# Patient Record
Sex: FEMALE | Race: WHITE | NOT HISPANIC OR LATINO | Employment: UNEMPLOYED | URBAN - METROPOLITAN AREA
[De-identification: names, ages, dates, MRNs, and addresses within clinical notes are randomized per-mention and may not be internally consistent; named-entity substitution may affect disease eponyms.]

---

## 2020-04-29 ENCOUNTER — APPOINTMENT (OUTPATIENT)
Dept: GENERAL RADIOLOGY | Facility: HOSPITAL | Age: 65
End: 2020-04-29

## 2020-04-29 ENCOUNTER — HOSPITAL ENCOUNTER (INPATIENT)
Facility: HOSPITAL | Age: 65
LOS: 13 days | Discharge: NURSING, REHAB OR LONG-TERM CARE | End: 2020-05-12
Attending: EMERGENCY MEDICINE | Admitting: HOSPITALIST

## 2020-04-29 ENCOUNTER — APPOINTMENT (OUTPATIENT)
Dept: NUCLEAR MEDICINE | Facility: HOSPITAL | Age: 65
End: 2020-04-29

## 2020-04-29 DIAGNOSIS — J96.01 ACUTE HYPOXEMIC RESPIRATORY FAILURE (HCC): Primary | ICD-10-CM

## 2020-04-29 DIAGNOSIS — U07.1 COVID-19 VIRUS INFECTION: ICD-10-CM

## 2020-04-29 DIAGNOSIS — M54.30 SCIATIC NERVE PAIN, UNSPECIFIED LATERALITY: ICD-10-CM

## 2020-04-29 PROBLEM — Z95.0 HISTORY OF PACEMAKER: Status: ACTIVE | Noted: 2020-04-29

## 2020-04-29 PROBLEM — R50.9 FEVER: Status: ACTIVE | Noted: 2020-04-29

## 2020-04-29 PROBLEM — Z88.9 MULTIPLE ALLERGIES: Status: ACTIVE | Noted: 2020-04-29

## 2020-04-29 PROBLEM — E66.9 OBESITY (BMI 30-39.9): Status: ACTIVE | Noted: 2020-04-29

## 2020-04-29 PROBLEM — J12.82 PNEUMONIA DUE TO COVID-19 VIRUS: Status: ACTIVE | Noted: 2020-04-29

## 2020-04-29 PROBLEM — Z79.01 CHRONIC ANTICOAGULATION: Status: ACTIVE | Noted: 2020-04-29

## 2020-04-29 PROBLEM — I48.0 PAROXYSMAL ATRIAL FIBRILLATION (HCC): Status: ACTIVE | Noted: 2020-04-29

## 2020-04-29 PROBLEM — J45.909 ASTHMA: Status: ACTIVE | Noted: 2020-04-29

## 2020-04-29 LAB
ALBUMIN SERPL-MCNC: 3.6 G/DL (ref 3.5–5.2)
ALBUMIN/GLOB SERPL: 0.9 G/DL
ALP SERPL-CCNC: 65 U/L (ref 39–117)
ALT SERPL W P-5'-P-CCNC: 7 U/L (ref 1–33)
ANION GAP SERPL CALCULATED.3IONS-SCNC: 14 MMOL/L (ref 5–15)
ARTERIAL PATENCY WRIST A: POSITIVE
AST SERPL-CCNC: 25 U/L (ref 1–32)
ATMOSPHERIC PRESS: ABNORMAL MM[HG]
B PARAPERT DNA SPEC QL NAA+PROBE: NOT DETECTED
B PERT DNA SPEC QL NAA+PROBE: NOT DETECTED
BASE EXCESS BLDA CALC-SCNC: 2.1 MMOL/L (ref 0–2)
BASOPHILS # BLD AUTO: 0 10*3/MM3 (ref 0–0.2)
BASOPHILS NFR BLD AUTO: 0 % (ref 0–1.5)
BDY SITE: ABNORMAL
BILIRUB SERPL-MCNC: 0.4 MG/DL (ref 0.2–1.2)
BODY TEMPERATURE: 37 C
BUN BLD-MCNC: 13 MG/DL (ref 8–23)
BUN/CREAT SERPL: 16.3 (ref 7–25)
C PNEUM DNA NPH QL NAA+NON-PROBE: NOT DETECTED
CALCIUM SPEC-SCNC: 8.8 MG/DL (ref 8.6–10.5)
CHLORIDE SERPL-SCNC: 101 MMOL/L (ref 98–107)
CO2 BLDA-SCNC: 28.1 MMOL/L (ref 22–33)
CO2 SERPL-SCNC: 25 MMOL/L (ref 22–29)
COHGB MFR BLD: 1.3 % (ref 0–2)
CREAT BLD-MCNC: 0.8 MG/DL (ref 0.57–1)
CRP SERPL-MCNC: 13.27 MG/DL (ref 0–0.5)
D DIMER PPP FEU-MCNC: 0.76 MCGFEU/ML (ref 0–0.56)
D-LACTATE SERPL-SCNC: 1.6 MMOL/L (ref 0.5–2)
DEPRECATED RDW RBC AUTO: 43.3 FL (ref 37–54)
EOSINOPHIL # BLD AUTO: 0.01 10*3/MM3 (ref 0–0.4)
EOSINOPHIL NFR BLD AUTO: 0.2 % (ref 0.3–6.2)
ERYTHROCYTE [DISTWIDTH] IN BLOOD BY AUTOMATED COUNT: 12.9 % (ref 12.3–15.4)
FLUAV H1 2009 PAND RNA NPH QL NAA+PROBE: NOT DETECTED
FLUAV H1 HA GENE NPH QL NAA+PROBE: NOT DETECTED
FLUAV H3 RNA NPH QL NAA+PROBE: NOT DETECTED
FLUAV SUBTYP SPEC NAA+PROBE: NOT DETECTED
FLUBV RNA ISLT QL NAA+PROBE: NOT DETECTED
GFR SERPL CREATININE-BSD FRML MDRD: 72 ML/MIN/1.73
GLOBULIN UR ELPH-MCNC: 3.9 GM/DL
GLUCOSE BLD-MCNC: 123 MG/DL (ref 65–99)
HADV DNA SPEC NAA+PROBE: NOT DETECTED
HCO3 BLDA-SCNC: 26.9 MMOL/L (ref 20–26)
HCOV 229E RNA SPEC QL NAA+PROBE: NOT DETECTED
HCOV HKU1 RNA SPEC QL NAA+PROBE: NOT DETECTED
HCOV NL63 RNA SPEC QL NAA+PROBE: NOT DETECTED
HCOV OC43 RNA SPEC QL NAA+PROBE: NOT DETECTED
HCT VFR BLD AUTO: 47.7 % (ref 34–46.6)
HCT VFR BLD CALC: 44.1 %
HGB BLD-MCNC: 14.9 G/DL (ref 12–15.9)
HGB BLDA-MCNC: 14.4 G/DL (ref 14–18)
HMPV RNA NPH QL NAA+NON-PROBE: NOT DETECTED
HOLD SPECIMEN: NORMAL
HOLD SPECIMEN: NORMAL
HOROWITZ INDEX BLD+IHG-RTO: 28 %
HPIV1 RNA SPEC QL NAA+PROBE: NOT DETECTED
HPIV2 RNA SPEC QL NAA+PROBE: NOT DETECTED
HPIV3 RNA NPH QL NAA+PROBE: NOT DETECTED
HPIV4 P GENE NPH QL NAA+PROBE: NOT DETECTED
IMM GRANULOCYTES # BLD AUTO: 0.01 10*3/MM3 (ref 0–0.05)
IMM GRANULOCYTES NFR BLD AUTO: 0.2 % (ref 0–0.5)
LDH SERPL-CCNC: 504 U/L (ref 135–214)
LYMPHOCYTES # BLD AUTO: 1 10*3/MM3 (ref 0.7–3.1)
LYMPHOCYTES NFR BLD AUTO: 20.1 % (ref 19.6–45.3)
M PNEUMO IGG SER IA-ACNC: NOT DETECTED
MAGNESIUM SERPL-MCNC: 1.9 MG/DL (ref 1.6–2.4)
MCH RBC QN AUTO: 28.4 PG (ref 26.6–33)
MCHC RBC AUTO-ENTMCNC: 31.2 G/DL (ref 31.5–35.7)
MCV RBC AUTO: 90.9 FL (ref 79–97)
METHGB BLD QL: 0.9 % (ref 0–1.5)
MODALITY: ABNORMAL
MONOCYTES # BLD AUTO: 0.25 10*3/MM3 (ref 0.1–0.9)
MONOCYTES NFR BLD AUTO: 5 % (ref 5–12)
NEUTROPHILS # BLD AUTO: 3.7 10*3/MM3 (ref 1.7–7)
NEUTROPHILS NFR BLD AUTO: 74.5 % (ref 42.7–76)
NOTE: ABNORMAL
NRBC BLD AUTO-RTO: 0 /100 WBC (ref 0–0.2)
NT-PROBNP SERPL-MCNC: 170.2 PG/ML (ref 5–900)
OXYHGB MFR BLDV: 92.2 % (ref 94–99)
PCO2 BLDA: 41.5 MM HG (ref 35–45)
PCO2 TEMP ADJ BLD: 41.5 MM HG (ref 35–45)
PH BLDA: 7.42 PH UNITS (ref 7.35–7.45)
PH, TEMP CORRECTED: 7.42 PH UNITS
PLATELET # BLD AUTO: 200 10*3/MM3 (ref 140–450)
PMV BLD AUTO: 10.8 FL (ref 6–12)
PO2 BLDA: 68.3 MM HG (ref 83–108)
PO2 TEMP ADJ BLD: 68.3 MM HG (ref 83–108)
POTASSIUM BLD-SCNC: 3.8 MMOL/L (ref 3.5–5.2)
PROCALCITONIN SERPL-MCNC: 0.09 NG/ML (ref 0.1–0.25)
PROT SERPL-MCNC: 7.5 G/DL (ref 6–8.5)
RBC # BLD AUTO: 5.25 10*6/MM3 (ref 3.77–5.28)
RHINOVIRUS RNA SPEC NAA+PROBE: NOT DETECTED
RSV RNA NPH QL NAA+NON-PROBE: NOT DETECTED
SODIUM BLD-SCNC: 140 MMOL/L (ref 136–145)
TROPONIN T SERPL-MCNC: <0.01 NG/ML (ref 0–0.03)
VENTILATOR MODE: ABNORMAL
WBC NRBC COR # BLD: 4.97 10*3/MM3 (ref 3.4–10.8)
WHOLE BLOOD HOLD SPECIMEN: NORMAL
WHOLE BLOOD HOLD SPECIMEN: NORMAL

## 2020-04-29 PROCEDURE — 93005 ELECTROCARDIOGRAM TRACING: CPT | Performed by: EMERGENCY MEDICINE

## 2020-04-29 PROCEDURE — 25010000002 PROCHLORPERAZINE 10 MG/2ML SOLUTION: Performed by: INTERNAL MEDICINE

## 2020-04-29 PROCEDURE — 83615 LACTATE (LD) (LDH) ENZYME: CPT | Performed by: EMERGENCY MEDICINE

## 2020-04-29 PROCEDURE — 86140 C-REACTIVE PROTEIN: CPT | Performed by: EMERGENCY MEDICINE

## 2020-04-29 PROCEDURE — 84484 ASSAY OF TROPONIN QUANT: CPT | Performed by: EMERGENCY MEDICINE

## 2020-04-29 PROCEDURE — 63710000001 DIPHENHYDRAMINE PER 50 MG: Performed by: INTERNAL MEDICINE

## 2020-04-29 PROCEDURE — 82728 ASSAY OF FERRITIN: CPT | Performed by: INTERNAL MEDICINE

## 2020-04-29 PROCEDURE — 87641 MR-STAPH DNA AMP PROBE: CPT | Performed by: INTERNAL MEDICINE

## 2020-04-29 PROCEDURE — 87635 SARS-COV-2 COVID-19 AMP PRB: CPT | Performed by: INTERNAL MEDICINE

## 2020-04-29 PROCEDURE — 87040 BLOOD CULTURE FOR BACTERIA: CPT | Performed by: EMERGENCY MEDICINE

## 2020-04-29 PROCEDURE — 80053 COMPREHEN METABOLIC PANEL: CPT | Performed by: EMERGENCY MEDICINE

## 2020-04-29 PROCEDURE — 83880 ASSAY OF NATRIURETIC PEPTIDE: CPT | Performed by: EMERGENCY MEDICINE

## 2020-04-29 PROCEDURE — 0100U HC BIOFIRE FILMARRAY RESP PANEL 2: CPT | Performed by: EMERGENCY MEDICINE

## 2020-04-29 PROCEDURE — 85379 FIBRIN DEGRADATION QUANT: CPT | Performed by: EMERGENCY MEDICINE

## 2020-04-29 PROCEDURE — 83605 ASSAY OF LACTIC ACID: CPT | Performed by: EMERGENCY MEDICINE

## 2020-04-29 PROCEDURE — 36600 WITHDRAWAL OF ARTERIAL BLOOD: CPT

## 2020-04-29 PROCEDURE — 83735 ASSAY OF MAGNESIUM: CPT | Performed by: INTERNAL MEDICINE

## 2020-04-29 PROCEDURE — 25010000002 ENOXAPARIN PER 10 MG: Performed by: INTERNAL MEDICINE

## 2020-04-29 PROCEDURE — A9540 TC99M MAA: HCPCS | Performed by: EMERGENCY MEDICINE

## 2020-04-29 PROCEDURE — 99284 EMERGENCY DEPT VISIT MOD MDM: CPT

## 2020-04-29 PROCEDURE — 99291 CRITICAL CARE FIRST HOUR: CPT | Performed by: INTERNAL MEDICINE

## 2020-04-29 PROCEDURE — 25010000002 VANCOMYCIN 10 G RECONSTITUTED SOLUTION

## 2020-04-29 PROCEDURE — 84145 PROCALCITONIN (PCT): CPT | Performed by: EMERGENCY MEDICINE

## 2020-04-29 PROCEDURE — 86900 BLOOD TYPING SEROLOGIC ABO: CPT

## 2020-04-29 PROCEDURE — 78580 LUNG PERFUSION IMAGING: CPT

## 2020-04-29 PROCEDURE — 86901 BLOOD TYPING SEROLOGIC RH(D): CPT

## 2020-04-29 PROCEDURE — 71045 X-RAY EXAM CHEST 1 VIEW: CPT

## 2020-04-29 PROCEDURE — 0 TECHNETIUM ALBUMIN AGGREGATED: Performed by: EMERGENCY MEDICINE

## 2020-04-29 PROCEDURE — 85025 COMPLETE CBC W/AUTO DIFF WBC: CPT | Performed by: EMERGENCY MEDICINE

## 2020-04-29 PROCEDURE — 82805 BLOOD GASES W/O2 SATURATION: CPT

## 2020-04-29 PROCEDURE — 94799 UNLISTED PULMONARY SVC/PX: CPT

## 2020-04-29 RX ORDER — PROCHLORPERAZINE MALEATE 5 MG/1
5 TABLET ORAL EVERY 6 HOURS PRN
Status: DISCONTINUED | OUTPATIENT
Start: 2020-04-29 | End: 2020-04-30

## 2020-04-29 RX ORDER — ALBUTEROL SULFATE 90 UG/1
2 AEROSOL, METERED RESPIRATORY (INHALATION) EVERY 4 HOURS PRN
Status: DISCONTINUED | OUTPATIENT
Start: 2020-04-29 | End: 2020-05-12 | Stop reason: HOSPADM

## 2020-04-29 RX ORDER — AZITHROMYCIN 250 MG/1
250 TABLET, FILM COATED ORAL
Status: DISCONTINUED | OUTPATIENT
Start: 2020-04-30 | End: 2020-04-30

## 2020-04-29 RX ORDER — PROCHLORPERAZINE EDISYLATE 5 MG/ML
5 INJECTION INTRAMUSCULAR; INTRAVENOUS EVERY 6 HOURS PRN
Status: DISCONTINUED | OUTPATIENT
Start: 2020-04-29 | End: 2020-04-30

## 2020-04-29 RX ORDER — DILTIAZEM HYDROCHLORIDE 240 MG/1
240 CAPSULE, COATED, EXTENDED RELEASE ORAL DAILY
Status: DISCONTINUED | OUTPATIENT
Start: 2020-04-30 | End: 2020-05-12 | Stop reason: HOSPADM

## 2020-04-29 RX ORDER — MAGNESIUM SULFATE 1 G/100ML
1 INJECTION INTRAVENOUS AS NEEDED
Status: DISCONTINUED | OUTPATIENT
Start: 2020-04-29 | End: 2020-05-12 | Stop reason: HOSPADM

## 2020-04-29 RX ORDER — ATENOLOL 25 MG/1
25 TABLET ORAL DAILY
Status: DISCONTINUED | OUTPATIENT
Start: 2020-04-30 | End: 2020-05-12 | Stop reason: HOSPADM

## 2020-04-29 RX ORDER — ATENOLOL 25 MG/1
25 TABLET ORAL DAILY
COMMUNITY

## 2020-04-29 RX ORDER — DIPHENHYDRAMINE HCL 50 MG
50 CAPSULE ORAL 2 TIMES DAILY PRN
Status: DISCONTINUED | OUTPATIENT
Start: 2020-04-29 | End: 2020-05-12 | Stop reason: HOSPADM

## 2020-04-29 RX ORDER — SODIUM CHLORIDE 0.9 % (FLUSH) 0.9 %
10 SYRINGE (ML) INJECTION AS NEEDED
Status: DISCONTINUED | OUTPATIENT
Start: 2020-04-29 | End: 2020-05-12 | Stop reason: HOSPADM

## 2020-04-29 RX ORDER — ACETAMINOPHEN 325 MG/1
650 TABLET ORAL EVERY 6 HOURS PRN
Status: DISCONTINUED | OUTPATIENT
Start: 2020-04-29 | End: 2020-05-12 | Stop reason: HOSPADM

## 2020-04-29 RX ORDER — PROCHLORPERAZINE 25 MG
25 SUPPOSITORY, RECTAL RECTAL EVERY 12 HOURS PRN
Status: DISCONTINUED | OUTPATIENT
Start: 2020-04-29 | End: 2020-04-30

## 2020-04-29 RX ORDER — DILTIAZEM HYDROCHLORIDE 240 MG/1
240 CAPSULE, COATED, EXTENDED RELEASE ORAL DAILY
COMMUNITY

## 2020-04-29 RX ORDER — MAGNESIUM SULFATE HEPTAHYDRATE 40 MG/ML
2 INJECTION, SOLUTION INTRAVENOUS AS NEEDED
Status: DISCONTINUED | OUTPATIENT
Start: 2020-04-29 | End: 2020-05-12 | Stop reason: HOSPADM

## 2020-04-29 RX ORDER — SPIRONOLACTONE 25 MG/1
25 TABLET ORAL DAILY PRN
COMMUNITY

## 2020-04-29 RX ORDER — MAGNESIUM SULFATE HEPTAHYDRATE 40 MG/ML
4 INJECTION, SOLUTION INTRAVENOUS AS NEEDED
Status: DISCONTINUED | OUTPATIENT
Start: 2020-04-29 | End: 2020-05-12 | Stop reason: HOSPADM

## 2020-04-29 RX ORDER — SPIRONOLACTONE 25 MG/1
50 TABLET ORAL DAILY
Status: DISCONTINUED | OUTPATIENT
Start: 2020-04-30 | End: 2020-04-30

## 2020-04-29 RX ORDER — SODIUM CHLORIDE 0.9 % (FLUSH) 0.9 %
10 SYRINGE (ML) INJECTION EVERY 12 HOURS SCHEDULED
Status: DISCONTINUED | OUTPATIENT
Start: 2020-04-29 | End: 2020-05-12 | Stop reason: HOSPADM

## 2020-04-29 RX ADMIN — Medication 1 DOSE: at 17:50

## 2020-04-29 RX ADMIN — VANCOMYCIN HYDROCHLORIDE 2250 MG: 10 INJECTION, POWDER, LYOPHILIZED, FOR SOLUTION INTRAVENOUS at 21:52

## 2020-04-29 RX ADMIN — PROCHLORPERAZINE EDISYLATE 5 MG: 5 INJECTION INTRAMUSCULAR; INTRAVENOUS at 22:51

## 2020-04-29 RX ADMIN — ENOXAPARIN SODIUM 120 MG: 120 INJECTION SUBCUTANEOUS at 21:52

## 2020-04-29 RX ADMIN — SODIUM CHLORIDE, PRESERVATIVE FREE 10 ML: 5 INJECTION INTRAVENOUS at 21:53

## 2020-04-29 RX ADMIN — DIPHENHYDRAMINE HYDROCHLORIDE 50 MG: 50 CAPSULE ORAL at 21:01

## 2020-04-29 RX ADMIN — ALBUTEROL SULFATE 2 PUFF: 108 AEROSOL, METERED RESPIRATORY (INHALATION) at 23:20

## 2020-04-29 RX ADMIN — ACETAMINOPHEN 650 MG: 325 TABLET, FILM COATED ORAL at 21:52

## 2020-04-30 ENCOUNTER — APPOINTMENT (OUTPATIENT)
Dept: CT IMAGING | Facility: HOSPITAL | Age: 65
End: 2020-04-30

## 2020-04-30 LAB
ABO GROUP BLD: NORMAL
ABO GROUP BLD: NORMAL
ANION GAP SERPL CALCULATED.3IONS-SCNC: 13 MMOL/L (ref 5–15)
BASOPHILS # BLD AUTO: 0.01 10*3/MM3 (ref 0–0.2)
BASOPHILS NFR BLD AUTO: 0.2 % (ref 0–1.5)
BLD GP AB SCN SERPL QL: NEGATIVE
BUN BLD-MCNC: 14 MG/DL (ref 8–23)
BUN/CREAT SERPL: 19.2 (ref 7–25)
CALCIUM SPEC-SCNC: 8.2 MG/DL (ref 8.6–10.5)
CHLORIDE SERPL-SCNC: 100 MMOL/L (ref 98–107)
CO2 SERPL-SCNC: 22 MMOL/L (ref 22–29)
CREAT BLD-MCNC: 0.73 MG/DL (ref 0.57–1)
CRP SERPL-MCNC: 13.79 MG/DL (ref 0–0.5)
D DIMER PPP FEU-MCNC: 0.95 MCGFEU/ML (ref 0–0.56)
DEPRECATED RDW RBC AUTO: 43.3 FL (ref 37–54)
EOSINOPHIL # BLD AUTO: 0.01 10*3/MM3 (ref 0–0.4)
EOSINOPHIL NFR BLD AUTO: 0.2 % (ref 0.3–6.2)
ERYTHROCYTE [DISTWIDTH] IN BLOOD BY AUTOMATED COUNT: 12.9 % (ref 12.3–15.4)
FERRITIN SERPL-MCNC: 624.4 NG/ML (ref 13–150)
FERRITIN SERPL-MCNC: 666.5 NG/ML (ref 13–150)
FIBRINOGEN PPP-MCNC: 647 MG/DL (ref 215–430)
GFR SERPL CREATININE-BSD FRML MDRD: 80 ML/MIN/1.73
GLUCOSE BLD-MCNC: 144 MG/DL (ref 65–99)
HAV IGM SERPL QL IA: NORMAL
HBV CORE IGM SERPL QL IA: NORMAL
HBV SURFACE AG SERPL QL IA: NORMAL
HCT VFR BLD AUTO: 43.1 % (ref 34–46.6)
HCV AB SER DONR QL: NORMAL
HGB BLD-MCNC: 13.7 G/DL (ref 12–15.9)
HIV1+2 AB SER QL: NORMAL
HOLD SPECIMEN: NORMAL
IMM GRANULOCYTES # BLD AUTO: 0.01 10*3/MM3 (ref 0–0.05)
IMM GRANULOCYTES NFR BLD AUTO: 0.2 % (ref 0–0.5)
L PNEUMO1 AG UR QL IA: NEGATIVE
LDH SERPL-CCNC: 512 U/L (ref 135–214)
LYMPHOCYTES # BLD AUTO: 1.2 10*3/MM3 (ref 0.7–3.1)
LYMPHOCYTES NFR BLD AUTO: 25.5 % (ref 19.6–45.3)
MAGNESIUM SERPL-MCNC: 1.8 MG/DL (ref 1.6–2.4)
MCH RBC QN AUTO: 29.1 PG (ref 26.6–33)
MCHC RBC AUTO-ENTMCNC: 31.8 G/DL (ref 31.5–35.7)
MCV RBC AUTO: 91.7 FL (ref 79–97)
MONOCYTES # BLD AUTO: 0.22 10*3/MM3 (ref 0.1–0.9)
MONOCYTES NFR BLD AUTO: 4.7 % (ref 5–12)
MRSA DNA SPEC QL NAA+PROBE: NEGATIVE
NEUTROPHILS # BLD AUTO: 3.26 10*3/MM3 (ref 1.7–7)
NEUTROPHILS NFR BLD AUTO: 69.2 % (ref 42.7–76)
NRBC BLD AUTO-RTO: 0 /100 WBC (ref 0–0.2)
PLATELET # BLD AUTO: 196 10*3/MM3 (ref 140–450)
PMV BLD AUTO: 11.3 FL (ref 6–12)
POTASSIUM BLD-SCNC: 3.4 MMOL/L (ref 3.5–5.2)
POTASSIUM BLD-SCNC: 3.7 MMOL/L (ref 3.5–5.2)
RBC # BLD AUTO: 4.7 10*6/MM3 (ref 3.77–5.28)
RH BLD: POSITIVE
RH BLD: POSITIVE
S PNEUM AG SPEC QL LA: NEGATIVE
SARS-COV-2 RNA RESP QL NAA+PROBE: DETECTED
SODIUM BLD-SCNC: 135 MMOL/L (ref 136–145)
T&S EXPIRATION DATE: NORMAL
WBC NRBC COR # BLD: 4.71 10*3/MM3 (ref 3.4–10.8)

## 2020-04-30 PROCEDURE — 25010000002 TOCILIZUMAB 400 MG/20ML SOLUTION 20 ML VIAL: Performed by: INTERNAL MEDICINE

## 2020-04-30 PROCEDURE — 36430 TRANSFUSION BLD/BLD COMPNT: CPT

## 2020-04-30 PROCEDURE — 86140 C-REACTIVE PROTEIN: CPT | Performed by: INTERNAL MEDICINE

## 2020-04-30 PROCEDURE — 80048 BASIC METABOLIC PNL TOTAL CA: CPT | Performed by: INTERNAL MEDICINE

## 2020-04-30 PROCEDURE — 86900 BLOOD TYPING SEROLOGIC ABO: CPT | Performed by: INTERNAL MEDICINE

## 2020-04-30 PROCEDURE — G0432 EIA HIV-1/HIV-2 SCREEN: HCPCS | Performed by: INTERNAL MEDICINE

## 2020-04-30 PROCEDURE — 63710000001 DIPHENHYDRAMINE PER 50 MG: Performed by: INTERNAL MEDICINE

## 2020-04-30 PROCEDURE — 94640 AIRWAY INHALATION TREATMENT: CPT

## 2020-04-30 PROCEDURE — 99291 CRITICAL CARE FIRST HOUR: CPT | Performed by: INTERNAL MEDICINE

## 2020-04-30 PROCEDURE — 85379 FIBRIN DEGRADATION QUANT: CPT | Performed by: INTERNAL MEDICINE

## 2020-04-30 PROCEDURE — 99292 CRITICAL CARE ADDL 30 MIN: CPT | Performed by: INTERNAL MEDICINE

## 2020-04-30 PROCEDURE — 83735 ASSAY OF MAGNESIUM: CPT | Performed by: INTERNAL MEDICINE

## 2020-04-30 PROCEDURE — 25010000002 VANCOMYCIN 10 G RECONSTITUTED SOLUTION: Performed by: INTERNAL MEDICINE

## 2020-04-30 PROCEDURE — 82728 ASSAY OF FERRITIN: CPT | Performed by: INTERNAL MEDICINE

## 2020-04-30 PROCEDURE — 71250 CT THORAX DX C-: CPT

## 2020-04-30 PROCEDURE — 80074 ACUTE HEPATITIS PANEL: CPT | Performed by: INTERNAL MEDICINE

## 2020-04-30 PROCEDURE — 85384 FIBRINOGEN ACTIVITY: CPT | Performed by: INTERNAL MEDICINE

## 2020-04-30 PROCEDURE — P9017 PLASMA 1 DONOR FRZ W/IN 8 HR: HCPCS

## 2020-04-30 PROCEDURE — 84132 ASSAY OF SERUM POTASSIUM: CPT | Performed by: INTERNAL MEDICINE

## 2020-04-30 PROCEDURE — 83520 IMMUNOASSAY QUANT NOS NONAB: CPT | Performed by: INTERNAL MEDICINE

## 2020-04-30 PROCEDURE — 94799 UNLISTED PULMONARY SVC/PX: CPT

## 2020-04-30 PROCEDURE — 86850 RBC ANTIBODY SCREEN: CPT | Performed by: INTERNAL MEDICINE

## 2020-04-30 PROCEDURE — 25010000002 FUROSEMIDE PER 20 MG: Performed by: INTERNAL MEDICINE

## 2020-04-30 PROCEDURE — 87899 AGENT NOS ASSAY W/OPTIC: CPT | Performed by: INTERNAL MEDICINE

## 2020-04-30 PROCEDURE — 83615 LACTATE (LD) (LDH) ENZYME: CPT | Performed by: INTERNAL MEDICINE

## 2020-04-30 PROCEDURE — 86901 BLOOD TYPING SEROLOGIC RH(D): CPT | Performed by: INTERNAL MEDICINE

## 2020-04-30 PROCEDURE — 25010000002 MAGNESIUM SULFATE 2 GM/50ML SOLUTION: Performed by: INTERNAL MEDICINE

## 2020-04-30 PROCEDURE — 25010000002 ENOXAPARIN PER 10 MG: Performed by: INTERNAL MEDICINE

## 2020-04-30 PROCEDURE — 86927 PLASMA FRESH FROZEN: CPT

## 2020-04-30 PROCEDURE — 85025 COMPLETE CBC W/AUTO DIFF WBC: CPT | Performed by: INTERNAL MEDICINE

## 2020-04-30 RX ORDER — POTASSIUM CHLORIDE 750 MG/1
40 CAPSULE, EXTENDED RELEASE ORAL AS NEEDED
Status: DISCONTINUED | OUTPATIENT
Start: 2020-04-30 | End: 2020-05-12 | Stop reason: HOSPADM

## 2020-04-30 RX ORDER — BUMETANIDE 0.25 MG/ML
2 INJECTION INTRAMUSCULAR; INTRAVENOUS ONCE
Status: COMPLETED | OUTPATIENT
Start: 2020-04-30 | End: 2020-04-30

## 2020-04-30 RX ORDER — BUMETANIDE 0.25 MG/ML
2.5 INJECTION INTRAMUSCULAR; INTRAVENOUS ONCE
Status: COMPLETED | OUTPATIENT
Start: 2020-04-30 | End: 2020-04-30

## 2020-04-30 RX ORDER — FUROSEMIDE 10 MG/ML
60 INJECTION INTRAMUSCULAR; INTRAVENOUS EVERY 12 HOURS
Status: COMPLETED | OUTPATIENT
Start: 2020-04-30 | End: 2020-05-02

## 2020-04-30 RX ORDER — PRAVASTATIN SODIUM 40 MG
40 TABLET ORAL NIGHTLY
Status: DISCONTINUED | OUTPATIENT
Start: 2020-04-30 | End: 2020-05-12 | Stop reason: HOSPADM

## 2020-04-30 RX ADMIN — ACETAMINOPHEN 650 MG: 325 TABLET, FILM COATED ORAL at 14:49

## 2020-04-30 RX ADMIN — SODIUM CHLORIDE, PRESERVATIVE FREE 10 ML: 5 INJECTION INTRAVENOUS at 21:49

## 2020-04-30 RX ADMIN — ENOXAPARIN SODIUM 120 MG: 120 INJECTION SUBCUTANEOUS at 09:11

## 2020-04-30 RX ADMIN — DILTIAZEM HYDROCHLORIDE 240 MG: 240 CAPSULE, COATED, EXTENDED RELEASE ORAL at 09:11

## 2020-04-30 RX ADMIN — VANCOMYCIN HYDROCHLORIDE 1250 MG: 10 INJECTION, POWDER, LYOPHILIZED, FOR SOLUTION INTRAVENOUS at 09:12

## 2020-04-30 RX ADMIN — ALBUTEROL SULFATE 2 PUFF: 108 AEROSOL, METERED RESPIRATORY (INHALATION) at 16:12

## 2020-04-30 RX ADMIN — DIPHENHYDRAMINE HYDROCHLORIDE 50 MG: 50 CAPSULE ORAL at 21:00

## 2020-04-30 RX ADMIN — ACETAMINOPHEN 650 MG: 325 TABLET, FILM COATED ORAL at 03:12

## 2020-04-30 RX ADMIN — ALBUTEROL SULFATE 157.65 PUFF: 108 AEROSOL, METERED RESPIRATORY (INHALATION) at 12:55

## 2020-04-30 RX ADMIN — SPIRONOLACTONE 50 MG: 25 TABLET ORAL at 09:11

## 2020-04-30 RX ADMIN — AZTREONAM 1 G: 1 INJECTION, POWDER, LYOPHILIZED, FOR SOLUTION INTRAMUSCULAR; INTRAVENOUS at 01:30

## 2020-04-30 RX ADMIN — POTASSIUM CHLORIDE 40 MEQ: 10 CAPSULE, COATED, EXTENDED RELEASE ORAL at 06:01

## 2020-04-30 RX ADMIN — BUMETANIDE 2.5 MG: 0.25 INJECTION, SOLUTION INTRAMUSCULAR; INTRAVENOUS at 21:48

## 2020-04-30 RX ADMIN — BUMETANIDE 2 MG: 0.25 INJECTION, SOLUTION INTRAMUSCULAR; INTRAVENOUS at 03:16

## 2020-04-30 RX ADMIN — AZITHROMYCIN 250 MG: 250 TABLET, FILM COATED ORAL at 09:11

## 2020-04-30 RX ADMIN — SODIUM CHLORIDE, PRESERVATIVE FREE 10 ML: 5 INJECTION INTRAVENOUS at 09:13

## 2020-04-30 RX ADMIN — FUROSEMIDE 60 MG: 10 INJECTION, SOLUTION INTRAMUSCULAR; INTRAVENOUS at 12:21

## 2020-04-30 RX ADMIN — AZTREONAM 1 G: 1 INJECTION, POWDER, LYOPHILIZED, FOR SOLUTION INTRAMUSCULAR; INTRAVENOUS at 09:28

## 2020-04-30 RX ADMIN — POTASSIUM CHLORIDE 40 MEQ: 10 CAPSULE, COATED, EXTENDED RELEASE ORAL at 12:21

## 2020-04-30 RX ADMIN — TOCILIZUMAB 800 MG: 20 INJECTION, SOLUTION, CONCENTRATE INTRAVENOUS at 02:49

## 2020-04-30 RX ADMIN — PRAVASTATIN SODIUM 40 MG: 40 TABLET ORAL at 21:49

## 2020-04-30 RX ADMIN — ENOXAPARIN SODIUM 120 MG: 120 INJECTION SUBCUTANEOUS at 20:04

## 2020-04-30 RX ADMIN — ATENOLOL 25 MG: 25 TABLET ORAL at 09:11

## 2020-04-30 RX ADMIN — MAGNESIUM SULFATE 2 G: 2 INJECTION INTRAVENOUS at 06:01

## 2020-05-01 ENCOUNTER — APPOINTMENT (OUTPATIENT)
Dept: GENERAL RADIOLOGY | Facility: HOSPITAL | Age: 65
End: 2020-05-01

## 2020-05-01 LAB
ALBUMIN SERPL-MCNC: 3.4 G/DL (ref 3.5–5.2)
ALBUMIN/GLOB SERPL: 1.1 G/DL
ALP SERPL-CCNC: 62 U/L (ref 39–117)
ALT SERPL W P-5'-P-CCNC: 11 U/L (ref 1–33)
ANION GAP SERPL CALCULATED.3IONS-SCNC: 15 MMOL/L (ref 5–15)
AST SERPL-CCNC: 34 U/L (ref 1–32)
BH BB BLOOD EXPIRATION DATE: NORMAL
BH BB BLOOD EXPIRATION DATE: NORMAL
BH BB BLOOD TYPE BARCODE: 6200
BH BB BLOOD TYPE BARCODE: 6200
BH BB DISPENSE STATUS: NORMAL
BH BB DISPENSE STATUS: NORMAL
BH BB PRODUCT CODE: NORMAL
BH BB PRODUCT CODE: NORMAL
BH BB UNIT NUMBER: NORMAL
BH BB UNIT NUMBER: NORMAL
BILIRUB SERPL-MCNC: 0.3 MG/DL (ref 0.2–1.2)
BUN BLD-MCNC: 16 MG/DL (ref 8–23)
BUN/CREAT SERPL: 19.5 (ref 7–25)
CA-I SERPL ISE-MCNC: 1.12 MMOL/L (ref 1.12–1.32)
CALCIUM SPEC-SCNC: 8.3 MG/DL (ref 8.6–10.5)
CHLORIDE SERPL-SCNC: 97 MMOL/L (ref 98–107)
CO2 SERPL-SCNC: 27 MMOL/L (ref 22–29)
CREAT BLD-MCNC: 0.82 MG/DL (ref 0.57–1)
DEPRECATED RDW RBC AUTO: 43.7 FL (ref 37–54)
ERYTHROCYTE [DISTWIDTH] IN BLOOD BY AUTOMATED COUNT: 13 % (ref 12.3–15.4)
GFR SERPL CREATININE-BSD FRML MDRD: 70 ML/MIN/1.73
GLOBULIN UR ELPH-MCNC: 3.1 GM/DL
GLUCOSE BLD-MCNC: 108 MG/DL (ref 65–99)
HCT VFR BLD AUTO: 44.9 % (ref 34–46.6)
HGB BLD-MCNC: 13.9 G/DL (ref 12–15.9)
IL6 SERPL-MCNC: 132.6 PG/ML (ref 0–15.5)
MAGNESIUM SERPL-MCNC: 1.9 MG/DL (ref 1.6–2.4)
MCH RBC QN AUTO: 28.2 PG (ref 26.6–33)
MCHC RBC AUTO-ENTMCNC: 31 G/DL (ref 31.5–35.7)
MCV RBC AUTO: 91.1 FL (ref 79–97)
PHOSPHATE SERPL-MCNC: 2.7 MG/DL (ref 2.5–4.5)
PLATELET # BLD AUTO: 227 10*3/MM3 (ref 140–450)
PMV BLD AUTO: 11.1 FL (ref 6–12)
POTASSIUM BLD-SCNC: 4.2 MMOL/L (ref 3.5–5.2)
PROT SERPL-MCNC: 6.5 G/DL (ref 6–8.5)
RBC # BLD AUTO: 4.93 10*6/MM3 (ref 3.77–5.28)
SODIUM BLD-SCNC: 139 MMOL/L (ref 136–145)
UNIT  ABO: NORMAL
UNIT  ABO: NORMAL
UNIT  RH: NORMAL
UNIT  RH: NORMAL
WBC NRBC COR # BLD: 3.8 10*3/MM3 (ref 3.4–10.8)

## 2020-05-01 PROCEDURE — 84100 ASSAY OF PHOSPHORUS: CPT | Performed by: INTERNAL MEDICINE

## 2020-05-01 PROCEDURE — 82330 ASSAY OF CALCIUM: CPT | Performed by: INTERNAL MEDICINE

## 2020-05-01 PROCEDURE — 25010000002 ENOXAPARIN PER 10 MG: Performed by: INTERNAL MEDICINE

## 2020-05-01 PROCEDURE — 83735 ASSAY OF MAGNESIUM: CPT | Performed by: INTERNAL MEDICINE

## 2020-05-01 PROCEDURE — 71045 X-RAY EXAM CHEST 1 VIEW: CPT

## 2020-05-01 PROCEDURE — 25010000002 FUROSEMIDE PER 20 MG: Performed by: INTERNAL MEDICINE

## 2020-05-01 PROCEDURE — 99233 SBSQ HOSP IP/OBS HIGH 50: CPT | Performed by: INTERNAL MEDICINE

## 2020-05-01 PROCEDURE — 25010000002 DEXAMETHASONE PER 1 MG: Performed by: INTERNAL MEDICINE

## 2020-05-01 PROCEDURE — 63710000001 DIPHENHYDRAMINE PER 50 MG: Performed by: INTERNAL MEDICINE

## 2020-05-01 PROCEDURE — 85027 COMPLETE CBC AUTOMATED: CPT | Performed by: INTERNAL MEDICINE

## 2020-05-01 PROCEDURE — 25010000002 MAGNESIUM SULFATE 2 GM/50ML SOLUTION: Performed by: INTERNAL MEDICINE

## 2020-05-01 PROCEDURE — 80053 COMPREHEN METABOLIC PANEL: CPT | Performed by: INTERNAL MEDICINE

## 2020-05-01 RX ORDER — DEXAMETHASONE IN 0.9 % SOD CHL 10 MG/50ML
10 INTRAVENOUS SOLUTION, PIGGYBACK (ML) INTRAVENOUS ONCE
Status: COMPLETED | OUTPATIENT
Start: 2020-05-01 | End: 2020-05-01

## 2020-05-01 RX ADMIN — SODIUM CHLORIDE, PRESERVATIVE FREE 10 ML: 5 INJECTION INTRAVENOUS at 08:46

## 2020-05-01 RX ADMIN — SODIUM CHLORIDE, PRESERVATIVE FREE 10 ML: 5 INJECTION INTRAVENOUS at 22:22

## 2020-05-01 RX ADMIN — DEXAMETHASONE SODIUM PHOSPHATE 10 MG: 10 INJECTION INTRAMUSCULAR; INTRAVENOUS at 22:22

## 2020-05-01 RX ADMIN — DILTIAZEM HYDROCHLORIDE 240 MG: 240 CAPSULE, COATED, EXTENDED RELEASE ORAL at 08:45

## 2020-05-01 RX ADMIN — SODIUM CHLORIDE, PRESERVATIVE FREE 10 ML: 5 INJECTION INTRAVENOUS at 00:01

## 2020-05-01 RX ADMIN — PRAVASTATIN SODIUM 40 MG: 40 TABLET ORAL at 20:19

## 2020-05-01 RX ADMIN — ENOXAPARIN SODIUM 120 MG: 120 INJECTION SUBCUTANEOUS at 08:45

## 2020-05-01 RX ADMIN — FUROSEMIDE 60 MG: 10 INJECTION, SOLUTION INTRAMUSCULAR; INTRAVENOUS at 00:00

## 2020-05-01 RX ADMIN — MAGNESIUM SULFATE 2 G: 2 INJECTION INTRAVENOUS at 06:23

## 2020-05-01 RX ADMIN — ATENOLOL 25 MG: 25 TABLET ORAL at 08:45

## 2020-05-01 RX ADMIN — ENOXAPARIN SODIUM 120 MG: 120 INJECTION SUBCUTANEOUS at 20:19

## 2020-05-01 RX ADMIN — ACETAMINOPHEN 650 MG: 325 TABLET, FILM COATED ORAL at 22:17

## 2020-05-01 RX ADMIN — ACETAMINOPHEN 650 MG: 325 TABLET, FILM COATED ORAL at 03:33

## 2020-05-01 RX ADMIN — FUROSEMIDE 60 MG: 10 INJECTION, SOLUTION INTRAMUSCULAR; INTRAVENOUS at 12:17

## 2020-05-01 RX ADMIN — DIPHENHYDRAMINE HYDROCHLORIDE 50 MG: 50 CAPSULE ORAL at 22:19

## 2020-05-01 NOTE — PROGRESS NOTES
Clinical Nutrition     Multidisciplinary Rounds      Patient Name: Marlene Aguirre  Date of Encounter: 05/01/20 12:22  MRN: [id number]  Admission date: 4/29/2020      Reason for visit: GRACE. RD to continue to follow per protocol.     Pt sitting up in chair, on nasal cannula, in no distress    Spoke with pt via phone as in isolation precautions    Current diet: Diet Regular; Cardiac    Intervention:  Follow treatment plan  Care plan reviewed  Menu provided, adjusted    Follow up:   Per protocol      Loly Jane RD  12:22 PM  Time: 15min

## 2020-05-01 NOTE — PLAN OF CARE
Pt remained on 6 LNC thru out night, spo2 from 85-92%. Bumex and lasix given IV  per md order urinary output 1900 ml. Replaced magnesium iv. Pt rested well during night.

## 2020-05-01 NOTE — PROGRESS NOTES
INFECTIOUS DISEASE CONSULT/INITIAL HOSPITAL VISIT    Marlene Aguirre  1955  [id number]    Date of Consult: 5/1/2020    Admission Date: 4/29/2020      Requesting Provider: Rishi Gill MD  Evaluating Physician: Braxton Horn MD    Reason for Consultation: COVID-19    History of present illness:    Patient is a 64 y.o. female who has intermittent asthma who has history of tobacco use and quit 3 years ago has multiple family members living with her.  Apparently patient developed headache, diarrhea, dry cough was given a Z-Jules by primary care physician on April 27,020 patient had progressive cough and fever and hypoxia on April 29.    Apparently a therapist that was coming to the house tested positive for COVID prior to patient's symptoms.    Patient admitted to the floor but because of worsening hypoxia and fevers was transferred to the intensive care unit.  Patient given 1 dose of 800 mg of Actemra last night    Patient is stable hemodynamically and on oxygen by nasal cannula currently her sats on 6 L are 91%    CT scan performed high probability for COVID-19 with bilateral groundglass opacities    COVID PCR positive    5/1/2020 patient received 2 units of convalescent serum plasma yesterday is received IV Lasix as well remains on 6 L with O2 sats between 88% and 90%.  Patient is a good spirits is sitting in chair is able to order food for nutrition per telephone call.  Per nursing staff had liquid bowel movements x3 yesterday is otherwise afebrile and hemodynamically stable    Review of systems remark for diarrhea no fevers  Past Medical History:   Diagnosis Date   • Asthma    • Atrial fibrillation (CMS/HCC)    • Coronary artery disease    • Hypertension        Past Surgical History:   Procedure Laterality Date   • BILATERAL BREAST REDUCTION     • BRAIN SURGERY     • DILATATION AND CURETTAGE     • PACEMAKER IMPLANTATION         History reviewed. No pertinent family history.    Social History      Socioeconomic History   • Marital status:      Spouse name: Not on file   • Number of children: Not on file   • Years of education: Not on file   • Highest education level: Not on file   Tobacco Use   • Smoking status: Former Smoker   • Smokeless tobacco: Never Used   • Tobacco comment: QUIT 3 YEARS AGO   Substance and Sexual Activity   • Alcohol use: Never     Frequency: Never   • Drug use: Never   • Sexual activity: Defer       Allergies   Allergen Reactions   • Contrast Dye Swelling     FACE, NOSE AND NECK SWELLING AS WELL AS SANDI    • Doxycycline Provider Review Needed     PT UNSURE OF REACTION    • Flexeril [Cyclobenzaprine] Hives   • Keflex [Cephalexin] Provider Review Needed     PT DOES NOT REMEMBER    • Kenalog [Triamcinolone Acetonide] Itching   • Penicillins Hives   • Sulfa Antibiotics Itching   • Tetracyclines & Related Provider Review Needed     PT UNSURE OF REACTION   • Levaquin [Levofloxacin] Rash         Medication:    Current Facility-Administered Medications:   •  acetaminophen (TYLENOL) tablet 650 mg, 650 mg, Oral, Q6H PRN, Kan Ng MD, 650 mg at 05/01/20 0333  •  albuterol sulfate HFA (PROVENTIL HFA;VENTOLIN HFA;PROAIR HFA) inhaler 2 puff, 2 puff, Inhalation, Q4H PRN, Kan Ng MD, 2 puff at 04/30/20 1612  •  atenolol (TENORMIN) tablet 25 mg, 25 mg, Oral, Daily, Kan Ng MD, 25 mg at 05/01/20 0845  •  dilTIAZem CD (CARDIZEM CD) 24 hr capsule 240 mg, 240 mg, Oral, Daily, Kan Ng MD, 240 mg at 05/01/20 0845  •  diphenhydrAMINE (BENADRYL) capsule 50 mg, 50 mg, Oral, BID PRN, Kan Ng MD, 50 mg at 04/30/20 2100  •  enoxaparin (LOVENOX) syringe 120 mg, 1 mg/kg, Subcutaneous, Q12H, Kan Ng MD, 120 mg at 05/01/20 0845  •  furosemide (LASIX) injection 60 mg, 60 mg, Intravenous, Q12H, Efrem Laguerre MD, 60 mg at 05/01/20 1217  •  magnesium sulfate 4 gram infusion - Mg less than or equal to 1mg/dL, 4 g, Intravenous, PRN **OR** magnesium sulfate 3  gram infusion (1gm x 3) - Mg 1.1 - 1.5 mg/dL, 1 g, Intravenous, PRN **OR** Magnesium Sulfate 2 gram infusion- Mg 1.6 - 1.9 mg/dL, 2 g, Intravenous, PRN, Kan Ng MD, Last Rate: 25 mL/hr at 20 0623, 2 g at 20 0623  •  potassium chloride (MICRO-K) CR capsule 40 mEq, 40 mEq, Oral, PRN, Kan Ng MD, 40 mEq at 20 1221  •  pravastatin (PRAVACHOL) tablet 40 mg, 40 mg, Oral, Nightly, Braxton Horn MD, 40 mg at 20 2149  •  sodium chloride 0.9 % flush 10 mL, 10 mL, Intravenous, PRNHernandez Yuri, MD  •  sodium chloride 0.9 % flush 10 mL, 10 mL, Intravenous, Q12H, Kan Ng MD, 10 mL at 20 0846  •  sodium chloride 0.9 % flush 10 mL, 10 mL, Intravenous, PRN, Kan Ng MD, 10 mL at 20 0001    Antibiotics:  Anti-Infectives (From admission, onward)    Ordered     Dose/Rate Route Frequency Start Stop    20  vancomycin 2250 mg/500 mL 0.9% NS IVPB ([facility])     Ordering Provider:  Sondra Worrell RPH    20 mg/kg × 118 kg  over 150 Minutes Intravenous Once 20 0022    20  aztreonam (AZACTAM) 1 g in 50 mL NS IVPB     Ordering Provider:  Sondra Worrell RPH    1 g  over 30 Minutes Intravenous Once 20 0200            Review of Systems:  See HPI      Physical Exam:   Vital Signs  Temp (24hrs), Av.7 °F (37.1 °C), Min:98.3 °F (36.8 °C), Max:99 °F (37.2 °C)    Temp  Min: 98.3 °F (36.8 °C)  Max: 99 °F (37.2 °C)  BP  Min: 92/52  Max: 127/67  Pulse  Min: 65  Max: 84  Resp  Min: 16  Max: 22  SpO2  Min: 85 %  Max: 94 %  Patient seen through window and list to conversation with nutritionist.  Patient examined by Bluetooth stethoscope with assisting nursing staff was already in the room and PPE  GENERAL: Awake and alert, pleasant in no distress on oxygen by nasal cannula  HEENT: Normocephalic, atraumatic.  EOMI no external oral lesions    HEART: S1-S2 no murmur by Bluetooth stethoscope  LUNGS: Bilateral chest wall  expansion clear breath sounds bilaterally by Bluetooth stethoscope  ABDOMEN: Bowel sounds were present.  No obvious abdominal distention on exam    MSK: Able to hold a phone with her right hand    NEURO: Oriented to PPT.  Able to have an appropriate conversation  PSYCHIATRIC:  Normal affect    Laboratory Data    Results from last 7 days   Lab Units 05/01/20  0343 04/30/20  0309 04/29/20  1511   WBC 10*3/mm3 3.80 4.71 4.97   HEMOGLOBIN g/dL 13.9 13.7 14.9   HEMATOCRIT % 44.9 43.1 47.7*   PLATELETS 10*3/mm3 227 196 200     Results from last 7 days   Lab Units 05/01/20  0343   SODIUM mmol/L 139   POTASSIUM mmol/L 4.2   CHLORIDE mmol/L 97*   CO2 mmol/L 27.0   BUN mg/dL 16   CREATININE mg/dL 0.82   GLUCOSE mg/dL 108*   CALCIUM mg/dL 8.3*     Results from last 7 days   Lab Units 05/01/20  0343   ALK PHOS U/L 62   BILIRUBIN mg/dL 0.3   ALT (SGPT) U/L 11   AST (SGOT) U/L 34*         Results from last 7 days   Lab Units 04/30/20  0309   CRP mg/dL 13.79*     Results from last 7 days   Lab Units 04/29/20  1511   LACTATE mmol/L 1.6             Estimated Creatinine Clearance: 83.4 mL/min (by C-G formula based on SCr of 0.82 mg/dL).      Microbiology:  No results found for: ACANTHNAEG, AFBCX, BPERTUSSISCX, BLOODCX  No results found for: BCIDPCR, CXREFLEX, CSFCX, CULTURETIS  No results found for: CULTURES, HSVCX, URCX  No results found for: EYECULTURE, GCCX, LABHSV  No results found for: LEGIONELLA, MRSACX, MUMPSCX, MYCOPLASCX  No results found for: NOCARDIACX, STOOLCX  No results found for: THROATCX, UNSTIMCULT, URINECX, CULTURE, VZVCULTUR  No results found for: VIRALCULTU, WOUNDCX        Radiology:  Imaging Results (Last 72 Hours)     Procedure Component Value Units Date/Time    XR Chest 1 View [[id number]] Collected:  05/01/20 0842     Updated:  05/01/20 0846    Narrative:       EXAMINATION: XR CHEST 1 VW-     INDICATION: Resp Distress; J96.01-Acute respiratory failure with  hypoxia; U07.1-COVID-19     COMPARISON: 04/29/2020      FINDINGS: Patchy disease in the right upper lung has moderately  increased. There is mild increased interstitial change in the left  perihilar region and right base. No dense lung consolidation effusion or  pneumothorax is seen. The heart is mildly enlarged. Vasculature is  slightly cephalized.          Impression:       Increased patchy bilateral interstitial disease, greater in  the right upper lung than elsewhere.           NM Lung Scan Perfusion Particulate [[id number]] Collected:  04/29/20 1825     Updated:  04/30/20 1642    Narrative:       EXAMINATION: NM LUNG SCAN, PERFUSION PARTICULATE-04/29/2020:     INDICATION: Hypoxemia in COVID patient with IV dye allergy; J96.01-Acute  respiratory failure with hypoxia; U07.1-COVID-19.      TECHNIQUE: Nuclear medicine perfusion scan performed with 5.5 mCi of MAA  administered.     COMPARISON: NONE.     FINDINGS: Heterogeneity of perfusion correlating with acute parenchymal  disease process on chest x-ray performed same day, however, no large  defect to suggest perfusional abnormality.       Impression:       Heterogeneity of chronic findings along with acute airspace  disease as seen on chest x-ray performed same day without perfusional  defect otherwise identified, overall low probability for perfusional  abnormality. If symptoms persistent and/or progress, CT angiogram of the  chest may be considered for further evaluation.     D:  04/29/2020  E:  04/30/2020            This report was finalized on 4/30/2020 4:38 PM by Dr. Homer Zhu.       XR Chest 1 View [[id number]] Collected:  04/29/20 1732     Updated:  04/30/20 1641    Narrative:       EXAMINATION: XR CHEST 1 VW-04/29/2020:      INDICATION: COVID pneumonia.      COMPARISON: NONE.     FINDINGS: Cardiomegaly with increased pulmonary vascularity. Scattered  right mid and lower lung opacifications consistent with acute airspace  disease. No pneumothorax or pleural effusion. Degenerative changes of  the spine. Left  chest wall pacing device with leads intact.           Impression:       Patchy opacifications throughout the right mid and lower  lung consistent with acute airspace disease such as bronchopneumonia. No  pneumothorax or pleural effusion.     D:  04/29/2020  E:  04/30/2020     This report was finalized on 4/30/2020 4:38 PM by Dr. Homer Zhu.       CT Chest Without Contrast [[id number]] Collected:  04/30/20 0242     Updated:  04/30/20 0244    Narrative:       CT CHEST, NONCONTRAST, 4/30/2020    HISTORY:  64-year-old female hospital inpatient with acute respiratory failure with hypoxia. Positive testing for COVID-19 about one week prior.    TECHNIQUE:  CT imaging of the chest without IV contrast. Radiation dose reduction techniques included automated exposure control. Radiation audit for CT and nuclear cardiology exams in the last 12 months: 0.    FINDINGS:  Extensive multifocal regions of patchy groundglass opacity are scattered throughout the both lungs in association with thickening of the interlobular and intralobular septa in these regions. These are commonly reported imaging features of COVID-19  pneumonia.    There is no dense airspace consolidation or pleural effusion. Trachea and central bronchi are normal in caliber and widely patent. No thoracic adenopathy.    Mild cardiomegaly, but no pericardial effusion. Normal caliber thoracic aorta. Cardiac pacemaker. Limited upper abdominal images showing cholelithiasis.      Impression:       1.  Extensive multifocal regions of moderately dense, patchy groundglass pulmonary opacity throughout both lungs.  2.  COVID-19 reporting category: Typical. Commonly reported imaging features of COVID-19 pneumonia are present. Viral testing reportedly confirms the diagnosis.    Signer Name: Hesham Veliz MD   Signed: 4/30/2020 2:42 AM   Workstation Name: PHIL    [facility]            Impression:   CO VID 19 pneumonitis  Hypoxia  Acute  respiratory failure  Probable cytokine storm manifested by elevated ferritin elevated LDH, hypoxia  Penicillin allergy; questionable throat swelling  Doxycycline intolerance    Patient has received Actemra on 4/29  Received 2 units of serum convalescent plasma therapy on 4/30    PLAN/RECOMMENDATIONS:   Thank you for asking us to see Marlene Aguirre, I recommend the following:    Continue supportive care agree with diuresis  Agree with Lovenox for DVT prophylaxis  Continue pravastatin for cardioprotection    I discussed case Dr. Rickie Luna    Watch patient for clinical deterioration in severe cases of cytokine storm patient may require more than 1 dose of Actemra.    IL-6 level is pending    Hopefully oxygenation will continue to improve    Is critically ill    Critical care time 30 minutes time in 1130 time out 12 noon  Braxton Horn MD  5/1/2020  13:07

## 2020-05-01 NOTE — PLAN OF CARE
Problem: Patient Care Overview  Goal: Plan of Care Review  5/1/2020 1739 by Mohit Espana, RN  Outcome: Ongoing (interventions implemented as appropriate)  Flowsheets  Taken 4/30/2020 1639 by Julita James RN  Progress: improving  Taken 5/1/2020 1600 by Mohit Espana, RN  Plan of Care Reviewed With: patient  Note:   Uneventful shift, patient remains on 6L NC. Patient has severe intolerance to movement, saturations will drop to low 80's when ambulating. Pt remained Afebrile, BP and HR stable. Pt has had 2 loose bm's this shift. Patient has had more of an appetite today but states she has felt a little off compared to the day before. Patient has left sided deficits due to an injury as a teenager. Family updated on patients condition, will continue to monitor. Continue airborne and contact precautions due to positive corona virus test.

## 2020-05-01 NOTE — PROGRESS NOTES
Continued Stay Note  [facility]     Patient Name: Marlene Aguirre  MRN: [id number]  Today's Date: 5/1/2020    Admit Date: 4/29/2020    Discharge Plan     Row Name 05/01/20 1111       Plan    Plan  Home    Plan Comments  Patient in isolation, discussed in MDR.  Patient remains on 6LNC.  Received convalescent plasma.  CM will continue to follow.        Discharge Codes    No documentation.       Expected Discharge Date and Time     Expected Discharge Date Expected Discharge Time    May 6, 2020             Rolanda Lujan RN

## 2020-05-01 NOTE — PROGRESS NOTES
"Intensive Care Follow-up     Hospital:  LOS: 2 days   Ms. Marlene Aguirre, 64 y.o. female is followed for:   Pneumonia due to COVID-19 virus        Subjective   Interval History:  The chart has been reviewed.  The patient states that she is still quite short of breath even at rest.  She has been desaturating with activity.  She has remained afebrile.  She states that she was coughing quite a bit more yesterday and this seems to have improved today.    The patient's past medical, surgical and social history were reviewed and updated in Epic as appropriate.        Objective     Infusions:     Medications:    atenolol 25 mg Oral Daily   dilTIAZem  mg Oral Daily   enoxaparin 1 mg/kg Subcutaneous Q12H   furosemide 60 mg Intravenous Q12H   pravastatin 40 mg Oral Nightly   sodium chloride 10 mL Intravenous Q12H       Vital Sign Min/Max for last 24 hours  Temp  Min: 98.3 °F (36.8 °C)  Max: 99 °F (37.2 °C)   BP  Min: 92/52  Max: 127/67   Pulse  Min: 65  Max: 84   Resp  Min: 16  Max: 22   SpO2  Min: 86 %  Max: 94 %   Flow (L/min)  Min: 6  Max: 6       Input/Output for last 24 hour shift  04/30 0701 - 05/01 0700  In: 823 [P.O.:60; I.V.:325]  Out: 2600 [Urine:2600]      Objective:  General Appearance:  Uncomfortable, ill-appearing, in no acute distress and not in pain.    Vital signs: (most recent): Blood pressure 118/64, pulse 72, temperature 98.3 °F (36.8 °C), temperature source Oral, resp. rate 18, height 160 cm (63\"), weight 112 kg (247 lb 12.8 oz), SpO2 (!) 89 %.    HEENT: Normal HEENT exam.    Lungs:  Normal effort and normal respiratory rate.  Breath sounds clear to auscultation.  There are rales.  No decreased breath sounds, wheezes or rhonchi.    Heart: Normal rate.  Regular rhythm.  S1 normal.  No murmur.   Abdomen: Abdomen is soft and non-distended.  Bowel sounds are normal.   There is no abdominal tenderness.     Neurological: Patient is alert and oriented to person, place and time.    Pupils:  Pupils are " equal, round, and reactive to light.  Pupils are equal.   Skin:  Warm.  No rash or cyanosis.             Results from last 7 days   Lab Units 05/01/20  0343 04/30/20  0309 04/29/20  1511   WBC 10*3/mm3 3.80 4.71 4.97   HEMOGLOBIN g/dL 13.9 13.7 14.9   PLATELETS 10*3/mm3 227 196 200     Results from last 7 days   Lab Units 05/01/20  0343 04/30/20  1719 04/30/20  0309 04/29/20  1511   SODIUM mmol/L 139  --  135* 140   POTASSIUM mmol/L 4.2 3.7 3.4* 3.8   CO2 mmol/L 27.0  --  22.0 25.0   BUN mg/dL 16  --  14 13   CREATININE mg/dL 0.82  --  0.73 0.80   MAGNESIUM mg/dL 1.9  --  1.8 1.9   PHOSPHORUS mg/dL 2.7  --   --   --    GLUCOSE mg/dL 108*  --  144* 123*     Estimated Creatinine Clearance: 83.4 mL/min (by C-G formula based on SCr of 0.82 mg/dL).    Results from last 7 days   Lab Units 04/29/20  1638   PH, ARTERIAL pH units 7.420   PCO2, ARTERIAL mm Hg 41.5   PO2 ART mm Hg 68.3*         I reviewed the patient's results and images.     Assessment/Plan   Impression        COVID-19 Pneumonitis    Acute hypoxemic respiratory failure (CMS/HCC)    Fever    Paroxysmal atrial fibrillation (CMS/HCC)    Obesity (BMI 30-39.9)       Plan        Patient is diuresing well today.  Continue to wean oxygen as tolerated.  We will watch for any sign of worsening respiratory status as she may require urgent intubation.  So far she is doing well on 6 L nasal cannula though of course she does desaturate with activity.  She will remain in the intensive care unit under close observation due to her high risk of worsening from respiratory standpoint in the face of viral pneumonia.    Plan of care and goals reviewed with mulitdisciplinary/antibiotic stewardship team during rounds.   I discussed the patient's findings and my recommendations with patient and nursing staff     High level of risk due to:  illness with threat to life or bodily function.      Rickie Luna MD, [facility]  Pulmonology and Critical Care Medicine

## 2020-05-02 LAB
ANION GAP SERPL CALCULATED.3IONS-SCNC: 16 MMOL/L (ref 5–15)
BASOPHILS # BLD AUTO: 0.01 10*3/MM3 (ref 0–0.2)
BASOPHILS NFR BLD AUTO: 0.4 % (ref 0–1.5)
BUN BLD-MCNC: 21 MG/DL (ref 8–23)
BUN/CREAT SERPL: 25.3 (ref 7–25)
CALCIUM SPEC-SCNC: 8.3 MG/DL (ref 8.6–10.5)
CHLORIDE SERPL-SCNC: 96 MMOL/L (ref 98–107)
CO2 SERPL-SCNC: 26 MMOL/L (ref 22–29)
CREAT BLD-MCNC: 0.83 MG/DL (ref 0.57–1)
CRP SERPL-MCNC: 3.99 MG/DL (ref 0–0.5)
D DIMER PPP FEU-MCNC: 0.71 MCGFEU/ML (ref 0–0.56)
DEPRECATED RDW RBC AUTO: 42.9 FL (ref 37–54)
EOSINOPHIL # BLD AUTO: 0.03 10*3/MM3 (ref 0–0.4)
EOSINOPHIL NFR BLD AUTO: 1.2 % (ref 0.3–6.2)
ERYTHROCYTE [DISTWIDTH] IN BLOOD BY AUTOMATED COUNT: 12.5 % (ref 12.3–15.4)
FERRITIN SERPL-MCNC: 889.8 NG/ML (ref 13–150)
GFR SERPL CREATININE-BSD FRML MDRD: 69 ML/MIN/1.73
GLUCOSE BLD-MCNC: 155 MG/DL (ref 65–99)
HCT VFR BLD AUTO: 44.1 % (ref 34–46.6)
HGB BLD-MCNC: 13.5 G/DL (ref 12–15.9)
IMM GRANULOCYTES # BLD AUTO: 0.01 10*3/MM3 (ref 0–0.05)
IMM GRANULOCYTES NFR BLD AUTO: 0.4 % (ref 0–0.5)
LDH SERPL-CCNC: 651 U/L (ref 135–214)
LYMPHOCYTES # BLD AUTO: 0.56 10*3/MM3 (ref 0.7–3.1)
LYMPHOCYTES NFR BLD AUTO: 22 % (ref 19.6–45.3)
MAGNESIUM SERPL-MCNC: 2.2 MG/DL (ref 1.6–2.4)
MCH RBC QN AUTO: 28.4 PG (ref 26.6–33)
MCHC RBC AUTO-ENTMCNC: 30.6 G/DL (ref 31.5–35.7)
MCV RBC AUTO: 92.6 FL (ref 79–97)
MONOCYTES # BLD AUTO: 0.07 10*3/MM3 (ref 0.1–0.9)
MONOCYTES NFR BLD AUTO: 2.8 % (ref 5–12)
NEUTROPHILS # BLD AUTO: 1.86 10*3/MM3 (ref 1.7–7)
NEUTROPHILS NFR BLD AUTO: 73.2 % (ref 42.7–76)
NRBC BLD AUTO-RTO: 0 /100 WBC (ref 0–0.2)
PLAT MORPH BLD: NORMAL
PLATELET # BLD AUTO: 253 10*3/MM3 (ref 140–450)
PMV BLD AUTO: 10.9 FL (ref 6–12)
POTASSIUM BLD-SCNC: 4.5 MMOL/L (ref 3.5–5.2)
RBC # BLD AUTO: 4.76 10*6/MM3 (ref 3.77–5.28)
RBC MORPH BLD: NORMAL
SODIUM BLD-SCNC: 138 MMOL/L (ref 136–145)
WBC MORPH BLD: NORMAL
WBC NRBC COR # BLD: 2.54 10*3/MM3 (ref 3.4–10.8)

## 2020-05-02 PROCEDURE — 85379 FIBRIN DEGRADATION QUANT: CPT | Performed by: INTERNAL MEDICINE

## 2020-05-02 PROCEDURE — 82728 ASSAY OF FERRITIN: CPT | Performed by: INTERNAL MEDICINE

## 2020-05-02 PROCEDURE — 83520 IMMUNOASSAY QUANT NOS NONAB: CPT | Performed by: INTERNAL MEDICINE

## 2020-05-02 PROCEDURE — 83615 LACTATE (LD) (LDH) ENZYME: CPT | Performed by: INTERNAL MEDICINE

## 2020-05-02 PROCEDURE — 63710000001 DIPHENHYDRAMINE PER 50 MG: Performed by: INTERNAL MEDICINE

## 2020-05-02 PROCEDURE — 25010000002 DEXAMETHASONE: Performed by: INTERNAL MEDICINE

## 2020-05-02 PROCEDURE — 85025 COMPLETE CBC W/AUTO DIFF WBC: CPT | Performed by: INTERNAL MEDICINE

## 2020-05-02 PROCEDURE — 25010000002 FUROSEMIDE PER 20 MG: Performed by: INTERNAL MEDICINE

## 2020-05-02 PROCEDURE — 83735 ASSAY OF MAGNESIUM: CPT | Performed by: INTERNAL MEDICINE

## 2020-05-02 PROCEDURE — 86140 C-REACTIVE PROTEIN: CPT | Performed by: INTERNAL MEDICINE

## 2020-05-02 PROCEDURE — 80048 BASIC METABOLIC PNL TOTAL CA: CPT | Performed by: INTERNAL MEDICINE

## 2020-05-02 PROCEDURE — 25010000002 ENOXAPARIN PER 10 MG: Performed by: INTERNAL MEDICINE

## 2020-05-02 PROCEDURE — 85007 BL SMEAR W/DIFF WBC COUNT: CPT | Performed by: INTERNAL MEDICINE

## 2020-05-02 PROCEDURE — 99233 SBSQ HOSP IP/OBS HIGH 50: CPT | Performed by: INTERNAL MEDICINE

## 2020-05-02 RX ADMIN — DILTIAZEM HYDROCHLORIDE 240 MG: 240 CAPSULE, COATED, EXTENDED RELEASE ORAL at 08:32

## 2020-05-02 RX ADMIN — SODIUM CHLORIDE, PRESERVATIVE FREE 10 ML: 5 INJECTION INTRAVENOUS at 23:52

## 2020-05-02 RX ADMIN — ACETAMINOPHEN 650 MG: 325 TABLET, FILM COATED ORAL at 13:09

## 2020-05-02 RX ADMIN — ENOXAPARIN SODIUM 120 MG: 120 INJECTION SUBCUTANEOUS at 20:54

## 2020-05-02 RX ADMIN — ATENOLOL 25 MG: 25 TABLET ORAL at 08:31

## 2020-05-02 RX ADMIN — PRAVASTATIN SODIUM 40 MG: 40 TABLET ORAL at 20:54

## 2020-05-02 RX ADMIN — FUROSEMIDE 60 MG: 10 INJECTION, SOLUTION INTRAMUSCULAR; INTRAVENOUS at 00:15

## 2020-05-02 RX ADMIN — SODIUM CHLORIDE, PRESERVATIVE FREE 10 ML: 5 INJECTION INTRAVENOUS at 08:32

## 2020-05-02 RX ADMIN — ACETAMINOPHEN 650 MG: 325 TABLET, FILM COATED ORAL at 23:51

## 2020-05-02 RX ADMIN — DIPHENHYDRAMINE HYDROCHLORIDE 50 MG: 50 CAPSULE ORAL at 16:07

## 2020-05-02 RX ADMIN — ENOXAPARIN SODIUM 120 MG: 120 INJECTION SUBCUTANEOUS at 08:31

## 2020-05-02 RX ADMIN — DEXAMETHASONE SODIUM PHOSPHATE 10 MG: 10 INJECTION INTRAMUSCULAR; INTRAVENOUS at 16:07

## 2020-05-02 NOTE — PROGRESS NOTES
INFECTIOUS DISEASE CONSULT/INITIAL HOSPITAL VISIT    Marlene Aguirre  1955  [id number]    Date of Consult: 5/2/2020    Admission Date: 4/29/2020      Requesting Provider: Rishi Gill MD  Evaluating Physician: Braxton Horn MD    Reason for Consultation: COVID-19    History of present illness:    Patient is a 64 y.o. female who has intermittent asthma who has history of tobacco use and quit 3 years ago has multiple family members living with her.  Apparently patient developed headache, diarrhea, dry cough was given a Z-Jules by primary care physician on April 27,020 patient had progressive cough and fever and hypoxia on April 29.    Apparently a therapist that was coming to the house tested positive for COVID prior to patient's symptoms.    Patient admitted to the floor but because of worsening hypoxia and fevers was transferred to the intensive care unit.  Patient given 1 dose of 800 mg of Actemra last night    Patient is stable hemodynamically and on oxygen by nasal cannula currently her sats on 6 L are 91%    CT scan performed high probability for COVID-19 with bilateral groundglass opacities    COVID PCR positive    5/1/2020 patient received 2 units of convalescent serum plasma yesterday is received IV Lasix as well remains on 6 L with O2 sats between 88% and 90%.  Patient is a good spirits is sitting in chair is able to order food for nutrition per telephone call.  Per nursing staff had liquid bowel movements x3 yesterday is otherwise afebrile and hemodynamically stable    Review of systems remark for diarrhea no fevers    5/2/2020; patient required 8 L of oxygen by nasal cannula last night is otherwise hemodynamically stable.  Patient is eating small amounts reports that she ate for sherbet yesterday with the food otherwise states like cardboard.    Patient has intermittent cough which is productive.  She denies fevers rash sore throat ;    Patient was talked to by telephone and inspected  through the window.  Past Medical History:   Diagnosis Date   • Asthma    • Atrial fibrillation (CMS/HCC)    • Coronary artery disease    • Hypertension        Past Surgical History:   Procedure Laterality Date   • BILATERAL BREAST REDUCTION     • BRAIN SURGERY     • DILATATION AND CURETTAGE     • PACEMAKER IMPLANTATION         History reviewed. No pertinent family history.    Social History     Socioeconomic History   • Marital status:      Spouse name: Not on file   • Number of children: Not on file   • Years of education: Not on file   • Highest education level: Not on file   Tobacco Use   • Smoking status: Former Smoker   • Smokeless tobacco: Never Used   • Tobacco comment: QUIT 3 YEARS AGO   Substance and Sexual Activity   • Alcohol use: Never     Frequency: Never   • Drug use: Never   • Sexual activity: Defer       Allergies   Allergen Reactions   • Contrast Dye Swelling     FACE, NOSE AND NECK SWELLING AS WELL AS SANDI    • Doxycycline Provider Review Needed     PT UNSURE OF REACTION    • Flexeril [Cyclobenzaprine] Hives   • Keflex [Cephalexin] Provider Review Needed     PT DOES NOT REMEMBER    • Kenalog [Triamcinolone Acetonide] Itching   • Penicillins Hives   • Sulfa Antibiotics Itching   • Tetracyclines & Related Provider Review Needed     PT UNSURE OF REACTION   • Levaquin [Levofloxacin] Rash         Medication:    Current Facility-Administered Medications:   •  acetaminophen (TYLENOL) tablet 650 mg, 650 mg, Oral, Q6H PRN, Kan Ng MD, 650 mg at 05/01/20 2217  •  albuterol sulfate HFA (PROVENTIL HFA;VENTOLIN HFA;PROAIR HFA) inhaler 2 puff, 2 puff, Inhalation, Q4H PRN, Kan Ng MD, 2 puff at 04/30/20 1612  •  atenolol (TENORMIN) tablet 25 mg, 25 mg, Oral, Daily, Kan Ng MD, 25 mg at 05/02/20 0831  •  dilTIAZem CD (CARDIZEM CD) 24 hr capsule 240 mg, 240 mg, Oral, Daily, Kan Ng MD, 240 mg at 05/02/20 0832  •  diphenhydrAMINE (BENADRYL) capsule 50 mg, 50 mg, Oral, BID PRN,  Kan Ng MD, 50 mg at 20 2219  •  enoxaparin (LOVENOX) syringe 120 mg, 1 mg/kg, Subcutaneous, Q12H, Kan Ng MD, 120 mg at 20 0831  •  magnesium sulfate 4 gram infusion - Mg less than or equal to 1mg/dL, 4 g, Intravenous, PRN **OR** magnesium sulfate 3 gram infusion (1gm x 3) - Mg 1.1 - 1.5 mg/dL, 1 g, Intravenous, PRN **OR** Magnesium Sulfate 2 gram infusion- Mg 1.6 - 1.9 mg/dL, 2 g, Intravenous, PRN, Kan Ng MD, Last Rate: 25 mL/hr at 20 0623, 2 g at 20 0623  •  potassium chloride (MICRO-K) CR capsule 40 mEq, 40 mEq, Oral, PRN, Kan Ng MD, 40 mEq at 20 1221  •  pravastatin (PRAVACHOL) tablet 40 mg, 40 mg, Oral, Nightly, Braxton Horn MD, 40 mg at 20 2019  •  sodium chloride 0.9 % flush 10 mL, 10 mL, Intravenous, PRN, Kan Ng MD  •  sodium chloride 0.9 % flush 10 mL, 10 mL, Intravenous, Q12H, Kan Ng MD, 10 mL at 20 0832  •  sodium chloride 0.9 % flush 10 mL, 10 mL, Intravenous, PRN, Kan Ng MD, 10 mL at 20 0001    Antibiotics:  Anti-Infectives (From admission, onward)    Ordered     Dose/Rate Route Frequency Start Stop    20  vancomycin 2250 mg/500 mL 0.9% NS IVPB ([facility])     Ordering Provider:  Sondra Worrell RPH    20 mg/kg × 118 kg  over 150 Minutes Intravenous Once 20 0022    20 1943  aztreonam (AZACTAM) 1 g in 50 mL NS IVPB     Ordering Provider:  Sondra Worrell RPH    1 g  over 30 Minutes Intravenous Once 20 0200            Review of Systems:  See HPI      Physical Exam:   Vital Signs  Temp (24hrs), Av.6 °F (37 °C), Min:98.1 °F (36.7 °C), Max:99 °F (37.2 °C)    Temp  Min: 98.1 °F (36.7 °C)  Max: 99 °F (37.2 °C)  BP  Min: 83/47  Max: 130/72  Pulse  Min: 61  Max: 85  Resp  Min: 18  Max: 22  SpO2  Min: 83 %  Max: 93 %  Patient seen through window and talk to on the telephone  GENERAL: Awake and alert, pleasant in no distress on oxygen by nasal  cannula  HEENT: Normocephalic, atraumatic.  EOMI no external oral lesions    HEART: Sinus rhythm by telemetry  LUNGS: Bilateral chest wall expansion   ABDOMEN: Bowel sounds were present.  No obvious abdominal distention on exam    MSK: Waving with right hand    NEURO: Oriented to PPT.  Able to have an appropriate conversation  PSYCHIATRIC:  Normal affect    Laboratory Data    Results from last 7 days   Lab Units 05/02/20  0346 05/01/20  0343 04/30/20  0309   WBC 10*3/mm3 2.54* 3.80 4.71   HEMOGLOBIN g/dL 13.5 13.9 13.7   HEMATOCRIT % 44.1 44.9 43.1   PLATELETS 10*3/mm3 253 227 196     Results from last 7 days   Lab Units 05/02/20  0346   SODIUM mmol/L 138   POTASSIUM mmol/L 4.5   CHLORIDE mmol/L 96*   CO2 mmol/L 26.0   BUN mg/dL 21   CREATININE mg/dL 0.83   GLUCOSE mg/dL 155*   CALCIUM mg/dL 8.3*     Results from last 7 days   Lab Units 05/01/20  0343   ALK PHOS U/L 62   BILIRUBIN mg/dL 0.3   ALT (SGPT) U/L 11   AST (SGOT) U/L 34*         Results from last 7 days   Lab Units 05/02/20  0346   CRP mg/dL 3.99*     Results from last 7 days   Lab Units 04/29/20  1511   LACTATE mmol/L 1.6             Estimated Creatinine Clearance: 82.4 mL/min (by C-G formula based on SCr of 0.83 mg/dL).      Microbiology:  No results found for: ACANTHNAEG, AFBCX, BPERTUSSISCX, BLOODCX  No results found for: BCIDPCR, CXREFLEX, CSFCX, CULTURETIS  No results found for: CULTURES, HSVCX, URCX  No results found for: EYECULTURE, GCCX, LABHSV  No results found for: LEGIONELLA, MRSACX, MUMPSCX, MYCOPLASCX  No results found for: NOCARDIACX, STOOLCX  No results found for: THROATCX, UNSTIMCULT, URINECX, CULTURE, VZVCULTUR  No results found for: VIRALCULTU, WOUNDCX        Radiology:  Imaging Results (Last 72 Hours)     Procedure Component Value Units Date/Time    XR Chest 1 View [[id number]] Collected:  05/01/20 0842     Updated:  05/01/20 2244    Narrative:       EXAMINATION: XR CHEST 1 VW-     INDICATION: Resp Distress; J96.01-Acute respiratory  failure with  hypoxia; U07.1-COVID-19     COMPARISON: 04/29/2020     FINDINGS: Patchy disease in the right upper lung has moderately  increased. There is mildly increased interstitial change in the left  perihilar region and right base. No dense lung consolidation effusion or  pneumothorax is seen. The heart is mildly enlarged. Vasculature is  slightly cephalized.          Impression:       Increased patchy bilateral interstitial disease, greater in  the right upper lung than elsewhere.         This report was finalized on 5/1/2020 10:41 PM by Dr. Jose Martell MD.       NM Lung Scan Perfusion Particulate [[id number]] Collected:  04/29/20 1825     Updated:  04/30/20 1642    Narrative:       EXAMINATION: NM LUNG SCAN, PERFUSION PARTICULATE-04/29/2020:     INDICATION: Hypoxemia in COVID patient with IV dye allergy; J96.01-Acute  respiratory failure with hypoxia; U07.1-COVID-19.      TECHNIQUE: Nuclear medicine perfusion scan performed with 5.5 mCi of MAA  administered.     COMPARISON: NONE.     FINDINGS: Heterogeneity of perfusion correlating with acute parenchymal  disease process on chest x-ray performed same day, however, no large  defect to suggest perfusional abnormality.       Impression:       Heterogeneity of chronic findings along with acute airspace  disease as seen on chest x-ray performed same day without perfusional  defect otherwise identified, overall low probability for perfusional  abnormality. If symptoms persistent and/or progress, CT angiogram of the  chest may be considered for further evaluation.     D:  04/29/2020  E:  04/30/2020            This report was finalized on 4/30/2020 4:38 PM by Dr. Homer Zhu.       XR Chest 1 View [[id number]] Collected:  04/29/20 1732     Updated:  04/30/20 1641    Narrative:       EXAMINATION: XR CHEST 1 VW-04/29/2020:      INDICATION: COVID pneumonia.      COMPARISON: NONE.     FINDINGS: Cardiomegaly with increased pulmonary vascularity. Scattered  right mid and  lower lung opacifications consistent with acute airspace  disease. No pneumothorax or pleural effusion. Degenerative changes of  the spine. Left chest wall pacing device with leads intact.           Impression:       Patchy opacifications throughout the right mid and lower  lung consistent with acute airspace disease such as bronchopneumonia. No  pneumothorax or pleural effusion.     D:  04/29/2020  E:  04/30/2020     This report was finalized on 4/30/2020 4:38 PM by Dr. Homer Zhu.       CT Chest Without Contrast [[id number]] Collected:  04/30/20 0242     Updated:  04/30/20 0244    Narrative:       CT CHEST, NONCONTRAST, 4/30/2020    HISTORY:  64-year-old female hospital inpatient with acute respiratory failure with hypoxia. Positive testing for COVID-19 about one week prior.    TECHNIQUE:  CT imaging of the chest without IV contrast. Radiation dose reduction techniques included automated exposure control. Radiation audit for CT and nuclear cardiology exams in the last 12 months: 0.    FINDINGS:  Extensive multifocal regions of patchy groundglass opacity are scattered throughout the both lungs in association with thickening of the interlobular and intralobular septa in these regions. These are commonly reported imaging features of COVID-19  pneumonia.    There is no dense airspace consolidation or pleural effusion. Trachea and central bronchi are normal in caliber and widely patent. No thoracic adenopathy.    Mild cardiomegaly, but no pericardial effusion. Normal caliber thoracic aorta. Cardiac pacemaker. Limited upper abdominal images showing cholelithiasis.      Impression:       1.  Extensive multifocal regions of moderately dense, patchy groundglass pulmonary opacity throughout both lungs.  2.  COVID-19 reporting category: Typical. Commonly reported imaging features of COVID-19 pneumonia are present. Viral testing reportedly confirms the diagnosis.    Signer Name: Hesham Veliz MD   Signed: 4/30/2020 2:42  AM   Workstation Name: [id number]    [facility]            Impression:   CO VID 19 pneumonitis  Hypoxia  Acute respiratory failure  Probable cytokine storm manifested by elevated ferritin elevated LDH, hypoxia  Penicillin allergy; questionable throat swelling  Doxycycline intolerance    Patient has received Actemra on 4/29  Received 2 units of serum convalescent plasma therapy on 4/30    PLAN/RECOMMENDATIONS:   Thank you for asking us to see Marlene Aguirre, I recommend the following:    Continue supportive care agree with diuresis  Agree with Lovenox for DVT prophylaxis  Continue pravastatin for cardioprotection        Watch patient for clinical deterioration in severe cases of cytokine storm patient may require more than 1 dose of Actemra.    IL-6 level is 130 on 4/30/2020 prior to dose of Actemra    5/2/2020 ferritin 889, d-dimer 0.71 , CRP 3.99; except for the CRP the LDH and ferritin are higher than on 4/30/2020      Hopefully oxygenation will continue to improve    Continue supplemental oxygen; options at this point are repeating an additional dose of Actemra   Agree with one-time dose of steroids on 5/1/2020 10 mg of dexamethasone    Upon worsening oxygenation may require intubation    Patient is stable with exception of hypoxia    Critical care time 31 minutes  Discussed case with Dr. Rickie Luna    Time in 1031 time out 1100  Braxton Horn MD  5/2/2020  11:45

## 2020-05-02 NOTE — PROGRESS NOTES
"Intensive Care Follow-up     Hospital:  LOS: 3 days   Ms. Marlene Aguirre, 64 y.o. female is followed for:   Pneumonia due to COVID-19 virus        Subjective   Interval History:  The chart has been reviewed.  The patient has been requiring anywhere from 6 to 8 L of nasal cannula oxygen to maintain saturations above 88%.  She states that she does not feel febrile although she is very short of breath when moving.  She also has been complaining of some pain in her left lateral ankle.  There is no discoloration.  She has had minimal swelling.    The patient's past medical, surgical and social history were reviewed and updated in Epic as appropriate.        Objective     Infusions:     Medications:    atenolol 25 mg Oral Daily   dexamethasone 10 mg Intravenous Once   dilTIAZem  mg Oral Daily   enoxaparin 1 mg/kg Subcutaneous Q12H   pravastatin 40 mg Oral Nightly   sodium chloride 10 mL Intravenous Q12H       Vital Sign Min/Max for last 24 hours  Temp  Min: 97.9 °F (36.6 °C)  Max: 99 °F (37.2 °C)   BP  Min: 83/47  Max: 130/72   Pulse  Min: 61  Max: 85   Resp  Min: 20  Max: 22   SpO2  Min: 83 %  Max: 93 %   Flow (L/min)  Min: 6  Max: 8       Input/Output for last 24 hour shift  05/01 0701 - 05/02 0700  In: 273.8 [P.O.:60; I.V.:213.8]  Out: 1325 [Urine:1325]      Objective:  General Appearance:  Ill-appearing, in no acute distress, not in pain and comfortable.    Vital signs: (most recent): Blood pressure 106/65, pulse 67, temperature 97.9 °F (36.6 °C), temperature source Oral, resp. rate 21, height 160 cm (63\"), weight 112 kg (247 lb 12.8 oz), SpO2 (!) 87 %.    HEENT: Normal HEENT exam.    Lungs:  Normal effort and normal respiratory rate.  Breath sounds clear to auscultation.  No rales, decreased breath sounds, wheezes or rhonchi.    Heart: Normal rate.  Regular rhythm.  S1 normal.  No murmur.   Abdomen: Abdomen is soft and non-distended.  Bowel sounds are normal.   There is no abdominal tenderness.   "   Extremities: (Minimal bilateral lower extremity edema.  No discoloration of the left ankle.  Mildly tender to palpation.)  Neurological: Patient is alert and oriented to person, place and time.    Pupils:  Pupils are equal, round, and reactive to light.  Pupils are equal.   Skin:  Warm.  No rash or cyanosis.             Results from last 7 days   Lab Units 05/02/20  0346 05/01/20  0343 04/30/20  0309   WBC 10*3/mm3 2.54* 3.80 4.71   HEMOGLOBIN g/dL 13.5 13.9 13.7   PLATELETS 10*3/mm3 253 227 196     Results from last 7 days   Lab Units 05/02/20  0346 05/01/20  0343 04/30/20  1719 04/30/20  0309   SODIUM mmol/L 138 139  --  135*   POTASSIUM mmol/L 4.5 4.2 3.7 3.4*   CO2 mmol/L 26.0 27.0  --  22.0   BUN mg/dL 21 16  --  14   CREATININE mg/dL 0.83 0.82  --  0.73   MAGNESIUM mg/dL 2.2 1.9  --  1.8   PHOSPHORUS mg/dL  --  2.7  --   --    GLUCOSE mg/dL 155* 108*  --  144*     Estimated Creatinine Clearance: 82.4 mL/min (by C-G formula based on SCr of 0.83 mg/dL).    Results from last 7 days   Lab Units 04/29/20  1638   PH, ARTERIAL pH units 7.420   PCO2, ARTERIAL mm Hg 41.5   PO2 ART mm Hg 68.3*         I reviewed the patient's results and images.     Assessment/Plan   Impression        COVID-19 Pneumonitis    Acute hypoxemic respiratory failure (CMS/HCC)    Fever    Paroxysmal atrial fibrillation (CMS/HCC)    Obesity (BMI 30-39.9)       Plan        I would like to go ahead and repeat the dose of dexamethasone to help cut down on any inflammation.  Continue with oxygen weaning as tolerated.  If she were to worsen any further we may elect to perform intubation although at this point I would not like to electively do this.  She has finished her diuresis and we will assess for further need tomorrow.  I was able to examine her left extremity and there is only minimal swelling and some tenderness.  We will watch this closely.  She is currently on DVT prophylaxis.  Follow-up orders have been placed for tomorrow.  She remains  at high risk for worsening secondary to the COVID-19 infection resulting in hypoxemic respiratory failure.    Plan of care and goals reviewed with mulitdisciplinary/antibiotic stewardship team during rounds.   I discussed the patient's findings and my recommendations with patient and nursing staff     High level of risk due to:  illness with threat to life or bodily function.      Rickie Luna MD, [facility]  Pulmonology and Critical Care Medicine

## 2020-05-02 NOTE — PLAN OF CARE
Problem: Patient Care Overview  Goal: Plan of Care Review  Outcome: Ongoing (interventions implemented as appropriate)  Flowsheets  Taken 5/2/2020 1600  Plan of Care Reviewed With: patient  Taken 5/2/2020 9656  Outcome Summary: Pt maintained stats >88% on 8LNC.  Pt up to chair with assist of 1. Pt up to bedside commode with assist of 1.  BM x 1.  UOP adequate.  Dexamethasone given x 1. BP and HR stable.

## 2020-05-02 NOTE — PLAN OF CARE
Pt was on 8 L NC at beginning of shift, I attempted to wean fio2 was unable, Dr Laguerre made aware of o2 requirements. Hi veronica ordered if pt unable to maintain spo2 above 88% on 8 L NC, RT aware. Dexamethasone given iv one time. Pt was able to maintain spo2, Hi Veronica not started. Lasix given at MN. Pt has extreme activity intolerance.

## 2020-05-02 NOTE — SIGNIFICANT NOTE
Patient with COVID 19 related ARDS.  Oxygen requirement worsening.  Clear evidence of inflammatory state.  Will give a dose of dexamethasone 10 mg IV tonight.  Day team to consider whether or not to continue depending on response.  Sats currently marginal on 8 lpm NC, will likely need HFNC.    Kvng Laguerre MD  Pulmonology and Critical Care Medicine  05/01/20 21:50  Electronically Signed

## 2020-05-03 LAB
ANION GAP SERPL CALCULATED.3IONS-SCNC: 13 MMOL/L (ref 5–15)
BASOPHILS # BLD AUTO: 0.01 10*3/MM3 (ref 0–0.2)
BASOPHILS NFR BLD AUTO: 0.2 % (ref 0–1.5)
BUN BLD-MCNC: 28 MG/DL (ref 8–23)
BUN/CREAT SERPL: 38.4 (ref 7–25)
CALCIUM SPEC-SCNC: 8.7 MG/DL (ref 8.6–10.5)
CHLORIDE SERPL-SCNC: 97 MMOL/L (ref 98–107)
CO2 SERPL-SCNC: 27 MMOL/L (ref 22–29)
CREAT BLD-MCNC: 0.73 MG/DL (ref 0.57–1)
DEPRECATED RDW RBC AUTO: 40.5 FL (ref 37–54)
EOSINOPHIL # BLD AUTO: 0 10*3/MM3 (ref 0–0.4)
EOSINOPHIL NFR BLD AUTO: 0 % (ref 0.3–6.2)
ERYTHROCYTE [DISTWIDTH] IN BLOOD BY AUTOMATED COUNT: 12.2 % (ref 12.3–15.4)
GFR SERPL CREATININE-BSD FRML MDRD: 80 ML/MIN/1.73
GLUCOSE BLD-MCNC: 163 MG/DL (ref 65–99)
HCT VFR BLD AUTO: 43.5 % (ref 34–46.6)
HGB BLD-MCNC: 13.6 G/DL (ref 12–15.9)
IMM GRANULOCYTES # BLD AUTO: 0.05 10*3/MM3 (ref 0–0.05)
IMM GRANULOCYTES NFR BLD AUTO: 0.9 % (ref 0–0.5)
LYMPHOCYTES # BLD AUTO: 1.06 10*3/MM3 (ref 0.7–3.1)
LYMPHOCYTES NFR BLD AUTO: 18.9 % (ref 19.6–45.3)
MCH RBC QN AUTO: 28.3 PG (ref 26.6–33)
MCHC RBC AUTO-ENTMCNC: 31.3 G/DL (ref 31.5–35.7)
MCV RBC AUTO: 90.6 FL (ref 79–97)
MONOCYTES # BLD AUTO: 0.18 10*3/MM3 (ref 0.1–0.9)
MONOCYTES NFR BLD AUTO: 3.2 % (ref 5–12)
NEUTROPHILS # BLD AUTO: 4.3 10*3/MM3 (ref 1.7–7)
NEUTROPHILS NFR BLD AUTO: 76.8 % (ref 42.7–76)
NRBC BLD AUTO-RTO: 0 /100 WBC (ref 0–0.2)
PLAT MORPH BLD: NORMAL
PLATELET # BLD AUTO: 292 10*3/MM3 (ref 140–450)
PMV BLD AUTO: 11.1 FL (ref 6–12)
POTASSIUM BLD-SCNC: 4.5 MMOL/L (ref 3.5–5.2)
RBC # BLD AUTO: 4.8 10*6/MM3 (ref 3.77–5.28)
RBC MORPH BLD: NORMAL
SODIUM BLD-SCNC: 137 MMOL/L (ref 136–145)
WBC MORPH BLD: NORMAL
WBC NRBC COR # BLD: 5.6 10*3/MM3 (ref 3.4–10.8)

## 2020-05-03 PROCEDURE — 99233 SBSQ HOSP IP/OBS HIGH 50: CPT | Performed by: INTERNAL MEDICINE

## 2020-05-03 PROCEDURE — 25010000002 ENOXAPARIN PER 10 MG: Performed by: INTERNAL MEDICINE

## 2020-05-03 PROCEDURE — 80048 BASIC METABOLIC PNL TOTAL CA: CPT | Performed by: INTERNAL MEDICINE

## 2020-05-03 PROCEDURE — 94799 UNLISTED PULMONARY SVC/PX: CPT

## 2020-05-03 PROCEDURE — 85025 COMPLETE CBC W/AUTO DIFF WBC: CPT | Performed by: INTERNAL MEDICINE

## 2020-05-03 PROCEDURE — 85007 BL SMEAR W/DIFF WBC COUNT: CPT | Performed by: INTERNAL MEDICINE

## 2020-05-03 RX ORDER — BUMETANIDE 0.25 MG/ML
2 INJECTION INTRAMUSCULAR; INTRAVENOUS ONCE
Status: COMPLETED | OUTPATIENT
Start: 2020-05-03 | End: 2020-05-03

## 2020-05-03 RX ORDER — ECHINACEA PURPUREA EXTRACT 125 MG
2 TABLET ORAL AS NEEDED
Status: DISCONTINUED | OUTPATIENT
Start: 2020-05-03 | End: 2020-05-12 | Stop reason: HOSPADM

## 2020-05-03 RX ADMIN — ENOXAPARIN SODIUM 120 MG: 120 INJECTION SUBCUTANEOUS at 08:25

## 2020-05-03 RX ADMIN — DILTIAZEM HYDROCHLORIDE 240 MG: 240 CAPSULE, COATED, EXTENDED RELEASE ORAL at 08:26

## 2020-05-03 RX ADMIN — ACETAMINOPHEN 650 MG: 325 TABLET, FILM COATED ORAL at 08:26

## 2020-05-03 RX ADMIN — SODIUM CHLORIDE, PRESERVATIVE FREE 10 ML: 5 INJECTION INTRAVENOUS at 08:27

## 2020-05-03 RX ADMIN — PRAVASTATIN SODIUM 40 MG: 40 TABLET ORAL at 21:14

## 2020-05-03 RX ADMIN — ENOXAPARIN SODIUM 120 MG: 120 INJECTION SUBCUTANEOUS at 21:14

## 2020-05-03 RX ADMIN — ACETAMINOPHEN 650 MG: 325 TABLET, FILM COATED ORAL at 14:23

## 2020-05-03 RX ADMIN — ATENOLOL 25 MG: 25 TABLET ORAL at 08:26

## 2020-05-03 RX ADMIN — ACETAMINOPHEN 650 MG: 325 TABLET, FILM COATED ORAL at 21:14

## 2020-05-03 RX ADMIN — BUMETANIDE 2 MG: 0.25 INJECTION, SOLUTION INTRAMUSCULAR; INTRAVENOUS at 12:32

## 2020-05-03 NOTE — PLAN OF CARE
Pt remains on HFNC, able to wean to 64% 30L. States she does not feel as good today as yesterday. But eating well, adequate UOP , up to chair most of the day,  one time 2mg IV Bumex given. C/o headache, PRN helps only a little.

## 2020-05-03 NOTE — PROGRESS NOTES
"Intensive Care Follow-up     Hospital:  LOS: 4 days   Ms. Marlene Aguirre, 64 y.o. female is followed for:   Pneumonia due to COVID-19 virus        Subjective   Interval History:  The chart has been reviewed.  The patient required elevation to high flow nasal cannula oxygen last evening.  She states that she still feels short of breath although is feeling a bit better with a higher flow.  She has minimal cough.  She has remained afebrile.  Hemodynamics have remained stable.    The patient's past medical, surgical and social history were reviewed and updated in Epic as appropriate.        Objective     Infusions:     Medications:    atenolol 25 mg Oral Daily   bumetanide 2 mg Intravenous Once   dilTIAZem  mg Oral Daily   enoxaparin 1 mg/kg Subcutaneous Q12H   pravastatin 40 mg Oral Nightly   sodium chloride 10 mL Intravenous Q12H       Vital Sign Min/Max for last 24 hours  Temp  Min: 97.9 °F (36.6 °C)  Max: 98.5 °F (36.9 °C)   BP  Min: 105/57  Max: 132/60   Pulse  Min: 59  Max: 87   Resp  Min: 16  Max: 22   SpO2  Min: 83 %  Max: 93 %   Flow (L/min)  Min: 8  Max: 30       Input/Output for last 24 hour shift  05/02 0701 - 05/03 0700  In: 110 [P.O.:60]  Out: 725 [Urine:725]      Objective:  General Appearance:  Ill-appearing, in no acute distress, not in pain and uncomfortable.    Vital signs: (most recent): Blood pressure 116/70, pulse 63, temperature 97.9 °F (36.6 °C), resp. rate 20, height 160 cm (63\"), weight 115 kg (253 lb 8.5 oz), SpO2 91 %.    HEENT: Normal HEENT exam.  (High flow nasal cannula oxygen)    Lungs:  Normal effort and normal respiratory rate.  Breath sounds clear to auscultation.  No rales, decreased breath sounds, wheezes or rhonchi.    Heart: Normal rate.  Regular rhythm.  S1 normal and S2 normal.  No murmur or friction rub.   Chest: Symmetric chest wall expansion.   Abdomen: Abdomen is soft and non-distended.  Bowel sounds are normal.   There is no abdominal tenderness.     Extremities: " (Minimal bilateral lower extremity edema.  No discoloration of the left ankle.  Mildly tender to palpation.)  Neurological: Patient is alert and oriented to person, place and time.  Normal strength.    Pupils:  Pupils are equal, round, and reactive to light.  Pupils are equal.   Skin:  Warm.  No rash or cyanosis.             Results from last 7 days   Lab Units 05/03/20  0626 05/02/20  0346 05/01/20  0343   WBC 10*3/mm3 5.60 2.54* 3.80   HEMOGLOBIN g/dL 13.6 13.5 13.9   PLATELETS 10*3/mm3 292 253 227     Results from last 7 days   Lab Units 05/03/20  0627 05/02/20  0346 05/01/20  0343  04/30/20  0309   SODIUM mmol/L 137 138 139  --  135*   POTASSIUM mmol/L 4.5 4.5 4.2   < > 3.4*   CO2 mmol/L 27.0 26.0 27.0  --  22.0   BUN mg/dL 28* 21 16  --  14   CREATININE mg/dL 0.73 0.83 0.82  --  0.73   MAGNESIUM mg/dL  --  2.2 1.9  --  1.8   PHOSPHORUS mg/dL  --   --  2.7  --   --    GLUCOSE mg/dL 163* 155* 108*  --  144*    < > = values in this interval not displayed.     Estimated Creatinine Clearance: 95.1 mL/min (by C-G formula based on SCr of 0.73 mg/dL).    Results from last 7 days   Lab Units 04/29/20  1638   PH, ARTERIAL pH units 7.420   PCO2, ARTERIAL mm Hg 41.5   PO2 ART mm Hg 68.3*       I reviewed the patient's results and images.     Assessment/Plan   Impression        COVID-19 Pneumonitis    Acute hypoxemic respiratory failure (CMS/HCC)    Fever    Paroxysmal atrial fibrillation (CMS/HCC)    Obesity (BMI 30-39.9)       Plan        I would like to go ahead and further diurese the patient with 2 mg of IV Bumex.  That said, she does not have much in the way of rales on her examination and I suspect that the majority of her respiratory failure is secondary to pneumonitis from the COVID-19 infection.  Continue with oxygen support.  We will mobilize as tolerated.  Continue current DVT prophylaxis.  She does remain at high risk for worsening secondary to respiratory failure which seems to be worsening.  Maintain  isolation.  Chest x-ray has been ordered for tomorrow morning.    Plan of care and goals reviewed with mulitdisciplinary/antibiotic stewardship team during rounds.   I discussed the patient's findings and my recommendations with patient and nursing staff     High level of risk due to:  severe exacerbation of chronic illness and illness with threat to life or bodily function.      Rickie Luna MD, [facility]  Pulmonology and Critical Care Medicine

## 2020-05-03 NOTE — PLAN OF CARE
Pt's O2 requirements increased, hi veronica NC applied 80% fio2 with 30 lpm flow, spo2 stable thru out night. UOP less than previous shifts, pt voids, no diuretics ordered last night.

## 2020-05-04 ENCOUNTER — APPOINTMENT (OUTPATIENT)
Dept: GENERAL RADIOLOGY | Facility: HOSPITAL | Age: 65
End: 2020-05-04

## 2020-05-04 LAB
ANION GAP SERPL CALCULATED.3IONS-SCNC: 13 MMOL/L (ref 5–15)
BACTERIA SPEC AEROBE CULT: NORMAL
BACTERIA SPEC AEROBE CULT: NORMAL
BASOPHILS # BLD AUTO: 0.01 10*3/MM3 (ref 0–0.2)
BASOPHILS NFR BLD AUTO: 0.1 % (ref 0–1.5)
BUN BLD-MCNC: 28 MG/DL (ref 8–23)
BUN/CREAT SERPL: 38.9 (ref 7–25)
CALCIUM SPEC-SCNC: 8.4 MG/DL (ref 8.6–10.5)
CHLORIDE SERPL-SCNC: 96 MMOL/L (ref 98–107)
CO2 SERPL-SCNC: 28 MMOL/L (ref 22–29)
CREAT BLD-MCNC: 0.72 MG/DL (ref 0.57–1)
CRP SERPL-MCNC: 0.66 MG/DL (ref 0–0.5)
DEPRECATED RDW RBC AUTO: 39.4 FL (ref 37–54)
EOSINOPHIL # BLD AUTO: 0 10*3/MM3 (ref 0–0.4)
EOSINOPHIL NFR BLD AUTO: 0 % (ref 0.3–6.2)
ERYTHROCYTE [DISTWIDTH] IN BLOOD BY AUTOMATED COUNT: 12.3 % (ref 12.3–15.4)
FERRITIN SERPL-MCNC: 854.4 NG/ML (ref 13–150)
GFR SERPL CREATININE-BSD FRML MDRD: 82 ML/MIN/1.73
GLUCOSE BLD-MCNC: 141 MG/DL (ref 65–99)
HCT VFR BLD AUTO: 41 % (ref 34–46.6)
HGB BLD-MCNC: 13.4 G/DL (ref 12–15.9)
IMM GRANULOCYTES # BLD AUTO: 0.05 10*3/MM3 (ref 0–0.05)
IMM GRANULOCYTES NFR BLD AUTO: 0.7 % (ref 0–0.5)
LDH SERPL-CCNC: 540 U/L (ref 135–214)
LYMPHOCYTES # BLD AUTO: 1.4 10*3/MM3 (ref 0.7–3.1)
LYMPHOCYTES NFR BLD AUTO: 18.9 % (ref 19.6–45.3)
MCH RBC QN AUTO: 29 PG (ref 26.6–33)
MCHC RBC AUTO-ENTMCNC: 32.7 G/DL (ref 31.5–35.7)
MCV RBC AUTO: 88.7 FL (ref 79–97)
MONOCYTES # BLD AUTO: 0.44 10*3/MM3 (ref 0.1–0.9)
MONOCYTES NFR BLD AUTO: 5.9 % (ref 5–12)
NEUTROPHILS # BLD AUTO: 5.5 10*3/MM3 (ref 1.7–7)
NEUTROPHILS NFR BLD AUTO: 74.4 % (ref 42.7–76)
NRBC BLD AUTO-RTO: 0 /100 WBC (ref 0–0.2)
PLAT MORPH BLD: NORMAL
PLATELET # BLD AUTO: 317 10*3/MM3 (ref 140–450)
PMV BLD AUTO: 11 FL (ref 6–12)
POTASSIUM BLD-SCNC: 3.9 MMOL/L (ref 3.5–5.2)
RBC # BLD AUTO: 4.62 10*6/MM3 (ref 3.77–5.28)
RBC MORPH BLD: NORMAL
SODIUM BLD-SCNC: 137 MMOL/L (ref 136–145)
WBC MORPH BLD: NORMAL
WBC NRBC COR # BLD: 7.4 10*3/MM3 (ref 3.4–10.8)

## 2020-05-04 PROCEDURE — 83615 LACTATE (LD) (LDH) ENZYME: CPT | Performed by: INTERNAL MEDICINE

## 2020-05-04 PROCEDURE — 25010000002 ENOXAPARIN PER 10 MG: Performed by: INTERNAL MEDICINE

## 2020-05-04 PROCEDURE — 80048 BASIC METABOLIC PNL TOTAL CA: CPT | Performed by: INTERNAL MEDICINE

## 2020-05-04 PROCEDURE — 86140 C-REACTIVE PROTEIN: CPT | Performed by: INTERNAL MEDICINE

## 2020-05-04 PROCEDURE — 82728 ASSAY OF FERRITIN: CPT | Performed by: INTERNAL MEDICINE

## 2020-05-04 PROCEDURE — 71045 X-RAY EXAM CHEST 1 VIEW: CPT

## 2020-05-04 PROCEDURE — 99232 SBSQ HOSP IP/OBS MODERATE 35: CPT | Performed by: INTERNAL MEDICINE

## 2020-05-04 PROCEDURE — 85025 COMPLETE CBC W/AUTO DIFF WBC: CPT | Performed by: INTERNAL MEDICINE

## 2020-05-04 PROCEDURE — 85007 BL SMEAR W/DIFF WBC COUNT: CPT | Performed by: INTERNAL MEDICINE

## 2020-05-04 RX ADMIN — ENOXAPARIN SODIUM 120 MG: 120 INJECTION SUBCUTANEOUS at 20:14

## 2020-05-04 RX ADMIN — PRAVASTATIN SODIUM 40 MG: 40 TABLET ORAL at 20:14

## 2020-05-04 RX ADMIN — ACETAMINOPHEN 650 MG: 325 TABLET, FILM COATED ORAL at 20:39

## 2020-05-04 RX ADMIN — SODIUM CHLORIDE, PRESERVATIVE FREE 10 ML: 5 INJECTION INTRAVENOUS at 08:48

## 2020-05-04 RX ADMIN — ACETAMINOPHEN 650 MG: 325 TABLET, FILM COATED ORAL at 05:01

## 2020-05-04 RX ADMIN — SALINE NASAL SPRAY 2 SPRAY: 1.5 SOLUTION NASAL at 00:15

## 2020-05-04 RX ADMIN — SALINE NASAL SPRAY 2 SPRAY: 1.5 SOLUTION NASAL at 20:16

## 2020-05-04 RX ADMIN — ATENOLOL 25 MG: 25 TABLET ORAL at 08:47

## 2020-05-04 RX ADMIN — ACETAMINOPHEN 650 MG: 325 TABLET, FILM COATED ORAL at 11:32

## 2020-05-04 RX ADMIN — ENOXAPARIN SODIUM 120 MG: 120 INJECTION SUBCUTANEOUS at 08:47

## 2020-05-04 RX ADMIN — DILTIAZEM HYDROCHLORIDE 240 MG: 240 CAPSULE, COATED, EXTENDED RELEASE ORAL at 08:47

## 2020-05-04 RX ADMIN — SODIUM CHLORIDE, PRESERVATIVE FREE 10 ML: 5 INJECTION INTRAVENOUS at 20:15

## 2020-05-04 NOTE — PROGRESS NOTES
INTENSIVIST   PROGRESS NOTE     Hospital:  LOS: 5 days     Ms. Marlene Aguirre, 64 y.o. female is followed for a Chief Complaint of: Shortness of Breath      Subjective   S     Interval History:  No acute events. Remains on HFNC at 30L and 45% FiO2.        The patient's relevant past medical, surgical and social history were reviewed and updated in Epic as appropriate.      ROS:   Constitutional: Negative for fever.   Respiratory: Positive for dyspnea.   Cardiovascular: Negative for chest pain.   Gastrointestinal: Negative for  nausea, vomiting and diarrhea.     Objective   O     Vitals:  Temp  Min: 96.9 °F (36.1 °C)  Max: 98 °F (36.7 °C)  BP  Min: 103/57  Max: 132/69  Pulse  Min: 59  Max: 70  Resp  Min: 16  Max: 24  SpO2  Min: 90 %  Max: 96 % Flow (L/min)  Min: 25  Max: 30    Intake/Ouptut 24 hrs (7:00AM - 6:59 AM)  Intake & Output (last 3 days)       05/01 0701 - 05/02 0700 05/02 0701 - 05/03 0700 05/03 0701 - 05/04 0700 05/04 0701 - 05/05 0700    P.O. 60 60 530 200    I.V. (mL/kg) 213.8 (1.9)   10 (0.1)    Blood        IV Piggyback  50      Total Intake(mL/kg) 273.8 (2.4) 110 (1) 530 (4.8) 210 (1.9)    Urine (mL/kg/hr) 1325 (0.5) 725 (0.3) 1000 (0.4) 200 (0.3)    Stool 0 0 0 0    Total Output 7507 314 7539 200    Net -1051.2 -615 -470 +10            Urine Unmeasured Occurrence 2 x 1 x 2 x     Stool Unmeasured Occurrence 2 x 1 x 2 x 1 x            Physical Examination  Telemetry:  Normal sinus rhythm.    Constitutional:  No acute distress.  Sitting up in a chair on HFNC.    Eyes: No scleral icterus.   PERRL, EOM intact.    Neck:  Supple, FROM   Cardiovascular: Normal rate, regular and rhythm. Normal heart sounds.  No murmurs, gallop or rub.   Respiratory: No respiratory distress. Normal respiratory effort.  Diminished bilaterally. No wheezing.     Abdominal:  Soft. No masses. Non-tender. No distension. No HSM.   Extremities: No digital cyanosis. No clubbing.  No peripheral edema.   Skin: No rashes, lesions or  ulcers   Neurological:   Alert and Oriented to person, place, and time.              Results from last 7 days   Lab Units 05/04/20  0408 05/03/20  0626 05/02/20  0346   WBC 10*3/mm3 7.40 5.60 2.54*   HEMOGLOBIN g/dL 13.4 13.6 13.5   MCV fL 88.7 90.6 92.6   PLATELETS 10*3/mm3 317 292 253     Results from last 7 days   Lab Units 05/04/20  0408 05/03/20  0627 05/02/20  0346 05/01/20  0343  04/30/20  0309   SODIUM mmol/L 137 137 138 139  --  135*   POTASSIUM mmol/L 3.9 4.5 4.5 4.2   < > 3.4*   CO2 mmol/L 28.0 27.0 26.0 27.0  --  22.0   CREATININE mg/dL 0.72 0.73 0.83 0.82  --  0.73   GLUCOSE mg/dL 141* 163* 155* 108*  --  144*   MAGNESIUM mg/dL  --   --  2.2 1.9  --  1.8   PHOSPHORUS mg/dL  --   --   --  2.7  --   --     < > = values in this interval not displayed.     Estimated Creatinine Clearance: 94.5 mL/min (by C-G formula based on SCr of 0.72 mg/dL).  Results from last 7 days   Lab Units 05/01/20  0343 04/29/20  1511   ALK PHOS U/L 62 65   BILIRUBIN mg/dL 0.3 0.4   ALT (SGPT) U/L 11 7   AST (SGOT) U/L 34* 25       Results from last 7 days   Lab Units 04/29/20  1638   PH, ARTERIAL pH units 7.420   PCO2, ARTERIAL mm Hg 41.5   PO2 ART mm Hg 68.3*   FIO2 % 28       Images:  Imaging Results (Last 24 Hours)     Procedure Component Value Units Date/Time    XR Chest 1 View [[id number]] Collected:  05/04/20 0818     Updated:  05/04/20 0952    Narrative:       EXAMINATION: XR CHEST 1 VW- 05/04/2020     INDICATION: J96.01-Acute respiratory failure with hypoxia;  U07.1-COVID-19      COMPARISON: 05/01/2020     FINDINGS: Portable chest reveals patchy airspace disease seen within the  right upper lobe which is stable and unchanged. The heart is enlarged.  Increased markings seen within left mid and lower lung field.  Degenerative changes seen within the spine. Cardiac pacer leads in  satisfactory position.           Impression:       Some improvement seen in aeration of the right upper and  left lower lung field. The heart is  enlarged. Pacer leads remain in  satisfactory position.     D:  05/04/2020  E:  05/04/2020     This report was finalized on 5/4/2020 9:49 AM by Dr. Suzy Muhammad MD.                 Results: Reviewed.  I reviewed the patient's new laboratory and imaging results.  I independently reviewed the patient's new images.    Medications: Reviewed.    Assessment/Plan   A / P     Ms. Aguirre is a 63yo F who is admitted for COVID-19 pneumonia. She was transferred to the ICU secondary to worsening hypoxia and fevers. She has received convalescent plasma and Actemra. She has remained on HFNC.     Nutrition:   Diet Regular; Cardiac  Advance Directives:   Code Status and Medical Interventions:   Ordered at: 04/29/20 1943     Code Status:    CPR     Medical Interventions (Level of Support Prior to Arrest):    Full       Active Hospital Problems    Diagnosis   • **COVID-19 Pneumonitis   • Acute hypoxemic respiratory failure (CMS/HCC)   • Fever   • Paroxysmal atrial fibrillation (CMS/HCC)   • Obesity (BMI 30-39.9)       Assessment / Plan:    1. Wean HFNC as tolerated. May be able to transition to low-flow nasal cannula as tolerated.   2. Continue DVT prophylaxis.   3. Mobilize as tolerated  4. AM labs     High level of risk due to:  severe exacerbation of chronic illness and illness with threat to life or bodily function.    Plan of care and goals reviewed during interdisciplinary rounds.  I discussed the patient's findings and my recommendations with patient and nursing staff      Conchita Meza,     Intensive Care Medicine and Pulmonary Medicine

## 2020-05-04 NOTE — PROGRESS NOTES
INFECTIOUS DISEASE CONSULT/INITIAL HOSPITAL VISIT    Marlene Aguirre  1955  [id number]    Date of Consult: 5/3/2020    Admission Date: 4/29/2020      Requesting Provider: Rishi Gill MD  Evaluating Physician: Braxton Horn MD    Reason for Consultation: COVID-19    History of present illness:    Patient is a 64 y.o. female who has intermittent asthma who has history of tobacco use and quit 3 years ago has multiple family members living with her.  Apparently patient developed headache, diarrhea, dry cough was given a Z-Jules by primary care physician on April 27,020 patient had progressive cough and fever and hypoxia on April 29.    Apparently a therapist that was coming to the house tested positive for COVID prior to patient's symptoms.    Patient admitted to the floor but because of worsening hypoxia and fevers was transferred to the intensive care unit.  Patient given 1 dose of 800 mg of Actemra last night    Patient is stable hemodynamically and on oxygen by nasal cannula currently her sats on 6 L are 91%    CT scan performed high probability for COVID-19 with bilateral groundglass opacities    COVID PCR positive    5/1/2020 patient received 2 units of convalescent serum plasma yesterday is received IV Lasix as well remains on 6 L with O2 sats between 88% and 90%.  Patient is a good spirits is sitting in chair is able to order food for nutrition per telephone call.  Per nursing staff had liquid bowel movements x3 yesterday is otherwise afebrile and hemodynamically stable    Review of systems remark for diarrhea no fevers    5/2/2020; patient required 8 L of oxygen by nasal cannula last night is otherwise hemodynamically stable.  Patient is eating small amounts reports that she ate for sherbet yesterday with the food otherwise states like cardboard.    Patient has intermittent cough which is productive.  She denies fevers rash sore throat ;    Patient was talked to by telephone and inspected  through the window.    5/3/2020; patient is doing fair he is on high flow nasal cannula now with better O2 sats but is on increasing amounts of oxygen.  Patient is afebrile and hemodynamically stable patient is being diuresed by pulmonary.  Does not report rash or sore throat patient spoke to over the telephone and visualized through the window  Past Medical History:   Diagnosis Date   • Asthma    • Atrial fibrillation (CMS/HCC)    • Coronary artery disease    • Hypertension        Past Surgical History:   Procedure Laterality Date   • BILATERAL BREAST REDUCTION     • BRAIN SURGERY     • DILATATION AND CURETTAGE     • PACEMAKER IMPLANTATION         History reviewed. No pertinent family history.    Social History     Socioeconomic History   • Marital status:      Spouse name: Not on file   • Number of children: Not on file   • Years of education: Not on file   • Highest education level: Not on file   Tobacco Use   • Smoking status: Former Smoker   • Smokeless tobacco: Never Used   • Tobacco comment: QUIT 3 YEARS AGO   Substance and Sexual Activity   • Alcohol use: Never     Frequency: Never   • Drug use: Never   • Sexual activity: Defer       Allergies   Allergen Reactions   • Contrast Dye Swelling     FACE, NOSE AND NECK SWELLING AS WELL AS SANDI    • Doxycycline Provider Review Needed     PT UNSURE OF REACTION    • Flexeril [Cyclobenzaprine] Hives   • Keflex [Cephalexin] Provider Review Needed     PT DOES NOT REMEMBER    • Kenalog [Triamcinolone Acetonide] Itching   • Penicillins Hives   • Sulfa Antibiotics Itching   • Tetracyclines & Related Provider Review Needed     PT UNSURE OF REACTION   • Levaquin [Levofloxacin] Rash         Medication:    Current Facility-Administered Medications:   •  acetaminophen (TYLENOL) tablet 650 mg, 650 mg, Oral, Q6H PRN, Kan Ng MD, 650 mg at 05/03/20 2114  •  albuterol sulfate HFA (PROVENTIL HFA;VENTOLIN HFA;PROAIR HFA) inhaler 2 puff, 2 puff, Inhalation, Q4H PRN,  Kan Ng MD, 2 puff at 20 1612  •  atenolol (TENORMIN) tablet 25 mg, 25 mg, Oral, Daily, Kan Ng MD, 25 mg at 20 0826  •  dilTIAZem CD (CARDIZEM CD) 24 hr capsule 240 mg, 240 mg, Oral, Daily, Kan Ng MD, 240 mg at 20 0826  •  diphenhydrAMINE (BENADRYL) capsule 50 mg, 50 mg, Oral, BID PRN, Kan Ng MD, 50 mg at 20 1607  •  enoxaparin (LOVENOX) syringe 120 mg, 1 mg/kg, Subcutaneous, Q12H, Kan Ng MD, 120 mg at 20 2114  •  magnesium sulfate 4 gram infusion - Mg less than or equal to 1mg/dL, 4 g, Intravenous, PRN **OR** magnesium sulfate 3 gram infusion (1gm x 3) - Mg 1.1 - 1.5 mg/dL, 1 g, Intravenous, PRN **OR** Magnesium Sulfate 2 gram infusion- Mg 1.6 - 1.9 mg/dL, 2 g, Intravenous, PRN, Kan Ng MD, Last Rate: 25 mL/hr at 20 0623, 2 g at 20 0623  •  potassium chloride (MICRO-K) CR capsule 40 mEq, 40 mEq, Oral, PRN, Kan Ng MD, 40 mEq at 20 1221  •  pravastatin (PRAVACHOL) tablet 40 mg, 40 mg, Oral, Nightly, Braxton Horn MD, 40 mg at 20 2114  •  sodium chloride 0.9 % flush 10 mL, 10 mL, Intravenous, PRN, Kan Ng MD  •  sodium chloride 0.9 % flush 10 mL, 10 mL, Intravenous, Q12H, Kan Ng MD, 10 mL at 20 0827  •  sodium chloride 0.9 % flush 10 mL, 10 mL, Intravenous, PRN, Kan Ng MD, 10 mL at 20 0001    Antibiotics:  Anti-Infectives (From admission, onward)    Ordered     Dose/Rate Route Frequency Start Stop    20 1943  vancomycin 2250 mg/500 mL 0.9% NS IVPB ([facility])     Ordering Provider:  Sondra Worrell RPH    20 mg/kg × 118 kg  over 150 Minutes Intravenous Once 20 0022    20  aztreonam (AZACTAM) 1 g in 50 mL NS IVPB     Ordering Provider:  Sondra Worrell RPH    1 g  over 30 Minutes Intravenous Once 20 0200            Review of Systems:  See HPI      Physical Exam:   Vital Signs  Temp (24hrs), Av.9 °F (36.6 °C),  Min:97.2 °F (36.2 °C), Max:98.2 °F (36.8 °C)    Temp  Min: 97.2 °F (36.2 °C)  Max: 98.2 °F (36.8 °C)  BP  Min: 111/60  Max: 129/67  Pulse  Min: 59  Max: 87  Resp  Min: 18  Max: 22  SpO2  Min: 86 %  Max: 96 %  Patient seen through window and talk to on the telephone  GENERAL: Awake and alert, pleasant in no distress on oxygen by nasal cannula  HEENT: Normocephalic, atraumatic.  EOMI no external oral lesions    HEART: Sinus rhythm by telemetry  LUNGS: Bilateral chest wall expansion no respiratory distress  ABDOMEN: Bowel sounds were present.  No obvious abdominal distention on exam    MSK: Wheeze with hands    NEURO: Oriented to PPT.  Able to have an appropriate conversation  PSYCHIATRIC:  Normal affect    Laboratory Data    Results from last 7 days   Lab Units 05/03/20  0626 05/02/20  0346 05/01/20  0343   WBC 10*3/mm3 5.60 2.54* 3.80   HEMOGLOBIN g/dL 13.6 13.5 13.9   HEMATOCRIT % 43.5 44.1 44.9   PLATELETS 10*3/mm3 292 253 227     Results from last 7 days   Lab Units 05/03/20  0627   SODIUM mmol/L 137   POTASSIUM mmol/L 4.5   CHLORIDE mmol/L 97*   CO2 mmol/L 27.0   BUN mg/dL 28*   CREATININE mg/dL 0.73   GLUCOSE mg/dL 163*   CALCIUM mg/dL 8.7     Results from last 7 days   Lab Units 05/01/20  0343   ALK PHOS U/L 62   BILIRUBIN mg/dL 0.3   ALT (SGPT) U/L 11   AST (SGOT) U/L 34*         Results from last 7 days   Lab Units 05/02/20  0346   CRP mg/dL 3.99*     Results from last 7 days   Lab Units 04/29/20  1511   LACTATE mmol/L 1.6             Estimated Creatinine Clearance: 95.1 mL/min (by C-G formula based on SCr of 0.73 mg/dL).      Microbiology:  No results found for: ACANTHNAEG, AFBCX, BPERTUSSISCX, BLOODCX  No results found for: BCIDPCR, CXREFLEX, CSFCX, CULTURETIS  No results found for: CULTURES, HSVCX, URCX  No results found for: EYECULTURE, GCCX, LABHSV  No results found for: LEGIONELLA, MRSACX, MUMPSCX, MYCOPLASCX  No results found for: NOCARDIACX, STOOLCX  No results found for: THROATCX, UNSTIMCULT,  URINECX, CULTURE, VZVCULTUR  No results found for: VIRALCULTU, WOUNDCX        Radiology:  Imaging Results (Last 72 Hours)     Procedure Component Value Units Date/Time    XR Chest 1 View [[id number]] Collected:  05/01/20 0842     Updated:  05/01/20 2244    Narrative:       EXAMINATION: XR CHEST 1 VW-     INDICATION: Resp Distress; J96.01-Acute respiratory failure with  hypoxia; U07.1-COVID-19     COMPARISON: 04/29/2020     FINDINGS: Patchy disease in the right upper lung has moderately  increased. There is mildly increased interstitial change in the left  perihilar region and right base. No dense lung consolidation effusion or  pneumothorax is seen. The heart is mildly enlarged. Vasculature is  slightly cephalized.          Impression:       Increased patchy bilateral interstitial disease, greater in  the right upper lung than elsewhere.         This report was finalized on 5/1/2020 10:41 PM by Dr. oJse Martell MD.               Impression:   CO VID 19 pneumonitis  Hypoxia  Acute respiratory failure  Probable cytokine storm manifested by elevated ferritin elevated LDH, hypoxia  Penicillin allergy; questionable throat swelling  Doxycycline intolerance    Patient has received Actemra on 4/29  Received 2 units of serum convalescent plasma therapy on 4/30    PLAN/RECOMMENDATIONS:   Thank you for asking us to see Marlene Aguirre, I recommend the following:    Continue supportive care agree with diuresis  Agree with Lovenox for DVT prophylaxis  Continue pravastatin for cardioprotection        Watch patient for clinical deterioration in severe cases of cytokine storm patient may require more than 1 dose of Actemra.    IL-6 level is 130 on 4/30/2020 prior to dose of Actemra    5/2/2020 ferritin 889, d-dimer 0.71 , CRP 3.99; except for the CRP the LDH and ferritin are higher than on 4/30/2020      Hopefully oxygenation will continue to improve    Continue supplemental oxygen; options at this point are repeating an  additional dose of Actemra   Agree with one-time dose of steroids on 5/1/2020 10 mg of dexamethasone    Complicated with patient's hypoxia is yet to improve.    Options at this point are additional dose of Actemra versus steroids.    Patient is hoping to avoid mechanical ventilation.    Repeat ferritin, LDH, CRP tomorrow if these are still uptrending will probably offer additional dose of Actemra    Patient is critically ill  Braxton Horn MD  5/3/2020  22:44

## 2020-05-04 NOTE — PROGRESS NOTES
Continued Stay Note  [facility]     Patient Name: Marlene Aguirre  MRN: [id number]  Today's Date: 5/4/2020    Admit Date: 4/29/2020    Discharge Plan     Row Name 05/04/20 1445       Plan    Plan  Home    Plan Comments  Patient remains in isolation.  Discussed in MDR.  Try to wean off of HFNC.  CM will continue to follow.        Discharge Codes    No documentation.       Expected Discharge Date and Time     Expected Discharge Date Expected Discharge Time    May 11, 2020             Rolanda Lujan RN

## 2020-05-04 NOTE — PLAN OF CARE
Problem: Patient Care Overview  Goal: Plan of Care Review  Outcome: Ongoing (interventions implemented as appropriate)  Flowsheets  Taken 5/4/2020 1820 by Meena Joshi, RN  Progress: no change  Outcome Summary: Pt tolerating 40% 30L HFNC. Up in chair for shift. VSS.  Taken 5/4/2020 1600 by Mohit Espana, RN  Plan of Care Reviewed With: patient

## 2020-05-04 NOTE — PLAN OF CARE
Problem: Patient Care Overview  Goal: Plan of Care Review  Outcome: Ongoing (interventions implemented as appropriate)  Flowsheets  Taken 5/4/2020 0643  Progress: improving  Taken 5/4/2020 0600  Plan of Care Reviewed With: patient  Note:   VSS. Afebrile. Pt weaned to 45% FiO2 and 30L on HFNC and maintaining sat >90%. UOP adequate. Tylenol given for headache. LLE tenderness improved. Will continue to monitor.

## 2020-05-04 NOTE — PAYOR COMM NOTE
"Makenna Phan, RN Utilization Review [phone]  Fax # [phone]  Ref # [id number]    Continued stay    Gurmeet Aguirre (64 y.o. Female)     Date of Birth Social Security Number Address Home Phone MRN    1955  [address] [place] [facility] [phone] [id number]    Mu-ism Marital Status          Jainism        Admission Date Admission Type Admitting Provider Attending Provider Department, Room/Bed    4/29/20 Emergency Kan Ng MD McIntosh, Matthew M, MD [facility] 2A ICU, N219/1    Discharge Date Discharge Disposition Discharge Destination                       Attending Provider:  Rickie Luna MD    Allergies:  Contrast Dye, Doxycycline, Flexeril [Cyclobenzaprine], Keflex [Cephalexin], Kenalog [Triamcinolone Acetonide], Penicillins, Sulfa Antibiotics, Tetracyclines & Related, Levaquin [Levofloxacin]    Isolation:  Contact Air   Infection:  COVID (confirmed) (04/30/20)   Code Status:  CPR    Ht:  160 cm (63\")   Wt:  111 kg (244 lb 4.3 oz)    Admission Cmt:  None   Principal Problem:  COVID-19 Pneumonitis [U07.1,J12.89]                 Active Insurance as of 4/29/2020     Primary Coverage     Payor Plan Insurance Group Employer/Plan Group    [place] [id number]     Payor Plan Address Payor Plan Phone Number Payor Plan Fax Number Effective Dates    [employer] Claims Office - [address] [address] [phone]  1/1/2020 - None Entered    [facility] [id number]       Subscriber Name Subscriber Birth Date Member ID       GURMEET AGUIRRE 1955 [id number]                 Emergency Contacts      (Rel.) Home Phone Work Phone Mobile Phone    TJAISHWARYA (Son) -- -- [phone]    FEIMADHU DE LA ROSA (Daughter) -- -- [phone]            Operative/Procedure Notes (last 24 hours) (Notes from 05/03/20 0902 through 05/04/20 0902)    No notes of this type exist for this encounter.            Physician Progress " Notes (last 24 hours) (Notes from 05/03/20 0902 through 05/04/20 0902)      Braxton Horn MD at 05/03/20 2244          INFECTIOUS DISEASE CONSULT/INITIAL HOSPITAL VISIT    Marlene Aguirre  1955  [id number]    Date of Consult: 5/3/2020    Admission Date: 4/29/2020      Requesting Provider: Rishi Gill MD  Evaluating Physician: Braxton Horn MD    Reason for Consultation: COVID-19    History of present illness:    Patient is a 64 y.o. female who has intermittent asthma who has history of tobacco use and quit 3 years ago has multiple family members living with her.  Apparently patient developed headache, diarrhea, dry cough was given a Z-Jules by primary care physician on April 27,020 patient had progressive cough and fever and hypoxia on April 29.    Apparently a therapist that was coming to the house tested positive for COVID prior to patient's symptoms.    Patient admitted to the floor but because of worsening hypoxia and fevers was transferred to the intensive care unit.  Patient given 1 dose of 800 mg of Actemra last night    Patient is stable hemodynamically and on oxygen by nasal cannula currently her sats on 6 L are 91%    CT scan performed high probability for COVID-19 with bilateral groundglass opacities    COVID PCR positive    5/1/2020 patient received 2 units of convalescent serum plasma yesterday is received IV Lasix as well remains on 6 L with O2 sats between 88% and 90%.  Patient is a good spirits is sitting in chair is able to order food for nutrition per telephone call.  Per nursing staff had liquid bowel movements x3 yesterday is otherwise afebrile and hemodynamically stable    Review of systems remark for diarrhea no fevers    5/2/2020; patient required 8 L of oxygen by nasal cannula last night is otherwise hemodynamically stable.  Patient is eating small amounts reports that she ate for sherbet yesterday with the food otherwise states like cardboard.    Patient has  intermittent cough which is productive.  She denies fevers rash sore throat ;    Patient was talked to by telephone and inspected through the window.    5/3/2020; patient is doing fair he is on high flow nasal cannula now with better O2 sats but is on increasing amounts of oxygen.  Patient is afebrile and hemodynamically stable patient is being diuresed by pulmonary.  Does not report rash or sore throat patient spoke to over the telephone and visualized through the window  Past Medical History:   Diagnosis Date   • Asthma    • Atrial fibrillation (CMS/HCC)    • Coronary artery disease    • Hypertension        Past Surgical History:   Procedure Laterality Date   • BILATERAL BREAST REDUCTION     • BRAIN SURGERY     • DILATATION AND CURETTAGE     • PACEMAKER IMPLANTATION         History reviewed. No pertinent family history.    Social History     Socioeconomic History   • Marital status:      Spouse name: Not on file   • Number of children: Not on file   • Years of education: Not on file   • Highest education level: Not on file   Tobacco Use   • Smoking status: Former Smoker   • Smokeless tobacco: Never Used   • Tobacco comment: QUIT 3 YEARS AGO   Substance and Sexual Activity   • Alcohol use: Never     Frequency: Never   • Drug use: Never   • Sexual activity: Defer       Allergies   Allergen Reactions   • Contrast Dye Swelling     FACE, NOSE AND NECK SWELLING AS WELL AS SANDI    • Doxycycline Provider Review Needed     PT UNSURE OF REACTION    • Flexeril [Cyclobenzaprine] Hives   • Keflex [Cephalexin] Provider Review Needed     PT DOES NOT REMEMBER    • Kenalog [Triamcinolone Acetonide] Itching   • Penicillins Hives   • Sulfa Antibiotics Itching   • Tetracyclines & Related Provider Review Needed     PT UNSURE OF REACTION   • Levaquin [Levofloxacin] Rash         Medication:    Current Facility-Administered Medications:   •  acetaminophen (TYLENOL) tablet 650 mg, 650 mg, Oral, Q6H PRN, Kan Ng MD, 650 mg at  05/03/20 2114  •  albuterol sulfate HFA (PROVENTIL HFA;VENTOLIN HFA;PROAIR HFA) inhaler 2 puff, 2 puff, Inhalation, Q4H PRN, Kan Ng MD, 2 puff at 04/30/20 1612  •  atenolol (TENORMIN) tablet 25 mg, 25 mg, Oral, Daily, Kan Ng MD, 25 mg at 05/03/20 0826  •  dilTIAZem CD (CARDIZEM CD) 24 hr capsule 240 mg, 240 mg, Oral, Daily, Kan Ng MD, 240 mg at 05/03/20 0826  •  diphenhydrAMINE (BENADRYL) capsule 50 mg, 50 mg, Oral, BID PRN, Kan Ng MD, 50 mg at 05/02/20 1607  •  enoxaparin (LOVENOX) syringe 120 mg, 1 mg/kg, Subcutaneous, Q12H, Kan Ng MD, 120 mg at 05/03/20 2114  •  magnesium sulfate 4 gram infusion - Mg less than or equal to 1mg/dL, 4 g, Intravenous, PRN **OR** magnesium sulfate 3 gram infusion (1gm x 3) - Mg 1.1 - 1.5 mg/dL, 1 g, Intravenous, PRN **OR** Magnesium Sulfate 2 gram infusion- Mg 1.6 - 1.9 mg/dL, 2 g, Intravenous, PRN, Kan Ng MD, Last Rate: 25 mL/hr at 05/01/20 0623, 2 g at 05/01/20 0623  •  potassium chloride (MICRO-K) CR capsule 40 mEq, 40 mEq, Oral, PRN, Kan Ng MD, 40 mEq at 04/30/20 1221  •  pravastatin (PRAVACHOL) tablet 40 mg, 40 mg, Oral, Nightly, Braxton Horn MD, 40 mg at 05/03/20 2114  •  sodium chloride 0.9 % flush 10 mL, 10 mL, Intravenous, PRN, Kan Ng MD  •  sodium chloride 0.9 % flush 10 mL, 10 mL, Intravenous, Q12H, Kan Ng MD, 10 mL at 05/03/20 0827  •  sodium chloride 0.9 % flush 10 mL, 10 mL, Intravenous, PRN, Kan Ng MD, 10 mL at 05/01/20 0001    Antibiotics:  Anti-Infectives (From admission, onward)    Ordered     Dose/Rate Route Frequency Start Stop    04/29/20 1943  vancomycin 2250 mg/500 mL 0.9% NS IVPB ([facility])     Ordering Provider:  Sondra Worrell RPH    20 mg/kg × 118 kg  over 150 Minutes Intravenous Once 04/29/20 2030 04/30/20 0022    04/29/20 1943  aztreonam (AZACTAM) 1 g in 50 mL NS IVPB     Ordering Provider:  Sondra Worrell RPH    1 g  over 30 Minutes Intravenous Once 04/29/20 2030  20 0200            Review of Systems:  See HPI      Physical Exam:   Vital Signs  Temp (24hrs), Av.9 °F (36.6 °C), Min:97.2 °F (36.2 °C), Max:98.2 °F (36.8 °C)    Temp  Min: 97.2 °F (36.2 °C)  Max: 98.2 °F (36.8 °C)  BP  Min: 111/60  Max: 129/67  Pulse  Min: 59  Max: 87  Resp  Min: 18  Max: 22  SpO2  Min: 86 %  Max: 96 %  Patient seen through window and talk to on the telephone  GENERAL: Awake and alert, pleasant in no distress on oxygen by nasal cannula  HEENT: Normocephalic, atraumatic.  EOMI no external oral lesions    HEART: Sinus rhythm by telemetry  LUNGS: Bilateral chest wall expansion no respiratory distress  ABDOMEN: Bowel sounds were present.  No obvious abdominal distention on exam    MSK: Wheeze with hands    NEURO: Oriented to PPT.  Able to have an appropriate conversation  PSYCHIATRIC:  Normal affect    Laboratory Data    Results from last 7 days   Lab Units 20  0626 20  0346 20  0343   WBC 10*3/mm3 5.60 2.54* 3.80   HEMOGLOBIN g/dL 13.6 13.5 13.9   HEMATOCRIT % 43.5 44.1 44.9   PLATELETS 10*3/mm3 292 253 227     Results from last 7 days   Lab Units 20  0627   SODIUM mmol/L 137   POTASSIUM mmol/L 4.5   CHLORIDE mmol/L 97*   CO2 mmol/L 27.0   BUN mg/dL 28*   CREATININE mg/dL 0.73   GLUCOSE mg/dL 163*   CALCIUM mg/dL 8.7     Results from last 7 days   Lab Units 20  0343   ALK PHOS U/L 62   BILIRUBIN mg/dL 0.3   ALT (SGPT) U/L 11   AST (SGOT) U/L 34*         Results from last 7 days   Lab Units 20  0346   CRP mg/dL 3.99*     Results from last 7 days   Lab Units 20  1511   LACTATE mmol/L 1.6             Estimated Creatinine Clearance: 95.1 mL/min (by C-G formula based on SCr of 0.73 mg/dL).      Microbiology:  No results found for: ACANTHNAEG, AFBCX, BPERTUSSISCX, BLOODCX  No results found for: BCIDPCR, CXREFLEX, CSFCX, CULTURETIS  No results found for: CULTURES, HSVCX, URCX  No results found for: EYECULTURE, GCCX, LABHSV  No results found for:  LEGIONELLA, MRSACX, MUMPSCX, MYCOPLASCX  No results found for: NOCARDIACX, STOOLCX  No results found for: THROATCX, UNSTIMCULT, URINECX, CULTURE, VZVCULTUR  No results found for: VIRALCULTU, WOUNDCX        Radiology:  Imaging Results (Last 72 Hours)     Procedure Component Value Units Date/Time    XR Chest 1 View [[id number]] Collected:  05/01/20 0842     Updated:  05/01/20 2244    Narrative:       EXAMINATION: XR CHEST 1 VW-     INDICATION: Resp Distress; J96.01-Acute respiratory failure with  hypoxia; U07.1-COVID-19     COMPARISON: 04/29/2020     FINDINGS: Patchy disease in the right upper lung has moderately  increased. There is mildly increased interstitial change in the left  perihilar region and right base. No dense lung consolidation effusion or  pneumothorax is seen. The heart is mildly enlarged. Vasculature is  slightly cephalized.          Impression:       Increased patchy bilateral interstitial disease, greater in  the right upper lung than elsewhere.         This report was finalized on 5/1/2020 10:41 PM by Dr. Jose Mratell MD.               Impression:   CO VID 19 pneumonitis  Hypoxia  Acute respiratory failure  Probable cytokine storm manifested by elevated ferritin elevated LDH, hypoxia  Penicillin allergy; questionable throat swelling  Doxycycline intolerance    Patient has received Actemra on 4/29  Received 2 units of serum convalescent plasma therapy on 4/30    PLAN/RECOMMENDATIONS:   Thank you for asking us to see Marlene Aguirre, I recommend the following:    Continue supportive care agree with diuresis  Agree with Lovenox for DVT prophylaxis  Continue pravastatin for cardioprotection        Watch patient for clinical deterioration in severe cases of cytokine storm patient may require more than 1 dose of Actemra.    IL-6 level is 130 on 4/30/2020 prior to dose of Actemra    5/2/2020 ferritin 889, d-dimer 0.71 , CRP 3.99; except for the CRP the LDH and ferritin are higher than on  4/30/2020      Hopefully oxygenation will continue to improve    Continue supplemental oxygen; options at this point are repeating an additional dose of Actemra   Agree with one-time dose of steroids on 5/1/2020 10 mg of dexamethasone    Complicated with patient's hypoxia is yet to improve.    Options at this point are additional dose of Actemra versus steroids.    Patient is hoping to avoid mechanical ventilation.    Repeat ferritin, LDH, CRP tomorrow if these are still uptrending will probably offer additional dose of Actemra    Patient is critically ill  Braxton Horn MD  5/3/2020  22:44                    Electronically signed by Braxton Horn MD at 05/03/20 7044     Rickie Luna MD at 05/03/20 1039          Intensive Care Follow-up     Hospital:  LOS: 4 days   Ms. Marlene Aguirre, 64 y.o. female is followed for:   Pneumonia due to COVID-19 virus     Subjective   Subjective   Interval History:  The chart has been reviewed.  The patient required elevation to high flow nasal cannula oxygen last evening.  She states that she still feels short of breath although is feeling a bit better with a higher flow.  She has minimal cough.  She has remained afebrile.  Hemodynamics have remained stable.    The patient's past medical, surgical and social history were reviewed and updated in Epic as appropriate.     Objective   Objective     Infusions:     Medications:    atenolol 25 mg Oral Daily   bumetanide 2 mg Intravenous Once   dilTIAZem  mg Oral Daily   enoxaparin 1 mg/kg Subcutaneous Q12H   pravastatin 40 mg Oral Nightly   sodium chloride 10 mL Intravenous Q12H       Vital Sign Min/Max for last 24 hours  Temp  Min: 97.9 °F (36.6 °C)  Max: 98.5 °F (36.9 °C)   BP  Min: 105/57  Max: 132/60   Pulse  Min: 59  Max: 87   Resp  Min: 16  Max: 22   SpO2  Min: 83 %  Max: 93 %   Flow (L/min)  Min: 8  Max: 30       Input/Output for last 24 hour shift  05/02 0701 - 05/03 0700  In: 110 [P.O.:60]  Out: 725  "[Urine:725]      Objective:  General Appearance:  Ill-appearing, in no acute distress, not in pain and uncomfortable.    Vital signs: (most recent): Blood pressure 116/70, pulse 63, temperature 97.9 °F (36.6 °C), resp. rate 20, height 160 cm (63\"), weight 115 kg (253 lb 8.5 oz), SpO2 91 %.    HEENT: Normal HEENT exam.  (High flow nasal cannula oxygen)    Lungs:  Normal effort and normal respiratory rate.  Breath sounds clear to auscultation.  No rales, decreased breath sounds, wheezes or rhonchi.    Heart: Normal rate.  Regular rhythm.  S1 normal and S2 normal.  No murmur or friction rub.   Chest: Symmetric chest wall expansion.   Abdomen: Abdomen is soft and non-distended.  Bowel sounds are normal.   There is no abdominal tenderness.     Extremities: (Minimal bilateral lower extremity edema.  No discoloration of the left ankle.  Mildly tender to palpation.)  Neurological: Patient is alert and oriented to person, place and time.  Normal strength.    Pupils:  Pupils are equal, round, and reactive to light.  Pupils are equal.   Skin:  Warm.  No rash or cyanosis.             Results from last 7 days   Lab Units 05/03/20  0626 05/02/20  0346 05/01/20  0343   WBC 10*3/mm3 5.60 2.54* 3.80   HEMOGLOBIN g/dL 13.6 13.5 13.9   PLATELETS 10*3/mm3 292 253 227     Results from last 7 days   Lab Units 05/03/20  0627 05/02/20  0346 05/01/20  0343  04/30/20  0309   SODIUM mmol/L 137 138 139  --  135*   POTASSIUM mmol/L 4.5 4.5 4.2   < > 3.4*   CO2 mmol/L 27.0 26.0 27.0  --  22.0   BUN mg/dL 28* 21 16  --  14   CREATININE mg/dL 0.73 0.83 0.82  --  0.73   MAGNESIUM mg/dL  --  2.2 1.9  --  1.8   PHOSPHORUS mg/dL  --   --  2.7  --   --    GLUCOSE mg/dL 163* 155* 108*  --  144*    < > = values in this interval not displayed.     Estimated Creatinine Clearance: 95.1 mL/min (by C-G formula based on SCr of 0.73 mg/dL).    Results from last 7 days   Lab Units 04/29/20  1638   PH, ARTERIAL pH units 7.420   PCO2, ARTERIAL mm Hg 41.5   PO2 ART " mm Hg 68.3*       I reviewed the patient's results and images.     Assessment/Plan   Impression        COVID-19 Pneumonitis    Acute hypoxemic respiratory failure (CMS/HCC)    Fever    Paroxysmal atrial fibrillation (CMS/HCC)    Obesity (BMI 30-39.9)       Plan        I would like to go ahead and further diurese the patient with 2 mg of IV Bumex.  That said, she does not have much in the way of rales on her examination and I suspect that the majority of her respiratory failure is secondary to pneumonitis from the COVID-19 infection.  Continue with oxygen support.  We will mobilize as tolerated.  Continue current DVT prophylaxis.  She does remain at high risk for worsening secondary to respiratory failure which seems to be worsening.  Maintain isolation.  Chest x-ray has been ordered for tomorrow morning.    Plan of care and goals reviewed with mulitdisciplinary/antibiotic stewardship team during rounds.   I discussed the patient's findings and my recommendations with patient and nursing staff     High level of risk due to:  severe exacerbation of chronic illness and illness with threat to life or bodily function.      Rickie Luna MD, [facility]  Pulmonology and Critical Care Medicine       Electronically signed by Rickie Luna MD at 05/03/20 1042       Consult Notes (last 24 hours) (Notes from 05/03/20 0902 through 05/04/20 0902)    No notes of this type exist for this encounter.

## 2020-05-04 NOTE — PROGRESS NOTES
INFECTIOUS DISEASE CONSULT/INITIAL HOSPITAL VISIT    Marlene Aguirre  1955  [id number]    Date of Consult: 5/4/2020    Admission Date: 4/29/2020      Requesting Provider: Rishi Gill MD  Evaluating Physician: Braxton Horn MD    Reason for Consultation: COVID-19    History of present illness:    Patient is a 64 y.o. female who has intermittent asthma who has history of tobacco use and quit 3 years ago has multiple family members living with her.  Apparently patient developed headache, diarrhea, dry cough was given a Z-Jules by primary care physician on April 27,020 patient had progressive cough and fever and hypoxia on April 29.    Apparently a therapist that was coming to the house tested positive for COVID prior to patient's symptoms.    Patient admitted to the floor but because of worsening hypoxia and fevers was transferred to the intensive care unit.  Patient given 1 dose of 800 mg of Actemra last night    Patient is stable hemodynamically and on oxygen by nasal cannula currently her sats on 6 L are 91%    CT scan performed high probability for COVID-19 with bilateral groundglass opacities    COVID PCR positive    5/1/2020 patient received 2 units of convalescent serum plasma yesterday is received IV Lasix as well remains on 6 L with O2 sats between 88% and 90%.  Patient is a good spirits is sitting in chair is able to order food for nutrition per telephone call.  Per nursing staff had liquid bowel movements x3 yesterday is otherwise afebrile and hemodynamically stable    Review of systems remark for diarrhea no fevers    5/2/2020; patient required 8 L of oxygen by nasal cannula last night is otherwise hemodynamically stable.  Patient is eating small amounts reports that she ate for sherbet yesterday with the food otherwise states like cardboard.    Patient has intermittent cough which is productive.  She denies fevers rash sore throat ;    Patient was talked to by telephone and inspected  through the window.    5/3/2020; patient is doing fair he is on high flow nasal cannula now with better O2 sats but is on increasing amounts of oxygen.  Patient is afebrile and hemodynamically stable patient is being diuresed by pulmonary.  Does not report rash or sore throat patient spoke to over the telephone and visualized through the window    5/4/20; doing well; reports nose is stopped up. no events overnight; afebrile, hemodynamically stable; on high flow nasal cannula this is being weaned; denies chest pain, but does have intermittent coughing. Eating better.   Patient inspected from window and talked to by telephone  Past Medical History:   Diagnosis Date   • Asthma    • Atrial fibrillation (CMS/HCC)    • Coronary artery disease    • Hypertension        Past Surgical History:   Procedure Laterality Date   • BILATERAL BREAST REDUCTION     • BRAIN SURGERY     • DILATATION AND CURETTAGE     • PACEMAKER IMPLANTATION         History reviewed. No pertinent family history.    Social History     Socioeconomic History   • Marital status:      Spouse name: Not on file   • Number of children: Not on file   • Years of education: Not on file   • Highest education level: Not on file   Tobacco Use   • Smoking status: Former Smoker   • Smokeless tobacco: Never Used   • Tobacco comment: QUIT 3 YEARS AGO   Substance and Sexual Activity   • Alcohol use: Never     Frequency: Never   • Drug use: Never   • Sexual activity: Defer       Allergies   Allergen Reactions   • Contrast Dye Swelling     FACE, NOSE AND NECK SWELLING AS WELL AS SANDI    • Doxycycline Provider Review Needed     PT UNSURE OF REACTION    • Flexeril [Cyclobenzaprine] Hives   • Keflex [Cephalexin] Provider Review Needed     PT DOES NOT REMEMBER    • Kenalog [Triamcinolone Acetonide] Itching   • Penicillins Hives   • Sulfa Antibiotics Itching   • Tetracyclines & Related Provider Review Needed     PT UNSURE OF REACTION   • Levaquin [Levofloxacin] Rash           Medication:    Current Facility-Administered Medications:   •  acetaminophen (TYLENOL) tablet 650 mg, 650 mg, Oral, Q6H PRN, Kan Ng MD, 650 mg at 05/04/20 1132  •  albuterol sulfate HFA (PROVENTIL HFA;VENTOLIN HFA;PROAIR HFA) inhaler 2 puff, 2 puff, Inhalation, Q4H PRN, Kan Ng MD, 2 puff at 04/30/20 1612  •  atenolol (TENORMIN) tablet 25 mg, 25 mg, Oral, Daily, Kan Ng MD, 25 mg at 05/04/20 0847  •  dilTIAZem CD (CARDIZEM CD) 24 hr capsule 240 mg, 240 mg, Oral, Daily, Kan Ng MD, 240 mg at 05/04/20 0847  •  diphenhydrAMINE (BENADRYL) capsule 50 mg, 50 mg, Oral, BID PRN, Kan Ng MD, 50 mg at 05/02/20 1607  •  enoxaparin (LOVENOX) syringe 120 mg, 1 mg/kg, Subcutaneous, Q12H, Kan Ng MD, 120 mg at 05/04/20 0847  •  magnesium sulfate 4 gram infusion - Mg less than or equal to 1mg/dL, 4 g, Intravenous, PRN **OR** magnesium sulfate 3 gram infusion (1gm x 3) - Mg 1.1 - 1.5 mg/dL, 1 g, Intravenous, PRN **OR** Magnesium Sulfate 2 gram infusion- Mg 1.6 - 1.9 mg/dL, 2 g, Intravenous, PRN, Kan Ng MD, Last Rate: 25 mL/hr at 05/01/20 0623, 2 g at 05/01/20 0623  •  potassium chloride (MICRO-K) CR capsule 40 mEq, 40 mEq, Oral, PRN, Kan Ng MD, 40 mEq at 04/30/20 1221  •  pravastatin (PRAVACHOL) tablet 40 mg, 40 mg, Oral, Nightly, Braxton Horn MD, 40 mg at 05/03/20 2114  •  sodium chloride 0.9 % flush 10 mL, 10 mL, Intravenous, PRN, Kan Ng MD  •  sodium chloride 0.9 % flush 10 mL, 10 mL, Intravenous, Q12H, Kan Ng MD, 10 mL at 05/04/20 0848  •  sodium chloride 0.9 % flush 10 mL, 10 mL, Intravenous, PRN, Kan Ng MD, 10 mL at 05/01/20 0001  •  sodium chloride nasal spray 2 spray, 2 spray, Each Nare, PRN, Sondra Lundy, APRN, 2 spray at 05/04/20 0015    Antibiotics:  Anti-Infectives (From admission, onward)    Ordered     Dose/Rate Route Frequency Start Stop    04/29/20 1943  vancomycin 2250 mg/500 mL 0.9% NS IVPB ([facility])     Ordering  Provider:  Sondra Worrell RPH    20 mg/kg × 118 kg  over 150 Minutes Intravenous Once 20 0022    20 1943  aztreonam (AZACTAM) 1 g in 50 mL NS IVPB     Ordering Provider:  Sondra Worrell RPH    1 g  over 30 Minutes Intravenous Once 20 0200            Review of Systems:  See HPI      Physical Exam:   Vital Signs  Temp (24hrs), Av.5 °F (36.4 °C), Min:96.9 °F (36.1 °C), Max:98 °F (36.7 °C)    Temp  Min: 96.9 °F (36.1 °C)  Max: 98 °F (36.7 °C)  BP  Min: 103/57  Max: 132/69  Pulse  Min: 59  Max: 70  Resp  Min: 16  Max: 24  SpO2  Min: 90 %  Max: 96 %  Patient seen through window and talk to on the telephone  GENERAL: Awake and alert, pleasant in no distress on oxygen by nasal cannula  HEENT: Normocephalic, atraumatic.  EOMI no external oral lesions    HEART: Sinus rhythm by telemetry  LUNGS: Bilateral chest wall expansion no respiratory distress  ABDOMEN: Bowel sounds were present.  No obvious abdominal distention on exam    MSK: Wheeze with hands    NEURO: Oriented to PPT.  Able to have an appropriate conversation  PSYCHIATRIC:  Normal affect    Laboratory Data    Results from last 7 days   Lab Units 20  0408 20  0626 20  0346   WBC 10*3/mm3 7.40 5.60 2.54*   HEMOGLOBIN g/dL 13.4 13.6 13.5   HEMATOCRIT % 41.0 43.5 44.1   PLATELETS 10*3/mm3 317 292 253     Results from last 7 days   Lab Units 20  0408   SODIUM mmol/L 137   POTASSIUM mmol/L 3.9   CHLORIDE mmol/L 96*   CO2 mmol/L 28.0   BUN mg/dL 28*   CREATININE mg/dL 0.72   GLUCOSE mg/dL 141*   CALCIUM mg/dL 8.4*     Results from last 7 days   Lab Units 20  0343   ALK PHOS U/L 62   BILIRUBIN mg/dL 0.3   ALT (SGPT) U/L 11   AST (SGOT) U/L 34*         Results from last 7 days   Lab Units 20  0408   CRP mg/dL 0.66*     Results from last 7 days   Lab Units 20  1511   LACTATE mmol/L 1.6             Estimated Creatinine Clearance: 94.5 mL/min (by C-G formula based on SCr of 0.72  mg/dL).      Microbiology:  No results found for: ACANTHNAEG, AFBCX, BPERTUSSISCX, BLOODCX  No results found for: BCIDPCR, CXREFLEX, CSFCX, CULTURETIS  No results found for: CULTURES, HSVCX, URCX  No results found for: EYECULTURE, GCCX, LABHSV  No results found for: LEGIONELLA, MRSACX, MUMPSCX, MYCOPLASCX  No results found for: NOCARDIACX, STOOLCX  No results found for: THROATCX, UNSTIMCULT, URINECX, CULTURE, VZVCULTUR  No results found for: VIRALCULTU, WOUNDCX        Radiology:  Imaging Results (Last 72 Hours)     Procedure Component Value Units Date/Time    XR Chest 1 View [[id number]] Collected:  05/04/20 0818     Updated:  05/04/20 0952    Narrative:       EXAMINATION: XR CHEST 1 VW- 05/04/2020     INDICATION: J96.01-Acute respiratory failure with hypoxia;  U07.1-COVID-19      COMPARISON: 05/01/2020     FINDINGS: Portable chest reveals patchy airspace disease seen within the  right upper lobe which is stable and unchanged. The heart is enlarged.  Increased markings seen within left mid and lower lung field.  Degenerative changes seen within the spine. Cardiac pacer leads in  satisfactory position.           Impression:       Some improvement seen in aeration of the right upper and  left lower lung field. The heart is enlarged. Pacer leads remain in  satisfactory position.     D:  05/04/2020  E:  05/04/2020     This report was finalized on 5/4/2020 9:49 AM by Dr. Suzy Muhammad MD.       XR Chest 1 View [[id number]] Collected:  05/01/20 0842     Updated:  05/01/20 2244    Narrative:       EXAMINATION: XR CHEST 1 VW-     INDICATION: Resp Distress; J96.01-Acute respiratory failure with  hypoxia; U07.1-COVID-19     COMPARISON: 04/29/2020     FINDINGS: Patchy disease in the right upper lung has moderately  increased. There is mildly increased interstitial change in the left  perihilar region and right base. No dense lung consolidation effusion or  pneumothorax is seen. The heart is mildly enlarged. Vasculature  is  slightly cephalized.          Impression:       Increased patchy bilateral interstitial disease, greater in  the right upper lung than elsewhere.         This report was finalized on 5/1/2020 10:41 PM by Dr. Jose Martell MD.               Impression:   CO VID 19 pneumonitis  Hypoxia  Acute respiratory failure  Probable cytokine storm manifested by elevated ferritin elevated LDH, hypoxia  Penicillin allergy; questionable throat swelling  Doxycycline intolerance    Patient has received Actemra on 4/29  Received 2 units of serum convalescent plasma therapy on 4/30    PLAN/RECOMMENDATIONS:   Thank you for asking us to see Marlene Aguirre, I recommend the following:    Continue supportive care agree with diuresis  Agree with Lovenox for DVT prophylaxis  Continue pravastatin for cardioprotection        Watch patient for clinical deterioration in severe cases of cytokine storm patient may require more than 1 dose of Actemra.    IL-6 level is 130 on 4/30/2020 prior to dose of Actemra    5/4/20 ferritin down to ~ 850 ; patient may be improving;    Consider proning;    Watch oxygenation    CXR from 5/4/20 looks somewhat better;    Continue supportive care      D/w pulmonary   Patient is critically ill  Braxton Horn MD  5/4/2020  12:05

## 2020-05-04 NOTE — PROGRESS NOTES
Clinical Nutrition     Reason for Visit: MDR, Follow-up protocol, LOS    Patient Name: Malrene Aguirre  YOB: 1955  MRN: [id number]  Date of Encounter: 05/04/20 12:40  Admission date: 4/29/2020    Nutrition Assessment   Admission Problem List:    ARF/ hypoxia  + Covid 19 infection  PNA  s/p convalescent plasma therapy 4-30-20      PMH:   She  has a past medical history of Asthma, Atrial fibrillation (CMS/HCC), Coronary artery disease, and Hypertension.  PSxH:  She  has a past surgical history that includes Pacemaker Implantation; Brain surgery; Breast Reduction; and Dilation and curettage of uterus.      Reported/Observed/Food/Nutrition Related History:     Pt sitting up in chair, on HFNC, reports fair appetite    Spoke with pt via phone as in isolation precautions    Anthropometrics   Height: 63in  Weight: 244lb bedscale  BMI: 43  BMI classification: Obese Class III extreme obesity: > or equal to 40kg/m2   Labs reviewed     Results from last 7 days   Lab Units 05/04/20  0408  05/01/20  0343   SODIUM mmol/L 137   < > 139   POTASSIUM mmol/L 3.9   < > 4.2   CHLORIDE mmol/L 96*   < > 97*   CO2 mmol/L 28.0   < > 27.0   BUN mg/dL 28*   < > 16   CREATININE mg/dL 0.72   < > 0.82   CALCIUM mg/dL 8.4*   < > 8.3*   BILIRUBIN mg/dL  --   --  0.3   ALK PHOS U/L  --   --  62   ALT (SGPT) U/L  --   --  11   AST (SGOT) U/L  --   --  34*   GLUCOSE mg/dL 141*   < > 108*    < > = values in this interval not displayed.           No results found for: HGBA1C    Medications reviewed   Pertinent:pravastatin    Intake/Ouptut 24 hrs (7:00AM - 6:59 AM)     Intake & Output (last day)       05/03 0701 - 05/04 0700 05/04 0701 - 05/05 0700    P.O. 530 200    I.V. (mL/kg)  10 (0.1)    IV Piggyback      Total Intake(mL/kg) 530 (4.8) 210 (1.9)    Urine (mL/kg/hr) 1000 (0.4) 200 (0.3)    Stool 0 0    Total Output 1000 200    Net -470 +10          Urine Unmeasured Occurrence 2 x     Stool Unmeasured Occurrence 2 x 1  x               GI:wnl    SKIN: bruised    Current Nutrition Prescription     PO: Diet Regular; Cardiac    Intake : 63% of 8 meals    Nutrition Diagnosis   Date: 5-4-20  Problem Inadequate oral intake   Etiology Per Clinical Status:   Signs/Symptoms Po intake 63%     Nutrition Intervention   1.  Follow treatment progress, Interview for preferences, Menu provided, Menu adjusted, Supplement provided    Boost+ 2x/day    Goal:   General: Nutrition support treatment  PO: Increase intake      Monitoring/Evaluation:   Per protocol, I&O, PO intake, Pertinent labs, Weight, Skin status, GI status, Symptoms    Loly Jane, YOLIS  Time Spent: 45min

## 2020-05-05 LAB
ANION GAP SERPL CALCULATED.3IONS-SCNC: 13 MMOL/L (ref 5–15)
BUN BLD-MCNC: 23 MG/DL (ref 8–23)
BUN/CREAT SERPL: 35.9 (ref 7–25)
CALCIUM SPEC-SCNC: 8.7 MG/DL (ref 8.6–10.5)
CHLORIDE SERPL-SCNC: 96 MMOL/L (ref 98–107)
CO2 SERPL-SCNC: 30 MMOL/L (ref 22–29)
CREAT BLD-MCNC: 0.64 MG/DL (ref 0.57–1)
FERRITIN SERPL-MCNC: 654.1 NG/ML (ref 13–150)
GFR SERPL CREATININE-BSD FRML MDRD: 93 ML/MIN/1.73
GLUCOSE BLD-MCNC: 100 MG/DL (ref 65–99)
LDH SERPL-CCNC: 427 U/L (ref 135–214)
MAGNESIUM SERPL-MCNC: 2.2 MG/DL (ref 1.6–2.4)
PHOSPHATE SERPL-MCNC: 2.8 MG/DL (ref 2.5–4.5)
POTASSIUM BLD-SCNC: 4 MMOL/L (ref 3.5–5.2)
SODIUM BLD-SCNC: 139 MMOL/L (ref 136–145)

## 2020-05-05 PROCEDURE — 83735 ASSAY OF MAGNESIUM: CPT | Performed by: INTERNAL MEDICINE

## 2020-05-05 PROCEDURE — 83615 LACTATE (LD) (LDH) ENZYME: CPT | Performed by: INTERNAL MEDICINE

## 2020-05-05 PROCEDURE — 25010000002 ENOXAPARIN PER 10 MG: Performed by: INTERNAL MEDICINE

## 2020-05-05 PROCEDURE — 84100 ASSAY OF PHOSPHORUS: CPT | Performed by: INTERNAL MEDICINE

## 2020-05-05 PROCEDURE — 99232 SBSQ HOSP IP/OBS MODERATE 35: CPT | Performed by: INTERNAL MEDICINE

## 2020-05-05 PROCEDURE — 82728 ASSAY OF FERRITIN: CPT | Performed by: INTERNAL MEDICINE

## 2020-05-05 PROCEDURE — 80048 BASIC METABOLIC PNL TOTAL CA: CPT | Performed by: INTERNAL MEDICINE

## 2020-05-05 RX ADMIN — DILTIAZEM HYDROCHLORIDE 240 MG: 240 CAPSULE, COATED, EXTENDED RELEASE ORAL at 08:34

## 2020-05-05 RX ADMIN — ACETAMINOPHEN 650 MG: 325 TABLET, FILM COATED ORAL at 21:59

## 2020-05-05 RX ADMIN — ACETAMINOPHEN 650 MG: 325 TABLET, FILM COATED ORAL at 08:43

## 2020-05-05 RX ADMIN — ENOXAPARIN SODIUM 120 MG: 120 INJECTION SUBCUTANEOUS at 20:03

## 2020-05-05 RX ADMIN — ATENOLOL 25 MG: 25 TABLET ORAL at 08:34

## 2020-05-05 RX ADMIN — ACETAMINOPHEN 650 MG: 325 TABLET, FILM COATED ORAL at 15:03

## 2020-05-05 RX ADMIN — SODIUM CHLORIDE, PRESERVATIVE FREE 10 ML: 5 INJECTION INTRAVENOUS at 08:34

## 2020-05-05 RX ADMIN — PRAVASTATIN SODIUM 40 MG: 40 TABLET ORAL at 20:03

## 2020-05-05 RX ADMIN — SODIUM CHLORIDE, PRESERVATIVE FREE 10 ML: 5 INJECTION INTRAVENOUS at 20:03

## 2020-05-05 RX ADMIN — ENOXAPARIN SODIUM 120 MG: 120 INJECTION SUBCUTANEOUS at 08:34

## 2020-05-05 NOTE — PLAN OF CARE
Problem: Patient Care Overview  Goal: Plan of Care Review  Outcome: Ongoing (interventions implemented as appropriate)  Flowsheets (Taken 5/5/2020 5825)  Progress: improving  Plan of Care Reviewed With: patient  Outcome Summary: patient tolerating more activity. still requiring HFNC at 30L and 40%. saturations 88-91%. VSS. denies any needs at this time.

## 2020-05-05 NOTE — PROGRESS NOTES
INFECTIOUS DISEASE CONSULT/INITIAL HOSPITAL VISIT    Marlene Aguirre  1955  [id number]    Date of Consult: 5/5/2020    Admission Date: 4/29/2020      Requesting Provider: Rishi Gill MD  Evaluating Physician: Brxaton Horn MD    Reason for Consultation: COVID-19    History of present illness:    Patient is a 64 y.o. female who has intermittent asthma who has history of tobacco use and quit 3 years ago has multiple family members living with her.  Apparently patient developed headache, diarrhea, dry cough was given a Z-Jules by primary care physician on April 27,020 patient had progressive cough and fever and hypoxia on April 29.    Apparently a therapist that was coming to the house tested positive for COVID prior to patient's symptoms.    Patient admitted to the floor but because of worsening hypoxia and fevers was transferred to the intensive care unit.  Patient given 1 dose of 800 mg of Actemra last night    Patient is stable hemodynamically and on oxygen by nasal cannula currently her sats on 6 L are 91%    CT scan performed high probability for COVID-19 with bilateral groundglass opacities    COVID PCR positive    5/1/2020 patient received 2 units of convalescent serum plasma yesterday is received IV Lasix as well remains on 6 L with O2 sats between 88% and 90%.  Patient is a good spirits is sitting in chair is able to order food for nutrition per telephone call.  Per nursing staff had liquid bowel movements x3 yesterday is otherwise afebrile and hemodynamically stable    Review of systems remark for diarrhea no fevers    5/2/2020; patient required 8 L of oxygen by nasal cannula last night is otherwise hemodynamically stable.  Patient is eating small amounts reports that she ate for sherbet yesterday with the food otherwise states like cardboard.    Patient has intermittent cough which is productive.  She denies fevers rash sore throat ;    Patient was talked to by telephone and inspected  through the window.    5/3/2020; patient is doing fair he is on high flow nasal cannula now with better O2 sats but is on increasing amounts of oxygen.  Patient is afebrile and hemodynamically stable patient is being diuresed by pulmonary.  Does not report rash or sore throat patient spoke to over the telephone and visualized through the window    5/4/20; doing well; reports nose is stopped up. no events overnight; afebrile, hemodynamically stable; on high flow nasal cannula this is being weaned; denies chest pain, but does have intermittent coughing. Eating better.   Patient inspected from window and talked to by telephone    5/5/2020 patient is on less oxygen has been transitioned from high flow nasal cannula to regular nasal cannula on 6 L currently patient is resting quietly; reports congestion, abdominal cramping, diarrhea.     Past Medical History:   Diagnosis Date   • Asthma    • Atrial fibrillation (CMS/HCC)    • Coronary artery disease    • Hypertension        Past Surgical History:   Procedure Laterality Date   • BILATERAL BREAST REDUCTION     • BRAIN SURGERY     • DILATATION AND CURETTAGE     • PACEMAKER IMPLANTATION         History reviewed. No pertinent family history.    Social History     Socioeconomic History   • Marital status:      Spouse name: Not on file   • Number of children: Not on file   • Years of education: Not on file   • Highest education level: Not on file   Tobacco Use   • Smoking status: Former Smoker   • Smokeless tobacco: Never Used   • Tobacco comment: QUIT 3 YEARS AGO   Substance and Sexual Activity   • Alcohol use: Never     Frequency: Never   • Drug use: Never   • Sexual activity: Defer       Allergies   Allergen Reactions   • Contrast Dye Swelling     FACE, NOSE AND NECK SWELLING AS WELL AS SANDI    • Doxycycline Unknown - Low Severity     PT UNSURE OF REACTION    • Flexeril [Cyclobenzaprine] Hives   • Keflex [Cephalexin] Unknown - Low Severity     PT DOES NOT REMEMBER    •  Kenalog [Triamcinolone Acetonide] Itching   • Penicillins Hives   • Sulfa Antibiotics Itching   • Tetracyclines & Related Unknown - Low Severity     PT UNSURE OF REACTION   • Levaquin [Levofloxacin] Rash          Medication:    Current Facility-Administered Medications:   •  acetaminophen (TYLENOL) tablet 650 mg, 650 mg, Oral, Q6H PRN, Kan Ng MD, 650 mg at 05/05/20 0843  •  albuterol sulfate HFA (PROVENTIL HFA;VENTOLIN HFA;PROAIR HFA) inhaler 2 puff, 2 puff, Inhalation, Q4H PRN, Kan Ng MD, 2 puff at 04/30/20 1612  •  atenolol (TENORMIN) tablet 25 mg, 25 mg, Oral, Daily, Kan Ng MD, 25 mg at 05/05/20 0834  •  dilTIAZem CD (CARDIZEM CD) 24 hr capsule 240 mg, 240 mg, Oral, Daily, Kan Ng MD, 240 mg at 05/05/20 0834  •  diphenhydrAMINE (BENADRYL) capsule 50 mg, 50 mg, Oral, BID PRN, Kan Ng MD, 50 mg at 05/02/20 1607  •  enoxaparin (LOVENOX) syringe 120 mg, 1 mg/kg, Subcutaneous, Q12H, Kan Ng MD, 120 mg at 05/05/20 0834  •  magnesium sulfate 4 gram infusion - Mg less than or equal to 1mg/dL, 4 g, Intravenous, PRN **OR** magnesium sulfate 3 gram infusion (1gm x 3) - Mg 1.1 - 1.5 mg/dL, 1 g, Intravenous, PRN **OR** Magnesium Sulfate 2 gram infusion- Mg 1.6 - 1.9 mg/dL, 2 g, Intravenous, PRN, Kan Ng MD, Last Rate: 25 mL/hr at 05/01/20 0623, 2 g at 05/01/20 0623  •  potassium chloride (MICRO-K) CR capsule 40 mEq, 40 mEq, Oral, PRN, Kan Ng MD, 40 mEq at 04/30/20 1221  •  pravastatin (PRAVACHOL) tablet 40 mg, 40 mg, Oral, Nightly, Braxton Horn MD, 40 mg at 05/04/20 2014  •  sodium chloride 0.9 % flush 10 mL, 10 mL, Intravenous, PRN, Kan Ng MD  •  sodium chloride 0.9 % flush 10 mL, 10 mL, Intravenous, Q12H, Kan Ng MD, 10 mL at 05/05/20 0834  •  sodium chloride 0.9 % flush 10 mL, 10 mL, Intravenous, PRN, Kan Ng MD, 10 mL at 05/01/20 0001  •  sodium chloride nasal spray 2 spray, 2 spray, Each Nare, PRN, Sondra Lundy, APRN, 2  spray at 20    Antibiotics:  Anti-Infectives (From admission, onward)    Ordered     Dose/Rate Route Frequency Start Stop    20  vancomycin 2250 mg/500 mL 0.9% NS IVPB ([facility])     Ordering Provider:  Sondra Worrell RPH    20 mg/kg × 118 kg  over 150 Minutes Intravenous Once 20 0022    20 1943  aztreonam (AZACTAM) 1 g in 50 mL NS IVPB     Ordering Provider:  Sondra Worrell RPH    1 g  over 30 Minutes Intravenous Once 20 0200            Review of Systems:  See HPI      Physical Exam:   Vital Signs  Temp (24hrs), Av.4 °F (36.3 °C), Min:96.6 °F (35.9 °C), Max:97.7 °F (36.5 °C)    Temp  Min: 96.6 °F (35.9 °C)  Max: 97.7 °F (36.5 °C)  BP  Min: 98/83  Max: 133/83  Pulse  Min: 60  Max: 78  Resp  Min: 18  Max: 20  SpO2  Min: 72 %  Max: 96 %  Patient seen through window and talk to on the telephone  GENERAL: Resting quietly in bed is on  oxygen by nasal cannula  HEENT: Normocephalic, atraumatic.  EOMI no external oral lesions    HEART: Sinus rhythm by telemetry  LUNGS: Bilateral chest wall expansion no distress  ABDOMEN: Bowel sounds were present.  No obvious abdominal distention on exam        NEURO: Oriented to PPT.  Able to have an appropriate conversation  PSYCHIATRIC:  Normal affect    Laboratory Data    Results from last 7 days   Lab Units 20  0408 20  0626 20  0346   WBC 10*3/mm3 7.40 5.60 2.54*   HEMOGLOBIN g/dL 13.4 13.6 13.5   HEMATOCRIT % 41.0 43.5 44.1   PLATELETS 10*3/mm3 317 292 253     Results from last 7 days   Lab Units 20  0807   SODIUM mmol/L 139   POTASSIUM mmol/L 4.0   CHLORIDE mmol/L 96*   CO2 mmol/L 30.0*   BUN mg/dL 23   CREATININE mg/dL 0.64   GLUCOSE mg/dL 100*   CALCIUM mg/dL 8.7     Results from last 7 days   Lab Units 20  0343   ALK PHOS U/L 62   BILIRUBIN mg/dL 0.3   ALT (SGPT) U/L 11   AST (SGOT) U/L 34*         Results from last 7 days   Lab Units 20  0408   CRP mg/dL 0.66*     Results from  last 7 days   Lab Units 04/29/20  1511   LACTATE mmol/L 1.6             Estimated Creatinine Clearance: 106.3 mL/min (by C-G formula based on SCr of 0.64 mg/dL).      Microbiology:  No results found for: ACANTHNAEG, AFBCX, BPERTUSSISCX, BLOODCX  No results found for: BCIDPCR, CXREFLEX, CSFCX, CULTURETIS  No results found for: CULTURES, HSVCX, URCX  No results found for: EYECULTURE, GCCX, LABHSV  No results found for: LEGIONELLA, MRSACX, MUMPSCX, MYCOPLASCX  No results found for: NOCARDIACX, STOOLCX  No results found for: THROATCX, UNSTIMCULT, URINECX, CULTURE, VZVCULTUR  No results found for: VIRALCULTU, WOUNDCX        Radiology:  Imaging Results (Last 72 Hours)     Procedure Component Value Units Date/Time    XR Chest 1 View [[id number]] Collected:  05/04/20 0818     Updated:  05/04/20 0952    Narrative:       EXAMINATION: XR CHEST 1 VW- 05/04/2020     INDICATION: J96.01-Acute respiratory failure with hypoxia;  U07.1-COVID-19      COMPARISON: 05/01/2020     FINDINGS: Portable chest reveals patchy airspace disease seen within the  right upper lobe which is stable and unchanged. The heart is enlarged.  Increased markings seen within left mid and lower lung field.  Degenerative changes seen within the spine. Cardiac pacer leads in  satisfactory position.           Impression:       Some improvement seen in aeration of the right upper and  left lower lung field. The heart is enlarged. Pacer leads remain in  satisfactory position.     D:  05/04/2020  E:  05/04/2020     This report was finalized on 5/4/2020 9:49 AM by Dr. Suzy Muhammad MD.               Impression:   CO VID 19 pneumonitis  Hypoxia  Acute respiratory failure  Probable cytokine storm manifested by elevated ferritin elevated LDH, hypoxia  Penicillin allergy; questionable throat swelling  Doxycycline intolerance    Patient has received Actemra on 4/29  Received 2 units of serum convalescent plasma therapy on 4/30    PLAN/RECOMMENDATIONS:   Thank you for  asking us to see Marlene Aguirre, I recommend the following:    Continue supportive care agree with diuresis  Agree with Lovenox for DVT prophylaxis  Continue pravastatin for cardioprotection        Watch patient for clinical deterioration in severe cases of cytokine storm patient may require more than 1 dose of Actemra.    IL-6 level is 130 on 4/30/2020 prior to dose of Actemra    5/4/20 ferritin continues to downtrend; patient may be improving;        Watch oxygenation      Continue supportive care      sLow improvement if hemodynamically stable and on stable oxygen may be able to be transferred to the floor next 24 hours  Braxton Horn MD  5/5/2020  13:49

## 2020-05-05 NOTE — PROGRESS NOTES
INTENSIVIST   PROGRESS NOTE     Hospital:  LOS: 6 days     Ms. Marlene Aguirre, 64 y.o. female is followed for a Chief Complaint of: Shortness of Breath      Subjective   S     Interval History:  No acute events. Remains on HFNC at 30L and 35% FiO2.        The patient's relevant past medical, surgical and social history were reviewed and updated in Epic as appropriate.      ROS:   Constitutional: Negative for fever.   Respiratory: Positive for dyspnea.   Cardiovascular: Negative for chest pain.   Gastrointestinal: Negative for  nausea, vomiting and diarrhea.     Objective   O     Vitals:  Temp  Min: 96.6 °F (35.9 °C)  Max: 97.7 °F (36.5 °C)  BP  Min: 98/83  Max: 133/83  Pulse  Min: 60  Max: 78  Resp  Min: 18  Max: 20  SpO2  Min: 72 %  Max: 96 % Flow (L/min)  Min: 30  Max: 30    Intake/Ouptut 24 hrs (7:00AM - 6:59 AM)  Intake & Output (last 3 days)       05/02 0701 - 05/03 0700 05/03 0701 - 05/04 0700 05/04 0701 - 05/05 0700 05/05 0701 - 05/06 0700    P.O. 60 530 350     I.V. (mL/kg)   10 (0.1)     IV Piggyback 50       Total Intake(mL/kg) 110 (1) 530 (4.8) 360 (3.2)     Urine (mL/kg/hr) 725 (0.3) 1000 (0.4) 950 (0.4) 100 (0.1)    Stool 0 0 0 0    Total Output 725 1000 950 100    Net -615 -470 -590 -100            Urine Unmeasured Occurrence 1 x 2 x 1 x     Stool Unmeasured Occurrence 1 x 2 x 3 x 1 x            Physical Examination  Telemetry:  Normal sinus rhythm.    Constitutional:  No acute distress.  Sitting up in a chair on HFNC.    Eyes: No scleral icterus.   PERRL, EOM intact.    Neck:  Supple, FROM   Cardiovascular: Normal rate, regular and rhythm. Normal heart sounds.  No murmurs, gallop or rub.   Respiratory: No respiratory distress. Normal respiratory effort.  Diminished bilaterally. No wheezing.     Abdominal:  Soft. No masses. Non-tender. No distension. No HSM.   Extremities: No digital cyanosis. No clubbing.  No peripheral edema.   Skin: No rashes, lesions or ulcers   Neurological:   Alert and Oriented  to person, place, and time.              Results from last 7 days   Lab Units 05/04/20  0408 05/03/20  0626 05/02/20  0346   WBC 10*3/mm3 7.40 5.60 2.54*   HEMOGLOBIN g/dL 13.4 13.6 13.5   MCV fL 88.7 90.6 92.6   PLATELETS 10*3/mm3 317 292 253     Results from last 7 days   Lab Units 05/05/20  0807 05/04/20  0408 05/03/20  0627 05/02/20  0346 05/01/20  0343   SODIUM mmol/L 139 137 137 138 139   POTASSIUM mmol/L 4.0 3.9 4.5 4.5 4.2   CO2 mmol/L 30.0* 28.0 27.0 26.0 27.0   CREATININE mg/dL 0.64 0.72 0.73 0.83 0.82   GLUCOSE mg/dL 100* 141* 163* 155* 108*   MAGNESIUM mg/dL 2.2  --   --  2.2 1.9   PHOSPHORUS mg/dL 2.8  --   --   --  2.7     Estimated Creatinine Clearance: 106.3 mL/min (by C-G formula based on SCr of 0.64 mg/dL).  Results from last 7 days   Lab Units 05/01/20  0343 04/29/20  1511   ALK PHOS U/L 62 65   BILIRUBIN mg/dL 0.3 0.4   ALT (SGPT) U/L 11 7   AST (SGOT) U/L 34* 25       Results from last 7 days   Lab Units 04/29/20  1638   PH, ARTERIAL pH units 7.420   PCO2, ARTERIAL mm Hg 41.5   PO2 ART mm Hg 68.3*   FIO2 % 28       Images:  Imaging Results (Last 24 Hours)     ** No results found for the last 24 hours. **              Results: Reviewed.  I reviewed the patient's new laboratory and imaging results.  I independently reviewed the patient's new images.    Medications: Reviewed.    Assessment/Plan   A / P     Ms. Aguirre is a 65yo F who is admitted for COVID-19 pneumonia. She was transferred to the ICU secondary to worsening hypoxia and fevers. She has received convalescent plasma and Actemra. She has remained on HFNC.     Nutrition:   Diet Regular; Cardiac  Advance Directives:   Code Status and Medical Interventions:   Ordered at: 04/29/20 1943     Code Status:    CPR     Medical Interventions (Level of Support Prior to Arrest):    Full       Active Hospital Problems    Diagnosis   • **COVID-19 Pneumonitis   • Acute hypoxemic respiratory failure (CMS/HCC)   • Fever   • Paroxysmal atrial fibrillation  (CMS/[facility])   • Obesity (BMI 30-39.9)       Assessment / Plan:    1. Wean HFNC as tolerated. May be able to transition to low-flow nasal cannula as tolerated.   2. Continue DVT prophylaxis.   3. PT/OT Mobilize as tolerated  4. AM labs     High level of risk due to:  severe exacerbation of chronic illness and illness with threat to life or bodily function.    Plan of care and goals reviewed during interdisciplinary rounds.  I discussed the patient's findings and my recommendations with patient and nursing staff      Conchita Meza, DO    Intensive Care Medicine and Pulmonary Medicine

## 2020-05-05 NOTE — PROGRESS NOTES
Clinical Nutrition     Multidisciplinary Rounds      Patient Name: Marlene Aguirre  Date of Encounter: 05/05/20 12:21  MRN: [id number]  Admission date: 4/29/2020      Reason for visit: GRACE. RD to continue to follow per protocol.      Pt reports not able to eat much lunch, c/o upset stomach    Spoke with pt via phone as in isolation precautions    Current diet: Diet Regular; Cardiac  Boost+ 2x/day    Intervention:  Follow treatment plan  Care plan reviewed  Menu adjusted    Follow up:   Per protocol      Loly Jane RD  12:21 PM  Time: 15min

## 2020-05-05 NOTE — PLAN OF CARE
Problem: Patient Care Overview  Goal: Plan of Care Review  Outcome: Ongoing (interventions implemented as appropriate)  Flowsheets  Taken 5/5/2020 1718  Progress: improving  Taken 5/5/2020 1600  Plan of Care Reviewed With: patient  Note:   Uneventful shift, Patient has been tolerating Nasal canula, weaned down from 6 to 4 liters. VSS, UOP adequate, 3 large BM's today. Continue to monitor.

## 2020-05-06 LAB — IL6 SERPL-MCNC: 1132.9 PG/ML (ref 0–15.5)

## 2020-05-06 PROCEDURE — 25010000002 ENOXAPARIN PER 10 MG: Performed by: INTERNAL MEDICINE

## 2020-05-06 PROCEDURE — 97530 THERAPEUTIC ACTIVITIES: CPT

## 2020-05-06 PROCEDURE — 97162 PT EVAL MOD COMPLEX 30 MIN: CPT

## 2020-05-06 PROCEDURE — 99232 SBSQ HOSP IP/OBS MODERATE 35: CPT | Performed by: INTERNAL MEDICINE

## 2020-05-06 RX ADMIN — PRAVASTATIN SODIUM 40 MG: 40 TABLET ORAL at 20:47

## 2020-05-06 RX ADMIN — ACETAMINOPHEN 650 MG: 325 TABLET, FILM COATED ORAL at 05:20

## 2020-05-06 RX ADMIN — SODIUM CHLORIDE, PRESERVATIVE FREE 10 ML: 5 INJECTION INTRAVENOUS at 20:48

## 2020-05-06 RX ADMIN — ATENOLOL 25 MG: 25 TABLET ORAL at 08:12

## 2020-05-06 RX ADMIN — DILTIAZEM HYDROCHLORIDE 240 MG: 240 CAPSULE, COATED, EXTENDED RELEASE ORAL at 08:12

## 2020-05-06 RX ADMIN — ACETAMINOPHEN 650 MG: 325 TABLET, FILM COATED ORAL at 13:37

## 2020-05-06 RX ADMIN — SODIUM CHLORIDE, PRESERVATIVE FREE 10 ML: 5 INJECTION INTRAVENOUS at 08:13

## 2020-05-06 RX ADMIN — ENOXAPARIN SODIUM 120 MG: 120 INJECTION SUBCUTANEOUS at 20:47

## 2020-05-06 RX ADMIN — ACETAMINOPHEN 650 MG: 325 TABLET, FILM COATED ORAL at 20:47

## 2020-05-06 RX ADMIN — ENOXAPARIN SODIUM 120 MG: 120 INJECTION SUBCUTANEOUS at 08:13

## 2020-05-06 NOTE — PROGRESS NOTES
Continued Stay Note  [facility]     Patient Name: Marlene Aguirre  MRN: [id number]  Today's Date: 5/6/2020    Admit Date: 4/29/2020    Discharge Plan     Row Name 05/06/20 1131       Plan    Plan  Ongoing    Plan Comments  Discussed in MDR.  Patient now on nasal cannula.  PT/OT consults pending.  Patient may need rehab but last positive for COVID was 4/29/2020.  CM will continue to follow.        Discharge Codes    No documentation.       Expected Discharge Date and Time     Expected Discharge Date Expected Discharge Time    May 11, 2020             Rolanda Lujan RN

## 2020-05-06 NOTE — PROGRESS NOTES
Continued Stay Note  [facility]     Patient Name: Marlene Aguirre  MRN: [id number]  Today's Date: 5/6/2020    Admit Date: 4/29/2020    Discharge Plan     Row Name 05/06/20 1438       Plan    Plan  MSW available     Plan Comments  Spoke with pt over the phone due to COVID 19 dx. Pt requesting letter re: her hospital admission and dx to be emailed to her at [email or web address]. Letter provided.         Discharge Codes    No documentation.       Expected Discharge Date and Time     Expected Discharge Date Expected Discharge Time    May 11, 2020             EFRA Campuzano

## 2020-05-06 NOTE — PLAN OF CARE
Problem: Patient Care Overview  Goal: Plan of Care Review  Outcome: Ongoing (interventions implemented as appropriate)  Flowsheets  Taken 5/6/2020 1546 by Julita James RN  Progress: improving  Taken 5/6/2020 1627 by Monica Bernal PT  Plan of Care Reviewed With: patient  Outcome Summary: PT initial evaluation completed. Pt demonstrates generalized weakness and decreased functional endurance, warranting further skilled PT services to promote PLOF. Limited today by fatigue and c/o slight dizziness with mobility; ambulated 10 ft min A with RW. Recommend d/c home with assist and HHPT.

## 2020-05-06 NOTE — PLAN OF CARE
Problem: Patient Care Overview  Goal: Plan of Care Review  Outcome: Ongoing (interventions implemented as appropriate)  Flowsheets  Taken 5/6/2020 1546  Progress: improving  Outcome Summary: VSS, remains on 2-3L NC. Transferred  to tele to 6A. BRIANNA coffman, BM x1. Worked with PT today, up to chair most of shift.  Taken 5/6/2020 1544  Plan of Care Reviewed With: patient

## 2020-05-06 NOTE — PROGRESS NOTES
INFECTIOUS DISEASE CONSULT/INITIAL HOSPITAL VISIT    Marlene Aguirre  1955  [id number]    Date of Consult: 5/6/2020    Admission Date: 4/29/2020      Requesting Provider: Rishi Gill MD  Evaluating Physician: Braxton Horn MD    Reason for Consultation: COVID-19    History of present illness:    Patient is a 64 y.o. female who has intermittent asthma who has history of tobacco use and quit 3 years ago has multiple family members living with her.  Apparently patient developed headache, diarrhea, dry cough was given a Z-Jules by primary care physician on April 27,020 patient had progressive cough and fever and hypoxia on April 29.    Apparently a therapist that was coming to the house tested positive for COVID prior to patient's symptoms.    Patient admitted to the floor but because of worsening hypoxia and fevers was transferred to the intensive care unit.  Patient given 1 dose of 800 mg of Actemra last night    Patient is stable hemodynamically and on oxygen by nasal cannula currently her sats on 6 L are 91%    CT scan performed high probability for COVID-19 with bilateral groundglass opacities    COVID PCR positive    5/1/2020 patient received 2 units of convalescent serum plasma yesterday is received IV Lasix as well remains on 6 L with O2 sats between 88% and 90%.  Patient is a good spirits is sitting in chair is able to order food for nutrition per telephone call.  Per nursing staff had liquid bowel movements x3 yesterday is otherwise afebrile and hemodynamically stable    Review of systems remark for diarrhea no fevers    5/2/2020; patient required 8 L of oxygen by nasal cannula last night is otherwise hemodynamically stable.  Patient is eating small amounts reports that she ate for sherbet yesterday with the food otherwise states like cardboard.    Patient has intermittent cough which is productive.  She denies fevers rash sore throat ;    Patient was talked to by telephone and inspected  through the window.    5/3/2020; patient is doing fair he is on high flow nasal cannula now with better O2 sats but is on increasing amounts of oxygen.  Patient is afebrile and hemodynamically stable patient is being diuresed by pulmonary.  Does not report rash or sore throat patient spoke to over the telephone and visualized through the window    5/4/20; doing well; reports nose is stopped up. no events overnight; afebrile, hemodynamically stable; on high flow nasal cannula this is being weaned; denies chest pain, but does have intermittent coughing. Eating better.   Patient inspected from window and talked to by telephone    5/5/2020 patient is on less oxygen has been transitioned from high flow nasal cannula to regular nasal cannula on 6 L currently patient is resting quietly; reports congestion, abdominal cramping, diarrhea.   5/6/2020 patient is on 3 L oxygen is afebrile and hemodynamically stable to be transferred to floor good spirits no complaints today    Past Medical History:   Diagnosis Date   • Asthma    • Atrial fibrillation (CMS/HCC)    • Coronary artery disease    • Hypertension        Past Surgical History:   Procedure Laterality Date   • BILATERAL BREAST REDUCTION     • BRAIN SURGERY     • DILATATION AND CURETTAGE     • PACEMAKER IMPLANTATION         History reviewed. No pertinent family history.    Social History     Socioeconomic History   • Marital status:      Spouse name: Not on file   • Number of children: Not on file   • Years of education: Not on file   • Highest education level: Not on file   Tobacco Use   • Smoking status: Former Smoker   • Smokeless tobacco: Never Used   • Tobacco comment: QUIT 3 YEARS AGO   Substance and Sexual Activity   • Alcohol use: Never     Frequency: Never   • Drug use: Never   • Sexual activity: Defer       Allergies   Allergen Reactions   • Contrast Dye Swelling     FACE, NOSE AND NECK SWELLING AS WELL AS SANDI    • Doxycycline Unknown - Low Severity     PT  UNSURE OF REACTION    • Flexeril [Cyclobenzaprine] Hives   • Keflex [Cephalexin] Unknown - Low Severity     PT DOES NOT REMEMBER    • Kenalog [Triamcinolone Acetonide] Itching   • Penicillins Hives   • Sulfa Antibiotics Itching   • Tetracyclines & Related Unknown - Low Severity     PT UNSURE OF REACTION   • Levaquin [Levofloxacin] Rash          Medication:    Current Facility-Administered Medications:   •  acetaminophen (TYLENOL) tablet 650 mg, 650 mg, Oral, Q6H PRN, Kan Ng MD, 650 mg at 05/06/20 1337  •  albuterol sulfate HFA (PROVENTIL HFA;VENTOLIN HFA;PROAIR HFA) inhaler 2 puff, 2 puff, Inhalation, Q4H PRN, Kan Ng MD, 2 puff at 04/30/20 1612  •  atenolol (TENORMIN) tablet 25 mg, 25 mg, Oral, Daily, Kan Ng MD, 25 mg at 05/06/20 0812  •  dilTIAZem CD (CARDIZEM CD) 24 hr capsule 240 mg, 240 mg, Oral, Daily, Kan Ng MD, 240 mg at 05/06/20 0812  •  diphenhydrAMINE (BENADRYL) capsule 50 mg, 50 mg, Oral, BID PRN, Kan Ng MD, 50 mg at 05/02/20 1607  •  enoxaparin (LOVENOX) syringe 120 mg, 1 mg/kg, Subcutaneous, Q12H, Kan Ng MD, 120 mg at 05/06/20 0813  •  magnesium sulfate 4 gram infusion - Mg less than or equal to 1mg/dL, 4 g, Intravenous, PRN **OR** magnesium sulfate 3 gram infusion (1gm x 3) - Mg 1.1 - 1.5 mg/dL, 1 g, Intravenous, PRN **OR** Magnesium Sulfate 2 gram infusion- Mg 1.6 - 1.9 mg/dL, 2 g, Intravenous, PRN, Kan Ng MD, Last Rate: 25 mL/hr at 05/01/20 0623, 2 g at 05/01/20 0623  •  potassium chloride (MICRO-K) CR capsule 40 mEq, 40 mEq, Oral, PRN, Kan Ng MD, 40 mEq at 04/30/20 1221  •  pravastatin (PRAVACHOL) tablet 40 mg, 40 mg, Oral, Nightly, Braxton Horn MD, 40 mg at 05/05/20 2003  •  sodium chloride 0.9 % flush 10 mL, 10 mL, Intravenous, PRN, Kan Ng MD  •  sodium chloride 0.9 % flush 10 mL, 10 mL, Intravenous, Q12H, Kan Ng MD, 10 mL at 05/06/20 0813  •  sodium chloride 0.9 % flush 10 mL, 10 mL, Intravenous,  Hernandez HENSLEY Yuri, MD, 10 mL at 20 0001  •  sodium chloride nasal spray 2 spray, 2 spray, Each Damaris, SHELLIE, Sondra Lundy APRN, 2 spray at 20    Antibiotics:  Anti-Infectives (From admission, onward)    Ordered     Dose/Rate Route Frequency Start Stop    20  vancomycin 2250 mg/500 mL 0.9% NS IVPB ([facility])     Ordering Provider:  Sondra Worrell RPH    20 mg/kg × 118 kg  over 150 Minutes Intravenous Once 20 0022    20 1943  aztreonam (AZACTAM) 1 g in 50 mL NS IVPB     Ordering Provider:  Sondra Worrell RPH    1 g  over 30 Minutes Intravenous Once 20 0200            Review of Systems:  See HPI      Physical Exam:   Vital Signs  Temp (24hrs), Av.5 °F (36.4 °C), Min:96.7 °F (35.9 °C), Max:97.9 °F (36.6 °C)    Temp  Min: 96.7 °F (35.9 °C)  Max: 97.9 °F (36.6 °C)  BP  Min: 97/85  Max: 131/79  Pulse  Min: 60  Max: 81  Resp  Min: 18  Max: 22  SpO2  Min: 88 %  Max: 96 %  Patient seen through window and talked to on the telephone  GENERAL: Resting quietly in bed is on  oxygen by nasal cannula  HEENT: Normocephalic, atraumatic.  EOMI no external oral lesions    HEART: Sinus rhythm by telemetry  LUNGS: Bilateral chest wall expansion no respiratory distress  ABDOMEN: Bowel sounds were present.  No obvious abdominal distention on exam        NEURO: Oriented to PPT.  Able to have an appropriate conversation  PSYCHIATRIC:  Normal affect    Laboratory Data    Results from last 7 days   Lab Units 20  0408 20  0626 20  0346   WBC 10*3/mm3 7.40 5.60 2.54*   HEMOGLOBIN g/dL 13.4 13.6 13.5   HEMATOCRIT % 41.0 43.5 44.1   PLATELETS 10*3/mm3 317 292 253     Results from last 7 days   Lab Units 20  0807   SODIUM mmol/L 139   POTASSIUM mmol/L 4.0   CHLORIDE mmol/L 96*   CO2 mmol/L 30.0*   BUN mg/dL 23   CREATININE mg/dL 0.64   GLUCOSE mg/dL 100*   CALCIUM mg/dL 8.7     Results from last 7 days   Lab Units 20  0343   ALK PHOS U/L 62    BILIRUBIN mg/dL 0.3   ALT (SGPT) U/L 11   AST (SGOT) U/L 34*         Results from last 7 days   Lab Units 05/04/20  0408   CRP mg/dL 0.66*     Results from last 7 days   Lab Units 04/29/20  1511   LACTATE mmol/L 1.6             Estimated Creatinine Clearance: 106.3 mL/min (by C-G formula based on SCr of 0.64 mg/dL).      Microbiology:  No results found for: ACANTHNAEG, AFBCX, BPERTUSSISCX, BLOODCX  No results found for: BCIDPCR, CXREFLEX, CSFCX, CULTURETIS  No results found for: CULTURES, HSVCX, URCX  No results found for: EYECULTURE, GCCX, LABHSV  No results found for: LEGIONELLA, MRSACX, MUMPSCX, MYCOPLASCX  No results found for: NOCARDIACX, STOOLCX  No results found for: THROATCX, UNSTIMCULT, URINECX, CULTURE, VZVCULTUR  No results found for: VIRALCULTU, WOUNDCX        Radiology:  Imaging Results (Last 72 Hours)     Procedure Component Value Units Date/Time    XR Chest 1 View [[id number]] Collected:  05/04/20 0818     Updated:  05/04/20 0952    Narrative:       EXAMINATION: XR CHEST 1 VW- 05/04/2020     INDICATION: J96.01-Acute respiratory failure with hypoxia;  U07.1-COVID-19      COMPARISON: 05/01/2020     FINDINGS: Portable chest reveals patchy airspace disease seen within the  right upper lobe which is stable and unchanged. The heart is enlarged.  Increased markings seen within left mid and lower lung field.  Degenerative changes seen within the spine. Cardiac pacer leads in  satisfactory position.           Impression:       Some improvement seen in aeration of the right upper and  left lower lung field. The heart is enlarged. Pacer leads remain in  satisfactory position.     D:  05/04/2020  E:  05/04/2020     This report was finalized on 5/4/2020 9:49 AM by Dr. Suzy Muhammad MD.               Impression:   CO VID 19 pneumonitis  Hypoxia  Acute respiratory failure  Probable cytokine storm manifested by elevated ferritin elevated LDH, hypoxia  Penicillin allergy; questionable throat  swelling  Doxycycline intolerance    Patient has received Actemra on 4/29  Received 2 units of serum convalescent plasma therapy on 4/30    PLAN/RECOMMENDATIONS:   Thank you for asking us to see Marlene Aguirre, I recommend the following:    Continue supportive care agree with diuresis  Agree with Lovenox for DVT prophylaxis  Continue pravastatin for cardioprotection        Cytokine storm appears to be resolving    IL-6 level is 130 on 4/30/2020 prior to dose of Actemra    5/6/20 ferritin continues to downtrend; patient may be improving;        Watch oxygenation this is improving      Continue supportive care      Okay to be transferred to the floor from my standpoint  Braxton Horn MD  5/6/2020  14:22

## 2020-05-06 NOTE — THERAPY EVALUATION
Patient Name: Marlene Aguirre  : 1955    MRN: [id number]                              Today's Date: 2020       Admit Date: 2020    Visit Dx:     ICD-10-CM ICD-9-CM   1. Acute hypoxemic respiratory failure (CMS/HCC) J96.01 518.81   2. COVID-19 virus infection U07.1      Patient Active Problem List   Diagnosis   • Acute hypoxemic respiratory failure (CMS/HCC)   • COVID-19 Pneumonitis   • Fever   • Paroxysmal atrial fibrillation (CMS/HCC)   • Chronic anticoagulation   • History of pacemaker   • Multiple allergies (ct scan iv contrast, pcn, keflex, quinalones, tetracyclines, kenalog)   • Obesity (BMI 30-39.9)   • Asthma     Past Medical History:   Diagnosis Date   • Asthma    • Atrial fibrillation (CMS/HCC)    • Coronary artery disease    • Hypertension      Past Surgical History:   Procedure Laterality Date   • BILATERAL BREAST REDUCTION     • BRAIN SURGERY     • DILATATION AND CURETTAGE     • PACEMAKER IMPLANTATION       General Information     Row Name 20 1623          PT Evaluation Time/Intention    Document Type  evaluation  -LS     Mode of Treatment  physical therapy  -     Row Name 20 1623          General Information    Patient Profile Reviewed?  yes  -LS     Prior Level of Function  independent:;all household mobility;ADL's;bathing;dressing  -LS     Existing Precautions/Restrictions  fall;oxygen therapy device and L/min  -LS     Row Name 20 1623          Relationship/Environment    Lives With  significant other;child(teresa), adult  -     Row Name 20 1623          Resource/Environmental Concerns    Current Living Arrangements  home/apartment/condo  -     Row Name 20 1623          Home Main Entrance    Number of Stairs, Main Entrance  none  -LS     Row Name 20 1623          Stairs Within Home, Primary    Number of Stairs, Within Home, Primary  none  -LS     Row Name 20 1623          Cognitive Assessment/Intervention- PT/OT    Orientation Status  (Cognition)  oriented x 4  -     Row Name 05/06/20 1623          Safety Issues, Functional Mobility    Impairments Affecting Function (Mobility)  balance;coordination;endurance/activity tolerance;shortness of breath;strength  -       User Key  (r) = Recorded By, (t) = Taken By, (c) = Cosigned By    Initials Name Provider Type    LS Monica Bernal, PT Physical Therapist        Mobility     Row Name 05/06/20 1624          Bed Mobility Assessment/Treatment    Comment (Bed Mobility)  UIC  -     Row Name 05/06/20 1624          Transfer Assessment/Treatment    Comment (Transfers)  STS x3 from recliner; x1 from BSC.  -     Row Name 05/06/20 1624          Bed-Chair Transfer    Bed-Chair Cass[place] (Transfers)  minimum assist (75% patient effort);verbal cues  -     Row Name 05/06/20 1624          Sit-Stand Transfer    Sit-Stand Cassia (Transfers)  minimum assist (75% patient effort);verbal cues  -     Assistive Device (Sit-Stand Transfers)  walker, front-wheeled  -     Row Name 05/06/20 1624          Gait/Stairs Assessment/Training    Gait/Stairs Assessment/Training  gait/ambulation independence  -     [place] Level (Gait)  minimum assist (75% patient effort);verbal cues  -     Assistive Device (Gait)  walker, front-wheeled  -     Distance in Feet (Gait)  10  -LS     Deviations/Abnormal Patterns (Gait)  bilateral deviations;nella decreased  -LS     Bilateral Gait Deviations  forward flexed posture;heel strike decreased  -LS     Comment (Gait/Stairs)  5 ft forward; 5 ft backward. Distance limited by c/o dizziness.   -       User Key  (r) = Recorded By, (t) = Taken By, (c) = Cosigned By    Initials Name Provider Type    Monica Plummer, PT Physical Therapist        Obj/Interventions     Row Name 05/06/20 1626          General ROM    GENERAL ROM COMMENTS  BLE WFL  -     Row Name 05/06/20 1626          MMT (Manual Muscle Testing)    General MMT Comments  BLE grossly #  -     Row Name  05/06/20 1626          Therapeutic Exercise    Lower Extremity (Therapeutic Exercise)  gluteal sets;quad sets, bilateral  -LS     Lower Extremity Range of Motion (Therapeutic Exercise)  ankle dorsiflexion/plantar flexion, bilateral  -LS     Exercise Type (Therapeutic Exercise)  AROM (active range of motion)  -LS     Position (Therapeutic Exercise)  seated  -LS     Sets/Reps (Therapeutic Exercise)  1/10  -     Row Name 05/06/20 1626          Static Sitting Balance    Level of [place] (Unsupported Sitting, Static Balance)  contact guard assist  -LS     Sitting Position (Unsupported Sitting, Static Balance)  sitting in chair  -     Row Name 05/06/20 1626          Static Standing Balance    Level of [place] (Supported Standing, Static Balance)  contact guard assist  -LS     Assistive Device Utilized (Supported Standing, Static Balance)  walker, rolling  -     Row Name 05/06/20 1626          Sensory Assessment/Intervention    Sensory General Assessment  no sensation deficits identified BLE  -LS       User Key  (r) = Recorded By, (t) = Taken By, (c) = Cosigned By    Initials Name Provider Type    LS Monica Benral, PT Physical Therapist        Goals/Plan     Row Name 05/06/20 1629          Bed Mobility Goal 1 (PT)    Activity/Assistive Device (Bed Mobility Goal 1, PT)  sit to supine/supine to sit  -LS     [place] Level/Cues Needed (Bed Mobility Goal 1, PT)  independent  -LS     Time Frame (Bed Mobility Goal 1, PT)  2 weeks  -LS     Progress/Outcomes (Bed Mobility Goal 1, PT)  goal ongoing  -     Row Name 05/06/20 1629          Transfer Goal 1 (PT)    Activity/Assistive Device (Transfer Goal 1, PT)  sit-to-stand/stand-to-sit  -LS     [place] Level/Cues Needed (Transfer Goal 1, PT)  conditional independence  -LS     Time Frame (Transfer Goal 1, PT)  2 weeks  -     Row Name 05/06/20 1629          Gait Training Goal 1 (PT)    Activity/Assistive Device (Gait Training Goal 1, PT)  gait (walking  locomotion);walker, rolling  -LS     [place] Level (Gait Training Goal 1, PT)  supervision required  -LS     Distance (Gait Goal 1, PT)  150  -LS     Time Frame (Gait Training Goal 1, PT)  2 weeks  -LS     Progress/Outcome (Gait Training Goal 1, PT)  goal ongoing  -LS       User Key  (r) = Recorded By, (t) = Taken By, (c) = Cosigned By    Initials Name Provider Type    LS Monica Bernal, PT Physical Therapist        Clinical Impression     Row Name 05/06/20 1627          Pain Assessment    Additional Documentation  Pain Scale: Numbers Pre/Post-Treatment (Group)  -LS     Row Name 05/06/20 1627          Pain Scale: Numbers Pre/Post-Treatment    Pain Scale: Numbers, Pretreatment  0/10 - no pain  -LS     Pain Scale: Numbers, Post-Treatment  0/10 - no pain  -LS     Row Name 05/06/20 1627          Plan of Care Review    Plan of Care Reviewed With  patient  -LS     Outcome Summary  PT initial evaluation completed. Pt demonstrates generalized weakness and decreased functional endurance, warranting further skilled PT services to promote PLOF. Limited today by fatigue and c/o slight dizziness with mobility; ambulated 10 ft min A with RW. Recommend d/c home with assist and HHPT.   -     Row Name 05/06/20 1627          Physical Therapy Clinical Impression    Patient/Family Goals Statement (PT Clinical Impression)  return to PLOF; go home  -LS     Criteria for Skilled Interventions Met (PT Clinical Impression)  yes;treatment indicated  -LS     Rehab Potential (PT Clinical Summary)  good, to achieve stated therapy goals  -     Row Name 05/06/20 1627          Vital Signs    Pre Systolic BP Rehab  -- grossly WFL; on NC O2  -LS     O2 Delivery Pre Treatment  nasal cannula  -LS     O2 Delivery Intra Treatment  nasal cannula  -LS     O2 Delivery Post Treatment  nasal cannula  -LS     Pre Patient Position  Sitting  -LS     Intra Patient Position  Standing  -LS     Post Patient Position  Sitting  -LS     Row Name 05/06/20 7755           Positioning and Restraints    Pre-Treatment Position  sitting in chair/recliner  -LS     Post Treatment Position  chair  -LS     In Chair  notified nsg;reclined;call light within reach;encouraged to call for assist;waffle cushion;legs elevated;heels elevated  -LS       User Key  (r) = Recorded By, (t) = Taken By, (c) = Cosigned By    Initials Name Provider Type    Monica Plummer, PT Physical Therapist        Outcome Measures     Row Name 05/06/20 1630          How much help from another person do you currently need...    Turning from your back to your side while in flat bed without using bedrails?  3  -LS     Moving from lying on back to sitting on the side of a flat bed without bedrails?  3  -LS     Moving to and from a bed to a chair (including a wheelchair)?  3  -LS     Standing up from a chair using your arms (e.g., wheelchair, bedside chair)?  3  -LS     Climbing 3-5 steps with a railing?  2  -LS     To walk in hospital room?  3  -LS     AM-PAC 6 Clicks Score (PT)  17  -     Row Name 05/06/20 1630          Functional Assessment    Outcome Measure Options  AM-PAC 6 Clicks Basic Mobility (PT)  -LS       User Key  (r) = Recorded By, (t) = Taken By, (c) = Cosigned By    Initials Name Provider Type    Monica Plummer, PT Physical Therapist          PT Recommendation and Plan  Planned Therapy Interventions (PT Eval): balance training, bed mobility training, gait training, home exercise program, strengthening, patient/family education, stair training, transfer training  Outcome Summary/Treatment Plan (PT)  Anticipated Discharge Disposition (PT): home with assist, home with home health  Plan of Care Reviewed With: patient  Outcome Summary: PT initial evaluation completed. Pt demonstrates generalized weakness and decreased functional endurance, warranting further skilled PT services to promote PLOF. Limited today by fatigue and c/o slight dizziness with mobility; ambulated 10 ft min A with RW. Recommend  d/c home with assist and HHPT.      Time Calculation:   PT Charges     Row Name 05/06/20 1634             Time Calculation    Start Time  1415  -      PT Received On  05/06/20  -      PT Goal Re-Cert Due Date  05/16/20  -         Time Calculation- PT    Total Timed Code Minutes- PT  14 minute(s)  -LS         Timed Charges    98353 - PT Therapeutic Activity Minutes  14  -LS        User Key  (r) = Recorded By, (t) = Taken By, (c) = Cosigned By    Initials Name Provider Type     Monica Bernal, PT Physical Therapist        Therapy Charges for Today     Code Description Service Date Service Provider Modifiers Qty    61185935011 HC PT EVAL MOD COMPLEXITY 4 5/6/2020 Monica Bernal, PT GP 1    02463575122 HC PT THERAPEUTIC ACT EA 15 MIN 5/6/2020 Monica Bernal, PT GP 1          PT G-Codes  Outcome Measure Options: AM-PAC 6 Clicks Basic Mobility (PT)  AM-PAC 6 Clicks Score (PT): 17    Monica Bernal, PT  5/6/2020

## 2020-05-06 NOTE — PLAN OF CARE
Problem: Patient Care Overview  Goal: Plan of Care Review  Outcome: Ongoing (interventions implemented as appropriate)  Flowsheets  Taken 5/6/2020 0726  Progress: improving  Outcome Summary: VSS throughout shift. Patient currently on 3L nasal cannula, saturation 89-94%. Complains of chronic back pain, treated with tylenol. UOP adequate.  Taken 5/6/2020 0600  Plan of Care Reviewed With: patient

## 2020-05-06 NOTE — PROGRESS NOTES
Clinical Nutrition     Multidisciplinary Rounds      Patient Name: Marlene Aguirre  Date of Encounter: 05/06/20 12:37  MRN: [id number]  Admission date: 4/29/2020      Reason for visit: GRACE. RD to continue to follow per protocol.     Pt sitting up in chair, on nasal cannula, reports fair appetite, can only tolerate bland foods    Spoke with pt via phone as in isolation precautions    Current diet: Diet Regular; Cardiac  Boost Breeze 2x/day    Intervention:  Follow treatment plan  Care plan reviewed  Menu/ Supplement adjusted    Follow up:   Per protocol      Loly Jane RD  12:37 PM  Time: 10min

## 2020-05-06 NOTE — PROGRESS NOTES
INTENSIVIST   PROGRESS NOTE     Hospital:  LOS: 7 days     Ms. Marlene Aguirre, 64 y.o. female is followed for a Chief Complaint of: Shortness of Breath      Subjective   S     Interval History:  No acute events. On 3L nasal cannula this AM.        The patient's relevant past medical, surgical and social history were reviewed and updated in Epic as appropriate.      ROS:   Constitutional: Negative for fever.   Respiratory: Positive for dyspnea.   Cardiovascular: Negative for chest pain.   Gastrointestinal: Negative for  nausea, vomiting and diarrhea.     Objective   O     Vitals:  Temp  Min: 96.7 °F (35.9 °C)  Max: 97.8 °F (36.6 °C)  BP  Min: 97/85  Max: 131/79  Pulse  Min: 60  Max: 81  Resp  Min: 18  Max: 22  SpO2  Min: 88 %  Max: 97 % Flow (L/min)  Min: 3  Max: 30    Intake/Ouptut 24 hrs (7:00AM - 6:59 AM)  Intake & Output (last 3 days)       05/03 0701 - 05/04 0700 05/04 0701 - 05/05 0700 05/05 0701 - 05/06 0700 05/06 0701 - 05/07 0700    P.O. 530 350 600     I.V. (mL/kg)  10 (0.1)      IV Piggyback        Total Intake(mL/kg) 530 (4.8) 360 (3.2) 600 (5.4)     Urine (mL/kg/hr) 1000 (0.4) 950 (0.4) 1050 (0.4) 200 (0.4)    Stool 0 0 0 0    Total Output 6062 795 1794 200    Net -470 -590 -450 -200            Urine Unmeasured Occurrence 2 x 1 x  1 x    Stool Unmeasured Occurrence 2 x 3 x 3 x 1 x            Physical Examination  Telemetry:  Normal sinus rhythm.    Constitutional:  No acute distress.  Sitting up in a chair on 3L nasal cannula.    Eyes: No scleral icterus.   PERRL, EOM intact.    Neck:  Supple, FROM   Cardiovascular: Normal rate, regular and rhythm. Normal heart sounds.  No murmurs, gallop or rub.   Respiratory: No respiratory distress. Normal respiratory effort.  Diminished bilaterally. No wheezing.     Abdominal:  Soft. No masses. Non-tender. No distension. No HSM.   Extremities: No digital cyanosis. No clubbing.  No peripheral edema.   Skin: No rashes, lesions or ulcers   Neurological:   Alert and  Oriented to person, place, and time.              Results from last 7 days   Lab Units 05/04/20  0408 05/03/20  0626 05/02/20  0346   WBC 10*3/mm3 7.40 5.60 2.54*   HEMOGLOBIN g/dL 13.4 13.6 13.5   MCV fL 88.7 90.6 92.6   PLATELETS 10*3/mm3 317 292 253     Results from last 7 days   Lab Units 05/05/20  0807 05/04/20  0408 05/03/20  0627 05/02/20  0346 05/01/20  0343   SODIUM mmol/L 139 137 137 138 139   POTASSIUM mmol/L 4.0 3.9 4.5 4.5 4.2   CO2 mmol/L 30.0* 28.0 27.0 26.0 27.0   CREATININE mg/dL 0.64 0.72 0.73 0.83 0.82   GLUCOSE mg/dL 100* 141* 163* 155* 108*   MAGNESIUM mg/dL 2.2  --   --  2.2 1.9   PHOSPHORUS mg/dL 2.8  --   --   --  2.7     Estimated Creatinine Clearance: 106.3 mL/min (by C-G formula based on SCr of 0.64 mg/dL).  Results from last 7 days   Lab Units 05/01/20  0343 04/29/20  1511   ALK PHOS U/L 62 65   BILIRUBIN mg/dL 0.3 0.4   ALT (SGPT) U/L 11 7   AST (SGOT) U/L 34* 25       Results from last 7 days   Lab Units 04/29/20  1638   PH, ARTERIAL pH units 7.420   PCO2, ARTERIAL mm Hg 41.5   PO2 ART mm Hg 68.3*   FIO2 % 28       Images:  Imaging Results (Last 24 Hours)     ** No results found for the last 24 hours. **              Results: Reviewed.  I reviewed the patient's new laboratory and imaging results.  I independently reviewed the patient's new images.    Medications: Reviewed.    Assessment/Plan   A / P     Ms. Aguirre is a 65yo F who is admitted for COVID-19 pneumonia. She was transferred to the ICU secondary to worsening hypoxia and fevers. She has received convalescent plasma and Actemra. She has been transitioned from HFNC to 3L.      Nutrition:   Diet Regular; Cardiac  Advance Directives:   Code Status and Medical Interventions:   Ordered at: 04/29/20 1943     Code Status:    CPR     Medical Interventions (Level of Support Prior to Arrest):    Full       Active Hospital Problems    Diagnosis   • **COVID-19 Pneumonitis   • Acute hypoxemic respiratory failure (CMS/HCC)   • Fever   •  Paroxysmal atrial fibrillation (CMS/[facility])   • Obesity (BMI 30-39.9)       Assessment / Plan:    1. Wean supplemental O2 as tolerated.   2. Continue DVT prophylaxis.   3. PT/O  4. AM labs   5. Okay to transfer to telemetry when a bed is available. May be ready for discharge soon if she does not need rehab.     High level of risk due to:  severe exacerbation of chronic illness and illness with threat to life or bodily function.    Plan of care and goals reviewed during interdisciplinary rounds.  I discussed the patient's findings and my recommendations with patient and nursing staff      Conchita Meza, DO    Intensive Care Medicine and Pulmonary Medicine

## 2020-05-07 LAB
ANION GAP SERPL CALCULATED.3IONS-SCNC: 11 MMOL/L (ref 5–15)
BUN BLD-MCNC: 16 MG/DL (ref 8–23)
BUN/CREAT SERPL: 25.8 (ref 7–25)
CALCIUM SPEC-SCNC: 8.7 MG/DL (ref 8.6–10.5)
CHLORIDE SERPL-SCNC: 98 MMOL/L (ref 98–107)
CO2 SERPL-SCNC: 26 MMOL/L (ref 22–29)
CREAT BLD-MCNC: 0.62 MG/DL (ref 0.57–1)
DEPRECATED RDW RBC AUTO: 42.8 FL (ref 37–54)
ERYTHROCYTE [DISTWIDTH] IN BLOOD BY AUTOMATED COUNT: 12.7 % (ref 12.3–15.4)
GFR SERPL CREATININE-BSD FRML MDRD: 97 ML/MIN/1.73
GLUCOSE BLD-MCNC: 97 MG/DL (ref 65–99)
HCT VFR BLD AUTO: 41.1 % (ref 34–46.6)
HGB BLD-MCNC: 13.9 G/DL (ref 12–15.9)
MAGNESIUM SERPL-MCNC: 2.6 MG/DL (ref 1.6–2.4)
MCH RBC QN AUTO: 31.7 PG (ref 26.6–33)
MCHC RBC AUTO-ENTMCNC: 33.8 G/DL (ref 31.5–35.7)
MCV RBC AUTO: 93.6 FL (ref 79–97)
PHOSPHATE SERPL-MCNC: 3.6 MG/DL (ref 2.5–4.5)
PLATELET # BLD AUTO: 283 10*3/MM3 (ref 140–450)
PMV BLD AUTO: 11.5 FL (ref 6–12)
POTASSIUM BLD-SCNC: 4.2 MMOL/L (ref 3.5–5.2)
RBC # BLD AUTO: 4.39 10*6/MM3 (ref 3.77–5.28)
SODIUM BLD-SCNC: 135 MMOL/L (ref 136–145)
WBC NRBC COR # BLD: 6.24 10*3/MM3 (ref 3.4–10.8)

## 2020-05-07 PROCEDURE — 97116 GAIT TRAINING THERAPY: CPT

## 2020-05-07 PROCEDURE — 97110 THERAPEUTIC EXERCISES: CPT

## 2020-05-07 PROCEDURE — 80048 BASIC METABOLIC PNL TOTAL CA: CPT | Performed by: INTERNAL MEDICINE

## 2020-05-07 PROCEDURE — U0002 COVID-19 LAB TEST NON-CDC: HCPCS | Performed by: INTERNAL MEDICINE

## 2020-05-07 PROCEDURE — 84100 ASSAY OF PHOSPHORUS: CPT | Performed by: INTERNAL MEDICINE

## 2020-05-07 PROCEDURE — 25010000002 ENOXAPARIN PER 10 MG: Performed by: INTERNAL MEDICINE

## 2020-05-07 PROCEDURE — 83735 ASSAY OF MAGNESIUM: CPT | Performed by: INTERNAL MEDICINE

## 2020-05-07 PROCEDURE — 99233 SBSQ HOSP IP/OBS HIGH 50: CPT | Performed by: INTERNAL MEDICINE

## 2020-05-07 PROCEDURE — 97165 OT EVAL LOW COMPLEX 30 MIN: CPT

## 2020-05-07 PROCEDURE — 85027 COMPLETE CBC AUTOMATED: CPT | Performed by: INTERNAL MEDICINE

## 2020-05-07 RX ORDER — FUROSEMIDE 20 MG/1
20 TABLET ORAL DAILY
Status: DISCONTINUED | OUTPATIENT
Start: 2020-05-07 | End: 2020-05-12 | Stop reason: HOSPADM

## 2020-05-07 RX ORDER — SPIRONOLACTONE 25 MG/1
25 TABLET ORAL DAILY
Status: DISCONTINUED | OUTPATIENT
Start: 2020-05-07 | End: 2020-05-07

## 2020-05-07 RX ADMIN — DILTIAZEM HYDROCHLORIDE 240 MG: 240 CAPSULE, COATED, EXTENDED RELEASE ORAL at 08:17

## 2020-05-07 RX ADMIN — SODIUM CHLORIDE, PRESERVATIVE FREE 10 ML: 5 INJECTION INTRAVENOUS at 08:17

## 2020-05-07 RX ADMIN — ACETAMINOPHEN 650 MG: 325 TABLET, FILM COATED ORAL at 09:03

## 2020-05-07 RX ADMIN — ENOXAPARIN SODIUM 120 MG: 120 INJECTION SUBCUTANEOUS at 08:17

## 2020-05-07 RX ADMIN — ACETAMINOPHEN 650 MG: 325 TABLET, FILM COATED ORAL at 14:32

## 2020-05-07 RX ADMIN — FUROSEMIDE 20 MG: 20 TABLET ORAL at 14:29

## 2020-05-07 RX ADMIN — ATENOLOL 25 MG: 25 TABLET ORAL at 08:17

## 2020-05-07 RX ADMIN — ENOXAPARIN SODIUM 120 MG: 120 INJECTION SUBCUTANEOUS at 21:00

## 2020-05-07 RX ADMIN — ACETAMINOPHEN 650 MG: 325 TABLET, FILM COATED ORAL at 21:04

## 2020-05-07 RX ADMIN — PRAVASTATIN SODIUM 40 MG: 40 TABLET ORAL at 21:01

## 2020-05-07 NOTE — PLAN OF CARE
Problem: Patient Care Overview  Goal: Plan of Care Review  Flowsheets (Taken 5/7/2020 1024)  Progress: improving  Plan of Care Reviewed With: patient  Outcome Summary: Pt increased ambulation distance to 20 ft with RW and CGAx2, distance limited by fatigue, weakness, dizziness and SOA. VSS on 3LO2NC. Improved activity tolerance. Will continue to progress pt as able per POC.

## 2020-05-07 NOTE — PROGRESS NOTES
[facility] Medicine Services  PROGRESS NOTE    Patient Name: Marlene Aguirre  : 1955  MRN: [id number]    Date of Admission: 2020  Primary Care Physician: Provider, No Known    Subjective   Subjective     CC:  covid pneumonitis    HPI:  Overall feeling a bit better, breathing easier but still not at baseline. No fever    Review of Systems  No headache    Objective   Objective     Vital Signs:   Temp:  [96.5 °F (35.8 °C)-98.5 °F (36.9 °C)] 97.1 °F (36.2 °C)  Heart Rate:  [60-77] 68  Resp:  [16-22] 18  BP: (103-130)/(67-84) 103/71        With patient's consent, physical exam was conducted via visual telemedicine encounter w/ direct visualization of patient through window while simultaneously talking w/ patient on phone. This is done due to isolation requirements in the interest of PPE conservation.    Physical Exam:  Constitutional:Alert, oriented x 3, nontoxic appearing, obese, normal respiratory effort  Psych:Normal/appropriate affect  HEENT:Ncat, oroph clear  Neck: neck supple, full range of motion  Neuro: Face symmetric, speech clear, equal , moves all extremities  Cardiac: Rrr; No pretibial pitting edema  Resp: symmetric chest rise and fall, normal respiratory effort at rest  GI: abd soft, obese, nontender (during self-palpation)  Skin: No extremity rash        Results Reviewed:  Results from last 7 days   Lab Units 20  0556 20  0408 20  0626   WBC 10*3/mm3 6.24 7.40 5.60   HEMOGLOBIN g/dL 13.9 13.4 13.6   HEMATOCRIT % 41.1 41.0 43.5   PLATELETS 10*3/mm3 283 317 292     Results from last 7 days   Lab Units 20  0556 20  0807 20  0408  20  0343   SODIUM mmol/L 135* 139 137   < > 139   POTASSIUM mmol/L 4.2 4.0 3.9   < > 4.2   CHLORIDE mmol/L 98 96* 96*   < > 97*   CO2 mmol/L 26.0 30.0* 28.0   < > 27.0   BUN mg/dL 16 23 28*   < > 16   CREATININE mg/dL 0.62 0.64 0.72   < > 0.82   GLUCOSE mg/dL 97 100* 141*   < > 108*   CALCIUM mg/dL  8.7 8.7 8.4*   < > 8.3*   ALT (SGPT) U/L  --   --   --   --  11   AST (SGOT) U/L  --   --   --   --  34*    < > = values in this interval not displayed.     Estimated Creatinine Clearance: 109.7 mL/min (by C-G formula based on SCr of 0.62 mg/dL).    Microbiology Results Abnormal     Procedure Component Value - Date/Time    Blood Culture - Blood, Arm, Right [[id number]] Collected:  04/29/20 1600    Lab Status:  Final result Specimen:  Blood from Arm, Right Updated:  05/04/20 1615     Blood Culture No growth at 5 days    Blood Culture - Blood, Arm, Right [[id number]] Collected:  04/29/20 1550    Lab Status:  Final result Specimen:  Blood from Arm, Right Updated:  05/04/20 1615     Blood Culture No growth at 5 days    Legionella Antigen, Urine - Urine, Urine, Clean Catch [[id number]]  (Normal) Collected:  04/30/20 0911    Lab Status:  Final result Specimen:  Urine, Clean Catch Updated:  04/30/20 1302     LEGIONELLA ANTIGEN, URINE Negative    S. Pneumo Ag Urine or CSF - Urine, Urine, Clean Catch [[id number]]  (Normal) Collected:  04/30/20 0911    Lab Status:  Final result Specimen:  Urine, Clean Catch Updated:  04/30/20 1302     Strep Pneumo Ag Negative    MRSA Screen, PCR (Inpatient) - Swab, Nares [[id number]]  (Normal) Collected:  04/29/20 2157    Lab Status:  Final result Specimen:  Swab from Nares Updated:  04/30/20 0037     MRSA PCR Negative    Narrative:       MRSA Negative    SARS-CoV-2 PCR ([place] IN-HOUSE PERFORMED), NP SWAB IN TRANSPORT MEDIA - Swab, Nasopharynx [[id number]]  (Abnormal) Collected:  04/29/20 1644    Lab Status:  Final result Specimen:  Swab from Nasopharynx Updated:  04/30/20 0003     COVID19 Detected    Respiratory Panel, PCR - Swab, Nasopharynx [[id number]]  (Normal) Collected:  04/29/20 1644    Lab Status:  Final result Specimen:  Swab from Nasopharynx Updated:  04/29/20 1802     ADENOVIRUS, PCR Not Detected     Coronavirus 229E Not Detected     Coronavirus HKU1 Not Detected     Coronavirus  NL63 Not Detected     Coronavirus OC43 Not Detected     Human Metapneumovirus Not Detected     Human Rhinovirus/Enterovirus Not Detected     Influenza B PCR Not Detected     Parainfluenza Virus 1 Not Detected     Parainfluenza Virus 2 Not Detected     Parainfluenza Virus 3 Not Detected     Parainfluenza Virus 4 Not Detected     Bordetella pertussis pcr Not Detected     Influenza A H1 2009 PCR Not Detected     Chlamydophila pneumoniae PCR Not Detected     Mycoplasma pneumo by PCR Not Detected     Influenza A PCR Not Detected     Influenza A H3 Not Detected     Influenza A H1 Not Detected     RSV, PCR Not Detected     Bordetella parapertussis PCR Not Detected    Narrative:       The coronavirus on the RVP is NOT COVID-19 and is NOT indicative of infection with COVID-19.           Imaging Results (Last 24 Hours)     ** No results found for the last 24 hours. **               I have reviewed the medications:  Scheduled Meds:  atenolol 25 mg Oral Daily   dilTIAZem  mg Oral Daily   enoxaparin 1 mg/kg Subcutaneous Q12H   pravastatin 40 mg Oral Nightly   sodium chloride 10 mL Intravenous Q12H     Continuous Infusions:   PRN Meds:.•  acetaminophen  •  albuterol sulfate HFA  •  diphenhydrAMINE  •  magnesium sulfate **OR** magnesium sulfate in D5W 1g/100mL (PREMIX) **OR** magnesium sulfate  •  potassium chloride  •  sodium chloride  •  sodium chloride  •  sodium chloride    Assessment/Plan   Assessment & Plan     Active Hospital Problems    Diagnosis  POA   • **COVID-19 Pneumonitis [U07.1, J12.89]  Yes   • Acute hypoxemic respiratory failure (CMS/HCC) [J96.01]  Yes     Priority: High   • Fever [R50.9]  Yes   • Paroxysmal atrial fibrillation (CMS/HCC) [I48.0]  Yes   • Obesity (BMI 30-39.9) [E66.9]  Yes      Resolved Hospital Problems   No resolved problems to display.        Brief Hospital Course to date:  Marlene Aguirre is a 64 y.o. female w/ hx afib/pacemaker (on diltiazem, atenolol, xarelto anticoagulation; follows  w/ Dr. Gaytan at [facility]), asthma, obesity who presented to [facility] ED on 4/29/20 with progressive cough, dyspnea, fever despite being placed on course of zithromax in outpatient setting. Upon arrival patient was hypoxic & febrile.Cxr showed patchy air space disease. D-dimer was mildly elevated, V/Q scan was low probability for pulmonary embolism. covid-19 pcr testing was positive. In the hours after admission patient's oxygen requirements increased and patient was transferred to ICU where patient received actemra and convalescent plasma. Patient required up to 80% fio2 on hi-flow cannula but oxygen requirements have improved and was transferred out to telemetry/covid unit on 5/7/20 with 3Lnc requirements.      *Covid Pneumonitis   -s/p serum convalescent plasma therapy on 4/30/20  *s/p Cytokine Storm (due to above), improving   -s/c actemra on 4/29/20  *Acute hypoxic respiratory failure (due to above), improving   -previously required up to 80% fio2 on hi flow cannula  *Hx Paroxysmal Afib/Hx pacemaker  *Obesity  *hx asthma  *Patient claims history of PCN allergy (questionable throat swelling); doxycycline intolerance    Plan:    -continue to wean oxygen as tolerates (currently on 3.5liters)  -discussed w/ Dr. Horn of Infectious Disease. Sending covid-19 pcr lexar test today (last + test was 4/29)  -continue dilt, atenlolol; xarelto on hold in favor or therapeutic lovenox for now; anticipate transition back to xarelto at discharge  -add lasix 20mg po daily (was on aldactone prior to admission)  -pt/ot consulted    Am labs: bmp    -dispo: PT /OT evaluation pending; if patient improving clinically and if pt/ot recommends home health (rather than inpatient rehab), then will discuss possibly setting up with current health monitoring device tomorrow (since tomorrow is Friday). Will need to see stable oxygen requirements, vital signs, & labs prior to discharge. Possibly medically ready for discharge in 2-3  days    -I called patient's pcp (Dr. Ammy Leblanc m.d. @ [facility]) on 5/7/20 to update regarding patient's hospital course and she appreciated the call. If patient is to be discharged home, then Dr. Leblanc asked that we give her a call on her cell phone ([phone]), even if it's a weekend, to set up outpatient follow up within a week.    *45 min spent on patient care. >50% spent counseling patient, discussing w/ family, and discussing w/ patient's pcp to coordinate care    DVT Prophylaxis:  Sq therapeutic lovenox    Daily Care Communication  Due to current limited visitation policies, an attempt will be made daily to update patient's identified best point-of-contact(s)   Contact: Cecilio Jones   Relation: son   Type of communication (phone or televideo): phone   Time of communication: 13:46   Notes (if applicable): appreciated the call     Disposition: I expect the patient to be discharged in 2-3 days    CODE STATUS:   Code Status and Medical Interventions:   Ordered at: 04/29/20 1943     Code Status:    CPR     Medical Interventions (Level of Support Prior to Arrest):    Full         Electronically signed by Rishi Gill MD, 05/07/20, 07:56.

## 2020-05-07 NOTE — PROGRESS NOTES
INFECTIOUS DISEASE CONSULT/INITIAL HOSPITAL VISIT    Marlene Aguirre  1955  [id number]    Date of Consult: 5/7/2020    Admission Date: 4/29/2020      Requesting Provider: Rishi Gill MD  Evaluating Physician: Braxton Horn MD    Reason for Consultation: COVID-19    History of present illness:    Patient is a 64 y.o. female who has intermittent asthma who has history of tobacco use and quit 3 years ago has multiple family members living with her.  Apparently patient developed headache, diarrhea, dry cough was given a Z-Jules by primary care physician on April 27,020 patient had progressive cough and fever and hypoxia on April 29.    Apparently a therapist that was coming to the house tested positive for COVID prior to patient's symptoms.    Patient admitted to the floor but because of worsening hypoxia and fevers was transferred to the intensive care unit.  Patient given 1 dose of 800 mg of Actemra last night    Patient is stable hemodynamically and on oxygen by nasal cannula currently her sats on 6 L are 91%    CT scan performed high probability for COVID-19 with bilateral groundglass opacities    COVID PCR positive    5/1/2020 patient received 2 units of convalescent serum plasma yesterday is received IV Lasix as well remains on 6 L with O2 sats between 88% and 90%.  Patient is a good spirits is sitting in chair is able to order food for nutrition per telephone call.  Per nursing staff had liquid bowel movements x3 yesterday is otherwise afebrile and hemodynamically stable    Review of systems remark for diarrhea no fevers    5/2/2020; patient required 8 L of oxygen by nasal cannula last night is otherwise hemodynamically stable.  Patient is eating small amounts reports that she ate for sherbet yesterday with the food otherwise states like cardboard.    Patient has intermittent cough which is productive.  She denies fevers rash sore throat ;    Patient was talked to by telephone and inspected  through the window.    5/3/2020; patient is doing fair he is on high flow nasal cannula now with better O2 sats but is on increasing amounts of oxygen.  Patient is afebrile and hemodynamically stable patient is being diuresed by pulmonary.  Does not report rash or sore throat patient spoke to over the telephone and visualized through the window    5/4/20; doing well; reports nose is stopped up. no events overnight; afebrile, hemodynamically stable; on high flow nasal cannula this is being weaned; denies chest pain, but does have intermittent coughing. Eating better.   Patient inspected from window and talked to by telephone    5/5/2020 patient is on less oxygen has been transitioned from high flow nasal cannula to regular nasal cannula on 6 L currently patient is resting quietly; reports congestion, abdominal cramping, diarrhea.   5/6/2020 patient is on 3 L oxygen is afebrile and hemodynamically stable to be transferred to floor good spirits no complaints today    5/8/20; doing well; no events overnight; no rash, sore throat, diarrhea; transferred to floor; requiring 3 liters o2; glad to be out of ICU; breathing is ok; still has cough, feels a little heaviness in chest, and dyspnea; feels better overall;     Past Medical History:   Diagnosis Date   • Asthma    • Atrial fibrillation (CMS/HCC)    • Coronary artery disease    • Hypertension        Past Surgical History:   Procedure Laterality Date   • BILATERAL BREAST REDUCTION     • BRAIN SURGERY     • DILATATION AND CURETTAGE     • PACEMAKER IMPLANTATION         History reviewed. No pertinent family history.    Social History     Socioeconomic History   • Marital status:      Spouse name: Not on file   • Number of children: Not on file   • Years of education: Not on file   • Highest education level: Not on file   Tobacco Use   • Smoking status: Former Smoker   • Smokeless tobacco: Never Used   • Tobacco comment: QUIT 3 YEARS AGO   Substance and Sexual Activity    • Alcohol use: Never     Frequency: Never   • Drug use: Never   • Sexual activity: Defer       Allergies   Allergen Reactions   • Contrast Dye Swelling     FACE, NOSE AND NECK SWELLING AS WELL AS SANDI    • Doxycycline Unknown - Low Severity     PT UNSURE OF REACTION    • Flexeril [Cyclobenzaprine] Hives   • Keflex [Cephalexin] Unknown - Low Severity     PT DOES NOT REMEMBER    • Kenalog [Triamcinolone Acetonide] Itching   • Penicillins Hives   • Sulfa Antibiotics Itching   • Tetracyclines & Related Unknown - Low Severity     PT UNSURE OF REACTION   • Levaquin [Levofloxacin] Rash          Medication:    Current Facility-Administered Medications:   •  acetaminophen (TYLENOL) tablet 650 mg, 650 mg, Oral, Q6H PRN, Case, Conchita V., DO, 650 mg at 05/07/20 0903  •  albuterol sulfate HFA (PROVENTIL HFA;VENTOLIN HFA;PROAIR HFA) inhaler 2 puff, 2 puff, Inhalation, Q4H PRN, Case, Conchita V., DO, 2 puff at 04/30/20 1612  •  atenolol (TENORMIN) tablet 25 mg, 25 mg, Oral, Daily, Case, Conchita V., DO, 25 mg at 05/07/20 0817  •  dilTIAZem CD (CARDIZEM CD) 24 hr capsule 240 mg, 240 mg, Oral, Daily, Case, Conchita V., DO, 240 mg at 05/07/20 0817  •  diphenhydrAMINE (BENADRYL) capsule 50 mg, 50 mg, Oral, BID PRN, Case, Conchita V., DO, 50 mg at 05/02/20 1607  •  enoxaparin (LOVENOX) syringe 120 mg, 1 mg/kg, Subcutaneous, Q12H, Case, Conchita V., DO, 120 mg at 05/07/20 0817  •  magnesium sulfate 4 gram infusion - Mg less than or equal to 1mg/dL, 4 g, Intravenous, PRN **OR** magnesium sulfate 3 gram infusion (1gm x 3) - Mg 1.1 - 1.5 mg/dL, 1 g, Intravenous, PRN **OR** Magnesium Sulfate 2 gram infusion- Mg 1.6 - 1.9 mg/dL, 2 g, Intravenous, PRN, Case, Conchita V., DO, Last Rate: 25 mL/hr at 05/01/20 0623, 2 g at 05/01/20 0623  •  potassium chloride (MICRO-K) CR capsule 40 mEq, 40 mEq, Oral, PRN, Case, Conchita V., DO, 40 mEq at 04/30/20 1221  •  pravastatin (PRAVACHOL) tablet 40 mg, 40 mg, Oral, Nightly, Case, Conchita V., DO, 40 mg at  20  •  sodium chloride 0.9 % flush 10 mL, 10 mL, Intravenous, PRN, Case, Conchita V., DO  •  sodium chloride 0.9 % flush 10 mL, 10 mL, Intravenous, Q12H, Case, Conchita V., DO, 10 mL at 20 0817  •  sodium chloride 0.9 % flush 10 mL, 10 mL, Intravenous, PRN, Case, Conchita V., DO, 10 mL at 20 0001  •  sodium chloride nasal spray 2 spray, 2 spray, Each Nare, PRN, Case, Conchita V., DO, 2 spray at 20    Antibiotics:  Anti-Infectives (From admission, onward)    Ordered     Dose/Rate Route Frequency Start Stop    20  vancomycin 2250 mg/500 mL 0.9% NS IVPB ([facility])     Ordering Provider:  Sondra Worrell RPH    20 mg/kg × 118 kg  over 150 Minutes Intravenous Once 20 0022    20  aztreonam (AZACTAM) 1 g in 50 mL NS IVPB     Ordering Provider:  Sondra Worrell RPH    1 g  over 30 Minutes Intravenous Once 20 0200            Review of Systems:  See HPI      Physical Exam:   Vital Signs  Temp (24hrs), Av.8 °F (36.6 °C), Min:96.5 °F (35.8 °C), Max:98.5 °F (36.9 °C)    Temp  Min: 96.5 °F (35.8 °C)  Max: 98.5 °F (36.9 °C)  BP  Min: 103/71  Max: 130/77  Pulse  Min: 60  Max: 77  Resp  Min: 16  Max: 20  SpO2  Min: 87 %  Max: 97 %  Patient seen through window and talked to on the telephone  GENERAL: Resting quietly in bed is on  oxygen by nasal cannula  HEENT: Normocephalic, atraumatic.  EOMI no external oral lesions    HEART: Sinus rhythm by telemetry  LUNGS: no respiratory distress  ABDOMEN: Bowel sounds were present.  No obvious abdominal distention on exam  Skin abrasions right hand make thumbs up well visualized through the window      NEURO: Oriented to PPT.  Able to have an appropriate conversation  PSYCHIATRIC:  Normal affect    Laboratory Data    Results from last 7 days   Lab Units 20  0556 20  0408 20  0626   WBC 10*3/mm3 6.24 7.40 5.60   HEMOGLOBIN g/dL 13.9 13.4 13.6   HEMATOCRIT % 41.1 41.0 43.5   PLATELETS 10*3/mm3  283 317 292     Results from last 7 days   Lab Units 05/07/20  0556   SODIUM mmol/L 135*   POTASSIUM mmol/L 4.2   CHLORIDE mmol/L 98   CO2 mmol/L 26.0   BUN mg/dL 16   CREATININE mg/dL 0.62   GLUCOSE mg/dL 97   CALCIUM mg/dL 8.7     Results from last 7 days   Lab Units 05/01/20  0343   ALK PHOS U/L 62   BILIRUBIN mg/dL 0.3   ALT (SGPT) U/L 11   AST (SGOT) U/L 34*         Results from last 7 days   Lab Units 05/04/20  0408   CRP mg/dL 0.66*                 Estimated Creatinine Clearance: 109.7 mL/min (by C-G formula based on SCr of 0.62 mg/dL).      Microbiology:  No results found for: ACANTHNAEG, AFBCX, BPERTUSSISCX, BLOODCX  No results found for: BCIDPCR, CXREFLEX, CSFCX, CULTURETIS  No results found for: CULTURES, HSVCX, URCX  No results found for: EYECULTURE, GCCX, LABHSV  No results found for: LEGIONELLA, MRSACX, MUMPSCX, MYCOPLASCX  No results found for: NOCARDIACX, STOOLCX  No results found for: THROATCX, UNSTIMCULT, URINECX, CULTURE, VZVCULTUR  No results found for: VIRALCULTU, WOUNDCX        Radiology:  Imaging Results (Last 72 Hours)     ** No results found for the last 72 hours. **            Impression:   CO VID 19 pneumonitis  Hypoxia  Acute respiratory failure improving  Probable cytokine storm manifested by elevated ferritin elevated LDH, hypoxia  Penicillin allergy; questionable throat swelling  Doxycycline intolerance     Patient has received Actemra on 4/29  Received 2 units of serum convalescent plasma therapy on 4/30    PLAN/RECOMMENDATIONS:   Thank you for asking us to see Marlene Aguirre, I recommend the following:      Agree with Lovenox for DVT prophylaxis  Continue pravastatin for cardioprotection        Cytokine storm appears to be resolving    IL-6 level is 130 on 4/30/2020 prior to dose of Actemra    5/6/20 ferritin and LDH downtrending        Continue supplemental oxygen    Discussed with Dr. Erik Gill agree with PT and OT unclear patient will require rehabilitation at [facility] or  if can go directly home with oxygen would monitor for an additional 1 to 2 days      Continue supportive care    As suggested by Dr. Gill will repeat nasopharyngeal PCR for COVID-19; while expect this to stay positive would be interesting for placement and isolation purposes if patient converted to negative PCR test    Braxton Horn MD  5/7/2020  13:00

## 2020-05-07 NOTE — PLAN OF CARE
Problem: Patient Care Overview  Goal: Plan of Care Review  Outcome: Ongoing (interventions implemented as appropriate)  Flowsheets  Taken 5/7/2020 0523  Progress: improving  Outcome Summary: VSS, A-Paced on the monitor, 3LNC. No complaints this shift. Will continue to monitor.  Taken 5/6/2020 2047  Plan of Care Reviewed With: patient

## 2020-05-07 NOTE — THERAPY TREATMENT NOTE
Patient Name: Marlene Aguirre  : 1955    MRN: [id number]                              Today's Date: 2020       Admit Date: 2020    Visit Dx:     ICD-10-CM ICD-9-CM   1. Acute hypoxemic respiratory failure (CMS/HCC) J96.01 518.81   2. COVID-19 virus infection U07.1      Patient Active Problem List   Diagnosis   • Acute hypoxemic respiratory failure (CMS/HCC)   • COVID-19 Pneumonitis   • Fever   • Paroxysmal atrial fibrillation (CMS/HCC)   • Chronic anticoagulation   • History of pacemaker   • Multiple allergies (ct scan iv contrast, pcn, keflex, quinalones, tetracyclines, kenalog)   • Obesity (BMI 30-39.9)   • Asthma     Past Medical History:   Diagnosis Date   • Asthma    • Atrial fibrillation (CMS/HCC)    • Coronary artery disease    • Hypertension      Past Surgical History:   Procedure Laterality Date   • BILATERAL BREAST REDUCTION     • BRAIN SURGERY     • DILATATION AND CURETTAGE     • PACEMAKER IMPLANTATION       General Information     Row Name 20 1015          PT Evaluation Time/Intention    Document Type  therapy note (daily note)  -VG     Mode of Treatment  individual therapy;physical therapy  -VG     Row Name 20 1015          General Information    Existing Precautions/Restrictions  fall;oxygen therapy device and L/min  -VG     Row Name 20 1015          Cognitive Assessment/Intervention- PT/OT    Orientation Status (Cognition)  oriented x 3  -VG       User Key  (r) = Recorded By, (t) = Taken By, (c) = Cosigned By    Initials Name Provider Type    VG Elizabeth Barton, PT Physical Therapist        Mobility     Row Name 20 1016          Bed Mobility Assessment/Treatment    Comment (Bed Mobility)  [place] [facility] [place]  -VG     Row Name 20 1016          Transfer Assessment/Treatment    Comment (Transfers)  Cues for hand placement and sequencing.  -VG     Row Name 20 1016          Sit-Stand Transfer    Sit-Stand [place] (Transfers)  contact guard;2  person assist  -VG     Assistive Device (Sit-Stand Transfers)  walker, front-wheeled  -VG     Row Name 05/07/20 1016          Gait/Stairs Assessment/Training    [place] Level (Gait)  contact guard;verbal cues  -VG     Assistive Device (Gait)  walker, front-wheeled  -VG     Distance in Feet (Gait)  20  -VG     Deviations/Abnormal Patterns (Gait)  nella decreased;festinating/shuffling;gait speed decreased  -VG     Bilateral Gait Deviations  forward flexed posture  -VG     Comment (Gait/Stairs)  Distance limited by fatigue, weakness, SOA, and dizziness. VSS on 3LO2NC. Improved endurance. Cues for upright posture and AD management.  -VG       User Key  (r) = Recorded By, (t) = Taken By, (c) = Cosigned By    Initials Name Provider Type    Elizabeth Prescott, PT Physical Therapist        Obj/Interventions     Row Name 05/07/20 1022          Therapeutic Exercise    Lower Extremity (Therapeutic Exercise)  LAQ (long arc quad), bilateral;marching while seated  -VG     Lower Extremity Range of Motion (Therapeutic Exercise)  hip abduction/adduction, bilateral;ankle dorsiflexion/plantar flexion, bilateral  -VG     Exercise Type (Therapeutic Exercise)  AROM (active range of motion)  -VG     Position (Therapeutic Exercise)  seated  -VG     Sets/Reps (Therapeutic Exercise)  10x  -VG     Comment (Therapeutic Exercise)  Cues for technique.  -VG       User Key  (r) = Recorded By, (t) = Taken By, (c) = Cosigned By    Initials Name Provider Type    Elizabeth Prescott, PT Physical Therapist        Goals/Plan    No documentation.       Clinical Impression     Row Name 05/07/20 1024          Pain Scale: Numbers Pre/Post-Treatment    Pain Scale: Numbers, Pretreatment  0/10 - no pain  -VG     Pain Scale: Numbers, Post-Treatment  0/10 - no pain  -VG     Row Name 05/07/20 1024          Plan of Care Review    Plan of Care Reviewed With  patient  -VG     Progress  improving  -VG     Outcome Summary  Pt increased ambulation distance to  20 ft with RW and CGAx2, distance limited by fatigue, weakness, dizziness and SOA. VSS on 3LO2NC. Improved activity tolerance. Will continue to progress pt as able per POC.  -VG     Row Name 05/07/20 1024          Vital Signs    Post SpO2 (%)  93  -VG     O2 Delivery Post Treatment  supplemental O2  -VG     Post Patient Position  Sitting  -VG     Row Name 05/07/20 1024          Positioning and Restraints    Pre-Treatment Position  bedside commode  -VG     Post Treatment Position  chair  -VG     In Chair  reclined;encouraged to call for assist;exit alarm on;waffle cushion;legs elevated;heels elevated  -VG       User Key  (r) = Recorded By, (t) = Taken By, (c) = Cosigned By    Initials Name Provider Type    VG Elizabeth Barton, PT Physical Therapist        Outcome Measures     Row Name 05/07/20 1027          How much help from another person do you currently need...    Turning from your back to your side while in flat bed without using bedrails?  4  -VG     Moving from lying on back to sitting on the side of a flat bed without bedrails?  3  -VG     Moving to and from a bed to a chair (including a wheelchair)?  3  -VG     Standing up from a chair using your arms (e.g., wheelchair, bedside chair)?  3  -VG     Climbing 3-5 steps with a railing?  1  -VG     To walk in hospital room?  2  -VG     AM-PAC 6 Clicks Score (PT)  16  -VG     Row Name 05/07/20 1027          Functional Assessment    Outcome Measure Options  AM-PAC 6 Clicks Basic Mobility (PT)  -VG       User Key  (r) = Recorded By, (t) = Taken By, (c) = Cosigned By    Initials Name Provider Type    VG Elizabeth Barton, PT Physical Therapist        Physical Therapy Education                 Title: PT OT SLP Therapies (In Progress)     Topic: Physical Therapy (Done)     Point: Mobility training (Done)     Description:   Instruct learner(s) on safety and technique for assisting patient out of bed, chair or wheelchair.  Instruct in the proper use of assistive devices,  such as walker, crutches, cane or brace.              Patient Friendly Description:   It's important to get you on your feet again, but we need to do so in a way that is safe for you. Falling has serious consequences, and your personal safety is the most important thing of all.        When it's time to get out of bed, one of us or a family member will sit next to you on the bed to give you support.     If your doctor or nurse tells you to use a walker, crutches, a cane, or a brace, be sure you use it every time you get out of bed, even if you think you don't need it.    Learning Progress Summary           Patient Acceptance, E, VU by  at 5/7/2020 1027    Acceptance, E,D, VU,NR by  at 5/6/2020 1631                   Point: Home exercise program (Done)     Description:   Instruct learner(s) on appropriate technique for monitoring, assisting and/or progressing patient with therapeutic exercises and activities.              Learning Progress Summary           Patient Acceptance, E, VU by  at 5/7/2020 1027    Acceptance, E,D, VU,NR by  at 5/6/2020 1631                   Point: Body mechanics (Done)     Description:   Instruct learner(s) on proper positioning and spine alignment for patient and/or caregiver during mobility tasks and/or exercises.              Learning Progress Summary           Patient Acceptance, E, VU by  at 5/7/2020 1027    Acceptance, E,D, VU,NR by  at 5/6/2020 1631                   Point: Precautions (Done)     Description:   Instruct learner(s) on prescribed precautions during mobility and gait tasks              Learning Progress Summary           Patient Acceptance, E, VU by  at 5/7/2020 1027    Acceptance, E,D, VU,NR by  at 5/6/2020 1631                               User Key     Initials Effective Dates Name Provider Type Discipline     06/19/15 -  Monica Bernal, PT Physical Therapist PT     05/29/18 -  Elizabeth Barton, PT Physical Therapist PT              PT  Recommendation and Plan     Outcome Summary/Treatment Plan (PT)  Anticipated Discharge Disposition (PT): home with assist, home with home health  Plan of Care Reviewed With: patient  Progress: improving  Outcome Summary: Pt increased ambulation distance to 20 ft with RW and CGAx2, distance limited by fatigue, weakness, dizziness and SOA. VSS on 3LO2NC. Improved activity tolerance. Will continue to progress pt as able per POC.     Time Calculation:   PT Charges     Row Name 05/07/20 0917             Time Calculation    Start Time  0917  -VG      PT Received On  05/07/20  -VG      PT Goal Re-Cert Due Date  05/16/20  -VG         Time Calculation- PT    Total Timed Code Minutes- PT  23 minute(s)  -VG         Timed Charges    43748 - PT Therapeutic Exercise Minutes  10  -VG      43041 - Gait Training Minutes   13  -VG        User Key  (r) = Recorded By, (t) = Taken By, (c) = Cosigned By    Initials Name Provider Type    VG Elizabeth Barton, PT Physical Therapist        Therapy Charges for Today     Code Description Service Date Service Provider Modifiers Qty    16340778625 HC PT THER PROC EA 15 MIN 5/7/2020 Elizabeth Barton, PT GP 1    74844309906 HC GAIT TRAINING EA 15 MIN 5/7/2020 Elizabeth Barton, PT GP 1          PT G-Codes  Outcome Measure Options: AM-PAC 6 Clicks Basic Mobility (PT)  AM-PAC 6 Clicks Score (PT): 16  AM-PAC 6 Clicks Score (OT): 14    Vidhi Barton PT  5/7/2020

## 2020-05-07 NOTE — PLAN OF CARE
Patient up in chair today most of shift. Re-swabbed for covid19. Will continue current plan of care

## 2020-05-07 NOTE — THERAPY EVALUATION
Acute Care - Occupational Therapy Initial Evaluation  [facility]     Patient Name: Marlene Aguirre  : 1955  MRN: [id number]  Today's Date: 2020             Admit Date: 2020       ICD-10-CM ICD-9-CM   1. Acute hypoxemic respiratory failure (CMS/HCC) J96.01 518.81   2. COVID-19 virus infection U07.1      Patient Active Problem List   Diagnosis   • Acute hypoxemic respiratory failure (CMS/HCC)   • COVID-19 Pneumonitis   • Fever   • Paroxysmal atrial fibrillation (CMS/HCC)   • Chronic anticoagulation   • History of pacemaker   • Multiple allergies (ct scan iv contrast, pcn, keflex, quinalones, tetracyclines, kenalog)   • Obesity (BMI 30-39.9)   • Asthma     Past Medical History:   Diagnosis Date   • Asthma    • Atrial fibrillation (CMS/HCC)    • Coronary artery disease    • Hypertension      Past Surgical History:   Procedure Laterality Date   • BILATERAL BREAST REDUCTION     • BRAIN SURGERY     • DILATATION AND CURETTAGE     • PACEMAKER IMPLANTATION            OT ASSESSMENT FLOWSHEET (last 12 hours)      Occupational Therapy Evaluation     Row Name 20 0904                   OT Evaluation Time/Intention    Subjective Information  no complaints  -CL        Document Type  evaluation  -CL        Mode of Treatment  occupational therapy  -CL        Patient Effort  good  -CL        Symptoms Noted During/After Treatment  fatigue  -CL           General Information    Patient Profile Reviewed?  yes  -CL        Prior Level of Function  independent:;all household mobility;transfer;ADL's;dressing;bathing  -CL        Existing Precautions/Restrictions  fall;oxygen therapy device and L/min  -CL        Limitations/Impairments  safety/cognitive  -CL           Relationship/Environment    Lives With  spouse;child(teresa), adult;child(teresa), dependent;grandchild(teresa) caregiver for disabled son  -CL           Resource/Environmental Concerns    Current Living Arrangements  home/apartment/condo  -CL           Home Main  Entrance    Number of Stairs, Main Entrance  none  -CL           Cognitive Assessment/Interventions    Additional Documentation  Cognitive Assessment/Intervention (Group)  -CL           Cognitive Assessment/Intervention- PT/OT    Affect/Mental Status (Cognitive)  WFL  -CL        Orientation Status (Cognition)  oriented x 4  -CL        Follows Commands (Cognition)  WFL  -CL        Cognitive Function (Cognitive)  WFL  -CL           Bed Mobility Assessment/Treatment    Comment (Bed Mobility)  Pt received on BSC  -CL           Functional Mobility    Functional Mobility- Comment  Defer to PT  -CL           Transfer Assessment/Treatment    Transfer Assessment/Treatment  sit-stand transfer;stand-sit transfer;toilet transfer  -CL           Sit-Stand Transfer    Sit-Stand [place] (Transfers)  minimum assist (75% patient effort);verbal cues  -CL        Assistive Device (Sit-Stand Transfers)  walker, front-wheeled  -CL           Stand-Sit Transfer    Stand-Sit [place] (Transfers)  minimum assist (75% patient effort);verbal cues  -CL        Assistive Device (Stand-Sit Transfers)  walker, front-wheeled  -CL           Toilet Transfer    Type (Toilet Transfer)  sit-stand;stand-sit  -CL        [place] Level (Toilet Transfer)  minimum assist (75% patient effort);verbal cues  -CL        Assistive Device (Toilet Transfer)  commode, bedside without drop arms;walker, front-wheeled  -CL           ADL Assessment/Intervention    BADL Assessment/Intervention  lower body dressing;toileting  -CL           Lower Body Dressing Assessment/Training    Lower Body Dressing [place] Level  don;socks;dependent (less than 25% patient effort)  -CL        Lower Body Dressing Position  supported sitting  -CL           Toileting Assessment/Training    [place] Level (Toileting)  adjust/manage clothing;perform perineal hygiene;dependent (less than 25% patient effort);verbal cues  -CL        Toileting Position  supported  sitting;supported standing  -CL           General ROM    GENERAL ROM COMMENTS  BUE grossly WFL  -CL           MMT (Manual Muscle Testing)    General MMT Comments  BUE grossly 3+/5  -CL           Motor Assessment/Interventions    Additional Documentation  Balance (Group);Therapeutic Exercise (Group)  -CL           Balance    Balance  static sitting balance;static standing balance  -CL           Static Sitting Balance    Level of [place] (Unsupported Sitting, Static Balance)  supervision  -CL        Sitting Position (Unsupported Sitting, Static Balance)  sitting in chair  -CL           Static Standing Balance    Level of [place] (Supported Standing, Static Balance)  contact guard assist  -CL        Assistive Device Utilized (Supported Standing, Static Balance)  walker, rolling  -CL           Sensory Assessment/Intervention    Sensory General Assessment  no sensation deficits identified  -CL           Positioning and Restraints    Pre-Treatment Position  bedside commode  -CL        Post Treatment Position  chair  -CL        In Chair  notified nsg;reclined;call light within reach;encouraged to call for assist;exit alarm on;with PT;RUE elevated;LUE elevated;waffle cushion;legs elevated;heels elevated  -CL           Pain Scale: Numbers Pre/Post-Treatment    Pain Scale: Numbers, Pretreatment  0/10 - no pain  -CL        Pain Scale: Numbers, Post-Treatment  0/10 - no pain  -CL           Clinical Impression (OT)    OT Diagnosis  Decreased independence in ADLs.   -CL        Patient/Family Goals Statement (OT Eval)  Return to PLOF  -CL        Criteria for Skilled Therapeutic Interventions Met (OT Eval)  yes;treatment indicated  -CL        Rehab Potential (OT Eval)  good, to achieve stated therapy goals  -CL        Therapy Frequency (OT Eval)  daily  -CL        Anticipated Equipment Needs at Discharge (OT)  -- TBA further  -CL        Anticipated Discharge Disposition (OT)  home with 24/7 care;home with home health  -CL            Vital Signs    Pre Systolic BP Rehab  -- VSS  -CL           OT Goals    Transfer Goal Selection (OT)  transfer, OT goal 1  -CL        Dressing Goal Selection (OT)  dressing, OT goal 1  -CL        Toileting Goal Selection (OT)  toileting, OT goal 1  -CL        Activity Tolerance Goal Selection (OT)  activity tolerance, OT goal 1  -CL        Additional Documentation  Activity Tolerance Goal Selection (OT) (Row)  -CL           Transfer Goal 1 (OT)    Activity/Assistive Device (Transfer Goal 1, OT)  sit-to-stand/stand-to-sit;toilet  -CL        [place] Level/Cues Needed (Transfer Goal 1, OT)  supervision required  -CL        Time Frame (Transfer Goal 1, OT)  long term goal (LTG);10 days  -CL        Progress/Outcome (Transfer Goal 1, OT)  goal ongoing  -CL           Dressing Goal 1 (OT)    Activity/Assistive Device (Dressing Goal 1, OT)  lower body dressing don/doff socks w/ AAD  -CL        [place]/Cues Needed (Dressing Goal 1, OT)  minimum assist (75% or more patient effort);verbal cues required  -CL        Time Frame (Dressing Goal 1, OT)  long term goal (LTG);10 days  -CL        Progress/Outcome (Dressing Goal 1, OT)  goal ongoing  -CL           Toileting Goal 1 (OT)    Activity/Device (Toileting Goal 1, OT)  adjust/manage clothing;perform perineal hygiene  -CL        [place] Level/Cues Needed (Toileting Goal 1, OT)  minimum assist (75% or more patient effort);verbal cues required  -CL        Time Frame (Toileting Goal 1, OT)  long term goal (LTG);10 days  -CL        Progress/Outcome (Toileting Goal 1, OT)  goal ongoing  -CL            Activity Tolerance Goal 1 (OT)    Activity Tolerance Goal 1 (OT)  Pt will tolerate 15 mins of functional task performance w/ 1 rest break.   -CL        Activity Level (Endurance Goal 1, OT)  15 min activity  -CL        Time Frame (Activity Tolerance Goal 1, OT)  long term goal (LTG);10 days  -CL        Progress/Outcome (Activity Tolerance Goal 1, OT)  goal  ongoing  -          User Key  (r) = Recorded By, (t) = Taken By, (c) = Cosigned By    Initials Name Effective Dates     Shadia Gama OT 04/03/18 -          Occupational Therapy Education                 Title: PT OT SLP Therapies (In Progress)     Topic: Occupational Therapy (In Progress)     Point: ADL training (In Progress)     Description:   Instruct learner(s) on proper safety adaptation and remediation techniques during self care or transfers.   Instruct in proper use of assistive devices.              Learning Progress Summary           Patient Acceptance, E, NR by CL at 5/7/2020 0904                   Point: Home exercise program (Not Started)     Description:   Instruct learner(s) on appropriate technique for monitoring, assisting and/or progressing therapeutic exercises/activities.              Learner Progress:   Not documented in this visit.          Point: Precautions (In Progress)     Description:   Instruct learner(s) on prescribed precautions during self-care and functional transfers.              Learning Progress Summary           Patient Acceptance, E, NR by CL at 5/7/2020 0904                   Point: Body mechanics (In Progress)     Description:   Instruct learner(s) on proper positioning and spine alignment during self-care, functional mobility activities and/or exercises.              Learning Progress Summary           Patient Acceptance, E, NR by CL at 5/7/2020 0904                               User Key     Initials Effective Dates Name Provider Type Discipline     04/03/18 -  Shadia Gama OT Occupational Therapist OT                  OT Recommendation and Plan  Outcome Summary/Treatment Plan (OT)  Anticipated Equipment Needs at Discharge (OT): (TBA further)  Anticipated Discharge Disposition (OT): home with 24/7 care, home with home health  Therapy Frequency (OT Eval): daily  Plan of Care Review  Plan of Care Reviewed With: patient  Plan of Care Reviewed With: patient  Outcome  Summary: OT completed a brief chart review. Pt Min A for t/fs and Dep for LB ADLs. Pt limited d/t c/o dizziness and and poor activity tolerance. Recommend cont skilled IPOT POC. Recommend pt DC home w/ assist and HH OT/PT services.    Outcome Measures     Row Name 05/07/20 0904             How much help from another is currently needed...    Putting on and taking off regular lower body clothing?  2  -CL      Bathing (including washing, rinsing, and drying)  2  -CL      Toileting (which includes using toilet bed pan or urinal)  1  -CL      Putting on and taking off regular upper body clothing  2  -CL      Taking care of personal grooming (such as brushing teeth)  3  -CL      Eating meals  4  -CL      AM-PAC 6 Clicks Score (OT)  14  -CL         Functional Assessment    Outcome Measure Options  AM-PAC 6 Clicks Daily Activity (OT)  -CL        User Key  (r) = Recorded By, (t) = Taken By, (c) = Cosigned By    Initials Name Provider Type    Shadia Hayes OT Occupational Therapist          Time Calculation:   Time Calculation- OT     Row Name 05/07/20 0904             Time Calculation- OT    OT Start Time  0904  -CL      OT Received On  05/07/20  -CL      OT Goal Re-Cert Due Date  05/17/20  -CL        User Key  (r) = Recorded By, (t) = Taken By, (c) = Cosigned By    Initials Name Provider Type    Shadia Hayes OT Occupational Therapist        Therapy Charges for Today     Code Description Service Date Service Provider Modifiers Qty    [id number] HC OT EVAL LOW COMPLEXITY 4 5/7/2020 Shadia Gama OT GO 1               Shadia Gama OT  5/7/2020

## 2020-05-07 NOTE — PLAN OF CARE
Problem: Patient Care Overview  Goal: Plan of Care Review  Outcome: Ongoing (interventions implemented as appropriate)  Flowsheets (Taken 5/7/2020 0904)  Progress: improving  Plan of Care Reviewed With: patient  Outcome Summary: OT completed a brief chart review. Pt Min A for t/fs and Dep for LB ADLs. Pt limited d/t c/o dizziness and and poor activity tolerance. Recommend cont skilled IPOT POC. Recommend pt DC home w/ assist and HH OT/PT services.

## 2020-05-08 ENCOUNTER — APPOINTMENT (OUTPATIENT)
Dept: GENERAL RADIOLOGY | Facility: HOSPITAL | Age: 65
End: 2020-05-08

## 2020-05-08 ENCOUNTER — APPOINTMENT (OUTPATIENT)
Dept: CARDIOLOGY | Facility: HOSPITAL | Age: 65
End: 2020-05-08

## 2020-05-08 LAB
ALBUMIN SERPL-MCNC: 3.3 G/DL (ref 3.5–5.2)
ALBUMIN/GLOB SERPL: 1.2 G/DL
ALP SERPL-CCNC: 43 U/L (ref 39–117)
ALT SERPL W P-5'-P-CCNC: 16 U/L (ref 1–33)
ANION GAP SERPL CALCULATED.3IONS-SCNC: 13 MMOL/L (ref 5–15)
ASCENDING AORTA: 3.1 CM
AST SERPL-CCNC: 23 U/L (ref 1–32)
BASOPHILS # BLD AUTO: 0.02 10*3/MM3 (ref 0–0.2)
BASOPHILS NFR BLD AUTO: 0.3 % (ref 0–1.5)
BH CV ECHO MEAS - AO MAX PG (FULL): 5.5 MMHG
BH CV ECHO MEAS - AO MAX PG: 12.4 MMHG
BH CV ECHO MEAS - AO MEAN PG (FULL): 2.6 MMHG
BH CV ECHO MEAS - AO MEAN PG: 6 MMHG
BH CV ECHO MEAS - AO ROOT AREA (BSA CORRECTED): 1.5
BH CV ECHO MEAS - AO ROOT AREA: 7.6 CM^2
BH CV ECHO MEAS - AO ROOT DIAM: 3.1 CM
BH CV ECHO MEAS - AO V2 MAX: 176.1 CM/SEC
BH CV ECHO MEAS - AO V2 MEAN: 114.7 CM/SEC
BH CV ECHO MEAS - AO V2 VTI: 36.3 CM
BH CV ECHO MEAS - ASC AORTA: 3 CM
BH CV ECHO MEAS - AVA(I,A): 2.4 CM^2
BH CV ECHO MEAS - AVA(I,D): 2.4 CM^2
BH CV ECHO MEAS - AVA(V,A): 2.3 CM^2
BH CV ECHO MEAS - AVA(V,D): 2.3 CM^2
BH CV ECHO MEAS - BSA(HAYCOCK): 2.3 M^2
BH CV ECHO MEAS - BSA: 2.1 M^2
BH CV ECHO MEAS - BZI_BMI: 43.2 KILOGRAMS/M^2
BH CV ECHO MEAS - BZI_METRIC_HEIGHT: 160 CM
BH CV ECHO MEAS - BZI_METRIC_WEIGHT: 110.7 KG
BH CV ECHO MEAS - EDV(CUBED): 63.1 ML
BH CV ECHO MEAS - EDV(MOD-SP2): 46 ML
BH CV ECHO MEAS - EDV(MOD-SP4): 47 ML
BH CV ECHO MEAS - EDV(TEICH): 69.2 ML
BH CV ECHO MEAS - EF(CUBED): 71.7 %
BH CV ECHO MEAS - EF(MOD-BP): 59 %
BH CV ECHO MEAS - EF(MOD-SP2): 54.3 %
BH CV ECHO MEAS - EF(MOD-SP4): 66 %
BH CV ECHO MEAS - EF(TEICH): 64 %
BH CV ECHO MEAS - ESV(CUBED): 17.9 ML
BH CV ECHO MEAS - ESV(MOD-SP2): 21 ML
BH CV ECHO MEAS - ESV(MOD-SP4): 16 ML
BH CV ECHO MEAS - ESV(TEICH): 25 ML
BH CV ECHO MEAS - FS: 34.3 %
BH CV ECHO MEAS - IVS/LVPW: 1.1
BH CV ECHO MEAS - IVSD: 1.3 CM
BH CV ECHO MEAS - LA DIMENSION: 3.1 CM
BH CV ECHO MEAS - LA/AO: 0.99
BH CV ECHO MEAS - LAD MAJOR: 5.2 CM
BH CV ECHO MEAS - LAT PEAK E' VEL: 10.4 CM/SEC
BH CV ECHO MEAS - LATERAL E/E' RATIO: 6
BH CV ECHO MEAS - LV DIASTOLIC VOL/BSA (35-75): 22.3 ML/M^2
BH CV ECHO MEAS - LV MASS(C)D: 168.9 GRAMS
BH CV ECHO MEAS - LV MASS(C)DI: 80.3 GRAMS/M^2
BH CV ECHO MEAS - LV MAX PG: 6.9 MMHG
BH CV ECHO MEAS - LV MEAN PG: 3.4 MMHG
BH CV ECHO MEAS - LV SYSTOLIC VOL/BSA (12-30): 7.6 ML/M^2
BH CV ECHO MEAS - LV V1 MAX: 131.2 CM/SEC
BH CV ECHO MEAS - LV V1 MEAN: 83.6 CM/SEC
BH CV ECHO MEAS - LV V1 VTI: 28.3 CM
BH CV ECHO MEAS - LVIDD: 4 CM
BH CV ECHO MEAS - LVIDS: 2.6 CM
BH CV ECHO MEAS - LVLD AP2: 6.9 CM
BH CV ECHO MEAS - LVLD AP4: 6.9 CM
BH CV ECHO MEAS - LVLS AP2: 6.3 CM
BH CV ECHO MEAS - LVLS AP4: 5.9 CM
BH CV ECHO MEAS - LVOT AREA (M): 3.1 CM^2
BH CV ECHO MEAS - LVOT AREA: 3.1 CM^2
BH CV ECHO MEAS - LVOT DIAM: 2 CM
BH CV ECHO MEAS - LVPWD: 1.2 CM
BH CV ECHO MEAS - MED PEAK E' VEL: 10.2 CM/SEC
BH CV ECHO MEAS - MEDIAL E/E' RATIO: 6.1
BH CV ECHO MEAS - MV A MAX VEL: 76.8 CM/SEC
BH CV ECHO MEAS - MV DEC TIME: 0.26 SEC
BH CV ECHO MEAS - MV E MAX VEL: 63.2 CM/SEC
BH CV ECHO MEAS - MV E/A: 0.82
BH CV ECHO MEAS - MV MAX PG: 3.3 MMHG
BH CV ECHO MEAS - MV MEAN PG: 1.3 MMHG
BH CV ECHO MEAS - MV V2 MAX: 91.4 CM/SEC
BH CV ECHO MEAS - MV V2 MEAN: 52.2 CM/SEC
BH CV ECHO MEAS - MV V2 VTI: 26.3 CM
BH CV ECHO MEAS - MVA(VTI): 3.4 CM^2
BH CV ECHO MEAS - PA ACC SLOPE: 309.6 CM/SEC^2
BH CV ECHO MEAS - PA ACC TIME: 0.16 SEC
BH CV ECHO MEAS - PA MAX PG: 3.7 MMHG
BH CV ECHO MEAS - PA PR(ACCEL): 7.7 MMHG
BH CV ECHO MEAS - PA V2 MAX: 96.1 CM/SEC
BH CV ECHO MEAS - RAP SYSTOLE: 3 MMHG
BH CV ECHO MEAS - RVSP: 17 MMHG
BH CV ECHO MEAS - SI(AO): 131.9 ML/M^2
BH CV ECHO MEAS - SI(CUBED): 21.5 ML/M^2
BH CV ECHO MEAS - SI(LVOT): 42 ML/M^2
BH CV ECHO MEAS - SI(MOD-SP2): 11.9 ML/M^2
BH CV ECHO MEAS - SI(MOD-SP4): 14.7 ML/M^2
BH CV ECHO MEAS - SI(TEICH): 21 ML/M^2
BH CV ECHO MEAS - SV(AO): 277.6 ML
BH CV ECHO MEAS - SV(CUBED): 45.2 ML
BH CV ECHO MEAS - SV(LVOT): 88.4 ML
BH CV ECHO MEAS - SV(MOD-SP2): 25 ML
BH CV ECHO MEAS - SV(MOD-SP4): 31 ML
BH CV ECHO MEAS - SV(TEICH): 44.3 ML
BH CV ECHO MEAS - TAPSE (>1.6): 2.3 CM2
BH CV ECHO MEAS - TR MAX PG: 14 MMHG
BH CV ECHO MEAS - TR MAX VEL: 185.2 CM/SEC
BH CV ECHO MEASUREMENTS AVERAGE E/E' RATIO: 6.14
BH CV VAS BP LEFT ARM: NORMAL MMHG
BH CV XLRA - RV BASE: 3.4 CM
BH CV XLRA - RV LENGTH: 6.3 CM
BH CV XLRA - RV MID: 2.7 CM
BH CV XLRA - TDI S': 13.5 CM/SEC
BILIRUB SERPL-MCNC: 0.2 MG/DL (ref 0.2–1.2)
BUN BLD-MCNC: 15 MG/DL (ref 8–23)
BUN/CREAT SERPL: 23.1 (ref 7–25)
CALCIUM SPEC-SCNC: 8.4 MG/DL (ref 8.6–10.5)
CHLORIDE SERPL-SCNC: 100 MMOL/L (ref 98–107)
CO2 SERPL-SCNC: 27 MMOL/L (ref 22–29)
CREAT BLD-MCNC: 0.65 MG/DL (ref 0.57–1)
DEPRECATED RDW RBC AUTO: 42.3 FL (ref 37–54)
EOSINOPHIL # BLD AUTO: 0.24 10*3/MM3 (ref 0–0.4)
EOSINOPHIL NFR BLD AUTO: 3.9 % (ref 0.3–6.2)
ERYTHROCYTE [DISTWIDTH] IN BLOOD BY AUTOMATED COUNT: 12.8 % (ref 12.3–15.4)
FERRITIN SERPL-MCNC: 467.3 NG/ML (ref 13–150)
GFR SERPL CREATININE-BSD FRML MDRD: 92 ML/MIN/1.73
GLOBULIN UR ELPH-MCNC: 2.7 GM/DL
GLUCOSE BLD-MCNC: 116 MG/DL (ref 65–99)
HCT VFR BLD AUTO: 39.6 % (ref 34–46.6)
HGB BLD-MCNC: 12.9 G/DL (ref 12–15.9)
IMM GRANULOCYTES # BLD AUTO: 0.11 10*3/MM3 (ref 0–0.05)
IMM GRANULOCYTES NFR BLD AUTO: 1.8 % (ref 0–0.5)
LDH SERPL-CCNC: 343 U/L (ref 135–214)
LEFT ATRIUM VOLUME INDEX: 10.5 ML/M^2
LEFT ATRIUM VOLUME: 22 ML
LYMPHOCYTES # BLD AUTO: 2.45 10*3/MM3 (ref 0.7–3.1)
LYMPHOCYTES NFR BLD AUTO: 39.5 % (ref 19.6–45.3)
MAGNESIUM SERPL-MCNC: 2.1 MG/DL (ref 1.6–2.4)
MAXIMAL PREDICTED HEART RATE: 156 BPM
MCH RBC QN AUTO: 30.1 PG (ref 26.6–33)
MCHC RBC AUTO-ENTMCNC: 32.6 G/DL (ref 31.5–35.7)
MCV RBC AUTO: 92.3 FL (ref 79–97)
MONOCYTES # BLD AUTO: 0.54 10*3/MM3 (ref 0.1–0.9)
MONOCYTES NFR BLD AUTO: 8.7 % (ref 5–12)
NEUTROPHILS # BLD AUTO: 2.85 10*3/MM3 (ref 1.7–7)
NEUTROPHILS NFR BLD AUTO: 45.8 % (ref 42.7–76)
NRBC BLD AUTO-RTO: 0 /100 WBC (ref 0–0.2)
NT-PROBNP SERPL-MCNC: 84 PG/ML (ref 5–900)
PLATELET # BLD AUTO: 311 10*3/MM3 (ref 140–450)
PMV BLD AUTO: 11.2 FL (ref 6–12)
POTASSIUM BLD-SCNC: 4.3 MMOL/L (ref 3.5–5.2)
PROT SERPL-MCNC: 6 G/DL (ref 6–8.5)
RBC # BLD AUTO: 4.29 10*6/MM3 (ref 3.77–5.28)
REF LAB TEST METHOD: ABNORMAL
SARS-COV-2 RNA RESP QL NAA+PROBE: DETECTED
SODIUM BLD-SCNC: 140 MMOL/L (ref 136–145)
STRESS TARGET HR: 133 BPM
TROPONIN T SERPL-MCNC: <0.01 NG/ML (ref 0–0.03)
TROPONIN T SERPL-MCNC: <0.01 NG/ML (ref 0–0.03)
WBC NRBC COR # BLD: 6.21 10*3/MM3 (ref 3.4–10.8)

## 2020-05-08 PROCEDURE — 97530 THERAPEUTIC ACTIVITIES: CPT

## 2020-05-08 PROCEDURE — 83880 ASSAY OF NATRIURETIC PEPTIDE: CPT | Performed by: PHYSICIAN ASSISTANT

## 2020-05-08 PROCEDURE — 84484 ASSAY OF TROPONIN QUANT: CPT | Performed by: PHYSICIAN ASSISTANT

## 2020-05-08 PROCEDURE — 93010 ELECTROCARDIOGRAM REPORT: CPT | Performed by: INTERNAL MEDICINE

## 2020-05-08 PROCEDURE — 93306 TTE W/DOPPLER COMPLETE: CPT

## 2020-05-08 PROCEDURE — 83735 ASSAY OF MAGNESIUM: CPT | Performed by: INTERNAL MEDICINE

## 2020-05-08 PROCEDURE — 93306 TTE W/DOPPLER COMPLETE: CPT | Performed by: INTERNAL MEDICINE

## 2020-05-08 PROCEDURE — 83615 LACTATE (LD) (LDH) ENZYME: CPT | Performed by: INTERNAL MEDICINE

## 2020-05-08 PROCEDURE — 25010000002 ENOXAPARIN PER 10 MG: Performed by: INTERNAL MEDICINE

## 2020-05-08 PROCEDURE — 93005 ELECTROCARDIOGRAM TRACING: CPT | Performed by: INTERNAL MEDICINE

## 2020-05-08 PROCEDURE — 71045 X-RAY EXAM CHEST 1 VIEW: CPT

## 2020-05-08 PROCEDURE — 80053 COMPREHEN METABOLIC PANEL: CPT | Performed by: PHYSICIAN ASSISTANT

## 2020-05-08 PROCEDURE — 99233 SBSQ HOSP IP/OBS HIGH 50: CPT | Performed by: INTERNAL MEDICINE

## 2020-05-08 PROCEDURE — 85025 COMPLETE CBC W/AUTO DIFF WBC: CPT | Performed by: PHYSICIAN ASSISTANT

## 2020-05-08 PROCEDURE — 82728 ASSAY OF FERRITIN: CPT | Performed by: INTERNAL MEDICINE

## 2020-05-08 RX ORDER — CALCIUM CARBONATE 750 MG/1
1500 TABLET, CHEWABLE ORAL 3 TIMES DAILY PRN
Status: DISCONTINUED | OUTPATIENT
Start: 2020-05-08 | End: 2020-05-12 | Stop reason: HOSPADM

## 2020-05-08 RX ORDER — PANTOPRAZOLE SODIUM 40 MG/1
40 TABLET, DELAYED RELEASE ORAL
Status: DISCONTINUED | OUTPATIENT
Start: 2020-05-08 | End: 2020-05-12 | Stop reason: HOSPADM

## 2020-05-08 RX ORDER — HYDROCODONE BITARTRATE AND ACETAMINOPHEN 5; 325 MG/1; MG/1
1 TABLET ORAL ONCE AS NEEDED
Status: COMPLETED | OUTPATIENT
Start: 2020-05-08 | End: 2020-05-08

## 2020-05-08 RX ORDER — ASPIRIN 81 MG/1
324 TABLET, CHEWABLE ORAL ONCE
Status: COMPLETED | OUTPATIENT
Start: 2020-05-08 | End: 2020-05-08

## 2020-05-08 RX ORDER — FUROSEMIDE 20 MG/1
20 TABLET ORAL ONCE
Status: COMPLETED | OUTPATIENT
Start: 2020-05-08 | End: 2020-05-08

## 2020-05-08 RX ORDER — NITROGLYCERIN 0.4 MG/1
0.4 TABLET SUBLINGUAL ONCE
Status: COMPLETED | OUTPATIENT
Start: 2020-05-08 | End: 2020-05-08

## 2020-05-08 RX ADMIN — CALCIUM CARBONATE 1500 MG: 750 TABLET ORAL at 11:57

## 2020-05-08 RX ADMIN — ATENOLOL 25 MG: 25 TABLET ORAL at 09:12

## 2020-05-08 RX ADMIN — ASPIRIN 81 MG 324 MG: 81 TABLET ORAL at 05:20

## 2020-05-08 RX ADMIN — NITROGLYCERIN 0.4 MG: 0.4 TABLET SUBLINGUAL at 04:06

## 2020-05-08 RX ADMIN — ACETAMINOPHEN 650 MG: 325 TABLET, FILM COATED ORAL at 09:12

## 2020-05-08 RX ADMIN — ENOXAPARIN SODIUM 120 MG: 120 INJECTION SUBCUTANEOUS at 20:04

## 2020-05-08 RX ADMIN — FUROSEMIDE 20 MG: 20 TABLET ORAL at 18:03

## 2020-05-08 RX ADMIN — SODIUM CHLORIDE, PRESERVATIVE FREE 10 ML: 5 INJECTION INTRAVENOUS at 09:13

## 2020-05-08 RX ADMIN — HYDROCODONE BITARTRATE AND ACETAMINOPHEN 1 TABLET: 5; 325 TABLET ORAL at 05:20

## 2020-05-08 RX ADMIN — DILTIAZEM HYDROCHLORIDE 240 MG: 240 CAPSULE, COATED, EXTENDED RELEASE ORAL at 09:12

## 2020-05-08 RX ADMIN — ACETAMINOPHEN 650 MG: 325 TABLET, FILM COATED ORAL at 18:02

## 2020-05-08 RX ADMIN — SODIUM CHLORIDE, PRESERVATIVE FREE 10 ML: 5 INJECTION INTRAVENOUS at 20:04

## 2020-05-08 RX ADMIN — ACETAMINOPHEN 650 MG: 325 TABLET, FILM COATED ORAL at 03:17

## 2020-05-08 RX ADMIN — PANTOPRAZOLE SODIUM 40 MG: 40 TABLET, DELAYED RELEASE ORAL at 11:57

## 2020-05-08 RX ADMIN — ENOXAPARIN SODIUM 120 MG: 120 INJECTION SUBCUTANEOUS at 09:12

## 2020-05-08 RX ADMIN — FUROSEMIDE 20 MG: 20 TABLET ORAL at 09:12

## 2020-05-08 RX ADMIN — PRAVASTATIN SODIUM 40 MG: 40 TABLET ORAL at 20:04

## 2020-05-08 NOTE — THERAPY TREATMENT NOTE
Acute Care - Occupational Therapy Treatment Note  Psychiatric     Patient Name: Marlene Aguirre  : 1955  MRN: [id number]  Today's Date: 2020             Admit Date: 2020       ICD-10-CM ICD-9-CM   1. Acute hypoxemic respiratory failure (CMS/HCC) J96.01 518.81   2. COVID-19 virus infection U07.1      Patient Active Problem List   Diagnosis   • Acute hypoxemic respiratory failure (CMS/HCC)   • COVID-19 Pneumonitis   • Fever   • Paroxysmal atrial fibrillation (CMS/HCC)   • Chronic anticoagulation   • History of pacemaker   • Multiple allergies (ct scan iv contrast, pcn, keflex, quinalones, tetracyclines, kenalog)   • Obesity (BMI 30-39.9)   • Asthma     Past Medical History:   Diagnosis Date   • Asthma    • Atrial fibrillation (CMS/HCC)    • Coronary artery disease    • Hypertension      Past Surgical History:   Procedure Laterality Date   • BILATERAL BREAST REDUCTION     • BRAIN SURGERY     • DILATATION AND CURETTAGE     • PACEMAKER IMPLANTATION         Therapy Treatment    Rehabilitation Treatment Summary     Row Name 20 1020             Treatment Time/Intention    Discipline  occupational therapist  -CL      Document Type  therapy note (daily note)  -CL      Subjective Information  complains of;fatigue  -CL      Mode of Treatment  occupational therapy  -CL      Patient Effort  good  -CL      Existing Precautions/Restrictions  fall;oxygen therapy device and L/min  -CL      Recorded by [CL] Shadia Gama OT 20 1303      Row Name 20 1020             Vital Signs    Pre Systolic BP Rehab  -- VSS  -CL      Recorded by [CL] Shadia Gama OT 20 1303      Row Name 20 1020             Cognitive Assessment/Intervention- PT/OT    Affect/Mental Status (Cognitive)  WFL  -CL      Orientation Status (Cognition)  oriented x 3  -CL      Follows Commands (Cognition)  WFL  -CL      Cognitive Function (Cognitive)  WFL  -CL      Recorded by [CL] Shadia Gama OT 20 1303      Row Name  05/08/20 1020             Bed Mobility Assessment/Treatment    Bed Mobility Assessment/Treatment  supine-sit  -CL      Supine-Sit [place] (Bed Mobility)  minimum assist (75% patient effort);verbal cues  -CL      Assistive Device (Bed Mobility)  bed rails;head of bed elevated  -CL      Recorded by [CL] Shadia Gama, OT 05/08/20 1303      Row Name 05/08/20 1020             Transfer Assessment/Treatment    Transfer Assessment/Treatment  sit-stand transfer;stand-sit transfer;toilet transfer  -CL      Recorded by [CL] Shadia Gama, OT 05/08/20 1303      Row Name 05/08/20 1020             Sit-Stand Transfer    Sit-Stand [place] (Transfers)  contact guard;verbal cues  -CL      Assistive Device (Sit-Stand Transfers)  walker, front-wheeled  -CL      Recorded by [CL] Shadia Gama, OT 05/08/20 1303      Row Name 05/08/20 1020             Stand-Sit Transfer    Stand-Sit [place] (Transfers)  contact guard;verbal cues  -CL      Assistive Device (Stand-Sit Transfers)  walker, front-wheeled  -CL      Recorded by [CL] Shadia Gama, OT 05/08/20 1303      Row Name 05/08/20 1020             Toilet Transfer    Type (Toilet Transfer)  sit-stand;stand-sit  -CL      [place] Level (Toilet Transfer)  contact guard;verbal cues  -CL      Assistive Device (Toilet Transfer)  commode, bedside without drop arms;walker, front-wheeled  -CL      Recorded by [CL] Shadia Gama, OT 05/08/20 1303      Row Name 05/08/20 1020             ADL Assessment/Intervention    BADL Assessment/Intervention  lower body dressing;toileting  -CL      Recorded by [CL] Shadia Gama, OT 05/08/20 1303      Row Name 05/08/20 1020             Lower Body Dressing Assessment/Training    Lower Body Dressing [place] Level  don;socks;dependent (less than 25% patient effort);doff  -CL      Lower Body Dressing Position  supine  -CL      Recorded by [CL] Shadia Gama, OT 05/08/20 1303      Row Name 05/08/20 1020             Toileting Assessment/Training     [place] Level (Toileting)  adjust/manage clothing;perform perineal hygiene;dependent (less than 25% patient effort);verbal cues  -CL      Toileting Position  supported sitting  -CL      Recorded by [CL] Shadia Gama OT 05/08/20 1303      Row Name 05/08/20 1020             Motor Skills Assessment/Interventions    Additional Documentation  Therapeutic Exercise (Group)  -CL      Recorded by [CL] Shadia Gama OT 05/08/20 1303      Row Name 05/08/20 1020             Therapeutic Exercise    69781 - OT Therapeutic Activity Minutes  10  -CL      Recorded by [CL] Shadia Gama OT 05/08/20 1303      Row Name 05/08/20 1020             Balance    Balance  static sitting balance;static standing balance  -CL      Recorded by [CL] Shadia Gama OT 05/08/20 1303      Row Name 05/08/20 1020             Static Sitting Balance    Level of [place] (Unsupported Sitting, Static Balance)  supervision  -CL      Sitting Position (Unsupported Sitting, Static Balance)  sitting on edge of bed  -CL      Recorded by [CL] Shadia Gama OT 05/08/20 1303      Row Name 05/08/20 1020             Static Standing Balance    Level of [place] (Supported Standing, Static Balance)  contact guard assist  -CL      Assistive Device Utilized (Supported Standing, Static Balance)  walker, rolling  -CL      Recorded by [CL] Shadia Gama OT 05/08/20 1303      Row Name 05/08/20 1020             Positioning and Restraints    Pre-Treatment Position  in bed  -CL      Post Treatment Position  other  -CL      Other Position  with other staff w/ PT  -CL      Recorded by [CL] Shadia Gama OT 05/08/20 1303      Row Name 05/08/20 1020             Pain Scale: Numbers Pre/Post-Treatment    Pain Scale: Numbers, Pretreatment  0/10 - no pain  -CL      Pain Scale: Numbers, Post-Treatment  0/10 - no pain  -CL      Recorded by [CL] Shadia Gama OT 05/08/20 1303      Row Name 05/08/20 1020             Outcome Summary/Treatment Plan (OT)    Anticipated Discharge  Disposition (OT)  home with home health;home with 24/7 care  -CL      Recorded by [CL] Shadia Gama OT 05/08/20 1303        User Key  (r) = Recorded By, (t) = Taken By, (c) = Cosigned By    Initials Name Effective Dates Discipline    CL Shadia Gama OT 04/03/18 -  OT             Occupational Therapy Education                 Title: PT OT SLP Therapies (In Progress)     Topic: Occupational Therapy (In Progress)     Point: ADL training (In Progress)     Description:   Instruct learner(s) on proper safety adaptation and remediation techniques during self care or transfers.   Instruct in proper use of assistive devices.              Learning Progress Summary           Patient Acceptance, E, NR by CL at 5/8/2020 1020    Acceptance, E, NR by CL at 5/7/2020 0904                   Point: Home exercise program (Not Started)     Description:   Instruct learner(s) on appropriate technique for monitoring, assisting and/or progressing therapeutic exercises/activities.              Learner Progress:   Not documented in this visit.          Point: Precautions (In Progress)     Description:   Instruct learner(s) on prescribed precautions during self-care and functional transfers.              Learning Progress Summary           Patient Acceptance, E, NR by CL at 5/8/2020 1020    Acceptance, E, NR by CL at 5/7/2020 0904                   Point: Body mechanics (In Progress)     Description:   Instruct learner(s) on proper positioning and spine alignment during self-care, functional mobility activities and/or exercises.              Learning Progress Summary           Patient Acceptance, E, NR by CL at 5/8/2020 1020    Acceptance, E, NR by CL at 5/7/2020 0904                               User Key     Initials Effective Dates Name Provider Type Discipline     04/03/18 -  Shadia Gama OT Occupational Therapist OT                OT Recommendation and Plan  Outcome Summary/Treatment Plan (OT)  Anticipated Equipment Needs at  Discharge (OT): (TBA further)  Anticipated Discharge Disposition (OT): home with home health, home with 24/7 care  Therapy Frequency (OT Eval): daily  Plan of Care Review  Plan of Care Reviewed With: patient  Plan of Care Reviewed With: patient  Outcome Summary: Pt CGA for t/fs and demo improved activity tolerance this date, however conts to be Dep for LB ADL tasks and c/o weakness/fatigue. Pt may benefit from AE education next session. Recommend cont skilled IPOT POC. Recommend pt DC home w/ 24/7 assist and HH OT/PT services.  Outcome Measures     Row Name 05/08/20 1020 05/07/20 0904          How much help from another is currently needed...    Putting on and taking off regular lower body clothing?  2  -CL  2  -CL     Bathing (including washing, rinsing, and drying)  2  -CL  2  -CL     Toileting (which includes using toilet bed pan or urinal)  1  -CL  1  -CL     Putting on and taking off regular upper body clothing  2  -CL  2  -CL     Taking care of personal grooming (such as brushing teeth)  3  -CL  3  -CL     Eating meals  4  -CL  4  -CL     AM-PAC 6 Clicks Score (OT)  14  -CL  14  -CL        Functional Assessment    Outcome Measure Options  AM-PAC 6 Clicks Daily Activity (OT)  -CL  AM-PAC 6 Clicks Daily Activity (OT)  -CL       User Key  (r) = Recorded By, (t) = Taken By, (c) = Cosigned By    Initials Name Provider Type    Shadia Hayes OT Occupational Therapist           Time Calculation:   Time Calculation- OT     Row Name 05/08/20 1020             Time Calculation- OT    OT Start Time  1020  -CL      OT Received On  05/08/20  -CL      OT Goal Re-Cert Due Date  05/17/20  -CL         Timed Charges    66491 - OT Therapeutic Activity Minutes  10  -CL        User Key  (r) = Recorded By, (t) = Taken By, (c) = Cosigned By    Initials Name Provider Type    Shadia Hayes OT Occupational Therapist        Therapy Charges for Today     Code Description Service Date Service Provider Modifiers Qty    02791690239   OT EVAL LOW COMPLEXITY 4 5/7/2020 Shadia Gama OT GO 1    [id number]  OT THERAPEUTIC ACT EA 15 MIN 5/8/2020 Shadia Gama OT GO 1               Shadia Gama OT  5/8/2020

## 2020-05-08 NOTE — PLAN OF CARE
Problem: Patient Care Overview  Goal: Plan of Care Review  Outcome: Ongoing (interventions implemented as appropriate)  Flowsheets (Taken 5/8/2020 1020)  Progress: improving  Plan of Care Reviewed With: patient  Outcome Summary: Pt CGA for t/fs and demo improved activity tolerance this date, however conts to be Dep for LB ADL tasks and c/o weakness/fatigue. Pt may benefit from AE education next session. Recommend cont skilled IPOT POC. Recommend pt DC home w/ 24/7 assist and HH OT/PT services.

## 2020-05-08 NOTE — PLAN OF CARE
Problem: Patient Care Overview  Goal: Plan of Care Review  Outcome: Ongoing (interventions implemented as appropriate)  Flowsheets  Taken 5/7/2020 1024 by Elizabeth Barton PT  Progress: improving  Taken 5/7/2020 2000 by Sondra Underwood, RN  Plan of Care Reviewed With: patient  Taken 5/8/2020 0255 by Sondra Underwood, RN  Outcome Summary: Pt continues to need 3L O2NC. Has been resting well this shift. VSS. Will continue to monitor. 0250 5/8/2020

## 2020-05-08 NOTE — SIGNIFICANT NOTE
RN called to report patient c/o sudden onset of 10/10 chest pain. No hx of pain. PMHx of CAD and A.fib on therapeutic Lovenox currently.  The patient denies nausea, diaphoresis or SOB. ECG atrial- paced. Vitals stable. Obtain serial troponin x 3 along with basic labs and chest x-ray. Administer sublingual NTG and 324 mg of ASA. Consult cardiology.

## 2020-05-08 NOTE — PROGRESS NOTES
Continued Stay Note  [facility]     Patient Name: Marlene Aguirre  MRN: [id number]  Today's Date: 5/8/2020    Admit Date: 4/29/2020    Discharge Plan     Row Name 05/08/20 1448       Plan    Plan  Referral to [facility]     Patient/Family in Agreement with Plan  yes    Plan Comments  Spoke with patient over the phone due to covid-19 diagnosis - She states she no longer feels comfortable going home and is interested in rehab. Referral called to Jen at [facility] as they are the only ones taking positive patients at this time in [facility]. Patient is not comfortable going home on oxygen as her  is a smoker and alcoholic. Jen will work up her referral and notify CM of bed availability - CM following -dianelys [phone]    Final Discharge Disposition Code  62 - inpatient rehab facility        Discharge Codes    No documentation.       Expected Discharge Date and Time     Expected Discharge Date Expected Discharge Time    May 11, 2020             Dianelys Sharp RN

## 2020-05-08 NOTE — PLAN OF CARE
VSS, patient reported no CP this shift, has c/o feeling very tired. Repeat covid positive. Will continue current plan of care and to monitor patient

## 2020-05-08 NOTE — THERAPY TREATMENT NOTE
Patient Name: Marlene Aguirre  : 1955    MRN: [id number]                              Today's Date: 2020       Admit Date: 2020    Visit Dx:     ICD-10-CM ICD-9-CM   1. Acute hypoxemic respiratory failure (CMS/HCC) J96.01 518.81   2. COVID-19 virus infection U07.1      Patient Active Problem List   Diagnosis   • Acute hypoxemic respiratory failure (CMS/HCC)   • COVID-19 Pneumonitis   • Fever   • Paroxysmal atrial fibrillation (CMS/HCC)   • Chronic anticoagulation   • History of pacemaker   • Multiple allergies (ct scan iv contrast, pcn, keflex, quinalones, tetracyclines, kenalog)   • Obesity (BMI 30-39.9)   • Asthma     Past Medical History:   Diagnosis Date   • Asthma    • Atrial fibrillation (CMS/HCC)    • Coronary artery disease    • Hypertension      Past Surgical History:   Procedure Laterality Date   • BILATERAL BREAST REDUCTION     • BRAIN SURGERY     • DILATATION AND CURETTAGE     • PACEMAKER IMPLANTATION       General Information     Row Name 20 1533          PT Evaluation Time/Intention    Document Type  therapy note (daily note)  -KR     Mode of Treatment  physical therapy  -KR     Row Name 20 1533          General Information    Existing Precautions/Restrictions  fall;oxygen therapy device and L/min  -KR     Row Name 20 1533          Cognitive Assessment/Intervention- PT/OT    Orientation Status (Cognition)  oriented x 3  -KR     Row Name 20 1533          Safety Issues, Functional Mobility    Safety Issues Affecting Function (Mobility)  insight into deficits/self awareness;safety precaution awareness;safety precautions follow-through/compliance  -KR     Impairments Affecting Function (Mobility)  balance;coordination;endurance/activity tolerance;postural/trunk control;shortness of breath;strength  -KR       User Key  (r) = Recorded By, (t) = Taken By, (c) = Cosigned By    Initials Name Provider Type    Susie Mooney, PT Physical Therapist        Mobility      Row Name 05/08/20 1533          Bed Mobility Assessment/Treatment    Bed Mobility Assessment/Treatment  sit-supine  -KR     Sit-Supine [place] (Bed Mobility)  minimum assist (75% patient effort);verbal cues  -KR     Assistive Device (Bed Mobility)  head of bed elevated  -KR     Comment (Bed Mobility)  VC's for sequencing. Pt required minimal assistance with BLEs when returning to supine.   -KR     Row Name 05/08/20 1533          Transfer Assessment/Treatment    Comment (Transfers)  VC's for sequencing and safe hand placement. Toilet transfer to [facility] with Fabio.   -KR     Row Name 05/08/20 1533          Sit-Stand Transfer    Sit-Stand [place] (Transfers)  contact guard;verbal cues  -KR     Assistive Device (Sit-Stand Transfers)  walker, front-wheeled  -KR     Row Name 05/08/20 1533          Gait/Stairs Assessment/Training    Gait/Stairs Assessment/Training  gait/ambulation assistive device  -KR     [place] Level (Gait)  contact guard;verbal cues  -KR     Assistive Device (Gait)  walker, front-wheeled  -KR     Distance in Feet (Gait)  40  -KR     Pattern (Gait)  step-to  -KR     Deviations/Abnormal Patterns (Gait)  base of support, wide;nella decreased;stride length decreased  -KR     Bilateral Gait Deviations  forward flexed posture;heel strike decreased  -KR     Comment (Gait/Stairs)  Pt demonstrated step to gait pattern with slow nella and decreased step length. VC's for upright posture. Pt demonstrated improved stability and endurance this session; pt did not require any rest breaks. Mobility limited by fatigue.   -KR       User Key  (r) = Recorded By, (t) = Taken By, (c) = Cosigned By    Initials Name Provider Type    Susie Mooney PT Physical Therapist        Obj/Interventions     Row Name 05/08/20 1544          Static Sitting Balance    Level of [place] (Unsupported Sitting, Static Balance)  supervision  -KR     Sitting Position (Unsupported Sitting, Static Balance)  sitting on edge  of bed  -KR     Row Name 05/08/20 1544          Static Standing Balance    Level of [place] (Supported Standing, Static Balance)  contact guard assist  -KR     Assistive Device Utilized (Supported Standing, Static Balance)  walker, rolling  -KR     Row Name 05/08/20 1544          Dynamic Standing Balance    Level of [place], Reaches Outside Midline (Standing, Dynamic Balance)  contact guard assist  -KR     Assistive Device Utilized (Supported Standing, Dynamic Balance)  walker, rolling  -KR       User Key  (r) = Recorded By, (t) = Taken By, (c) = Cosigned By    Initials Name Provider Type    Susie Mooney, PT Physical Therapist        Goals/Plan    No documentation.       Clinical Impression     Row Name 05/08/20 1544          Pain Assessment    Additional Documentation  Pain Scale: Numbers Pre/Post-Treatment (Group)  -KR     [facility] Name 05/08/20 1544          Pain Scale: Numbers Pre/Post-Treatment    Pain Scale: Numbers, Pretreatment  0/10 - no pain  -KR     Pain Scale: Numbers, Post-Treatment  0/10 - no pain  -KR     [facility] Name 05/08/20 1544          Vital Signs    Pre Systolic BP Rehab  127  -KR     Pre Treatment Diastolic BP  76  -KR     Pretreatment Heart Rate (beats/min)  71  -KR     Posttreatment Heart Rate (beats/min)  65  -KR     Pre SpO2 (%)  94  -KR     O2 Delivery Pre Treatment  supplemental O2  -KR     Post SpO2 (%)  94  -KR     O2 Delivery Post Treatment  supplemental O2  -KR     Pre Patient Position  Sitting  -KR     Intra Patient Position  Standing  -KR     Post Patient Position  Supine  -KR     [facility] Name 05/08/20 1544          Positioning and Restraints    Pre-Treatment Position  in bed  -KR     Post Treatment Position  bed  -KR     In Bed  notified nsg;supine;call light within reach;encouraged to call for assist;exit alarm on;side rails up x3;RUE elevated;LUE elevated  -KR       User Key  (r) = Recorded By, (t) = Taken By, (c) = Cosigned By    Initials Name Provider Type    KURT Godwin  Susie TORRES, PT Physical Therapist        Outcome Measures     Row Name 05/08/20 1545          How much help from another person do you currently need...    Turning from your back to your side while in flat bed without using bedrails?  3  -KR     Moving from lying on back to sitting on the side of a flat bed without bedrails?  3  -KR     Moving to and from a bed to a chair (including a wheelchair)?  3  -KR     Standing up from a chair using your arms (e.g., wheelchair, bedside chair)?  3  -KR     Climbing 3-5 steps with a railing?  2  -KR     To walk in hospital room?  3  -KR     AM-PAC 6 Clicks Score (PT)  17  -KR     Row Name 05/08/20 1545          Functional Assessment    Outcome Measure Options  AM-PAC 6 Clicks Basic Mobility (PT)  -KR       User Key  (r) = Recorded By, (t) = Taken By, (c) = Cosigned By    Initials Name Provider Type    Susie Mooney, PT Physical Therapist        Physical Therapy Education                 Title: PT OT SLP Therapies (In Progress)     Topic: Physical Therapy (In Progress)     Point: Mobility training (In Progress)     Description:   Instruct learner(s) on safety and technique for assisting patient out of bed, chair or wheelchair.  Instruct in the proper use of assistive devices, such as walker, crutches, cane or brace.              Patient Friendly Description:   It's important to get you on your feet again, but we need to do so in a way that is safe for you. Falling has serious consequences, and your personal safety is the most important thing of all.        When it's time to get out of bed, one of us or a family member will sit next to you on the bed to give you support.     If your doctor or nurse tells you to use a walker, crutches, a cane, or a brace, be sure you use it every time you get out of bed, even if you think you don't need it.    Learning Progress Summary           Patient Acceptance, E, NR by KURT at 5/8/2020 1045    Acceptance, E, VU by SHAHEED at 5/7/2020 1027     Acceptance, E,D, VU,NR by LS at 5/6/2020 1631                   Point: Home exercise program (In Progress)     Description:   Instruct learner(s) on appropriate technique for monitoring, assisting and/or progressing patient with therapeutic exercises and activities.              Learning Progress Summary           Patient Acceptance, E, NR by KR at 5/8/2020 1045    Acceptance, E, VU by VG at 5/7/2020 1027    Acceptance, E,D, VU,NR by LS at 5/6/2020 1631                   Point: Body mechanics (In Progress)     Description:   Instruct learner(s) on proper positioning and spine alignment for patient and/or caregiver during mobility tasks and/or exercises.              Learning Progress Summary           Patient Acceptance, E, NR by KR at 5/8/2020 1045    Acceptance, E, VU by VG at 5/7/2020 1027    Acceptance, E,D, VU,NR by LS at 5/6/2020 1631                   Point: Precautions (In Progress)     Description:   Instruct learner(s) on prescribed precautions during mobility and gait tasks              Learning Progress Summary           Patient Acceptance, E, NR by KR at 5/8/2020 1045    Acceptance, E, VU by VG at 5/7/2020 1027    Acceptance, E,D, VU,NR by LS at 5/6/2020 1631                               User Key     Initials Effective Dates Name Provider Type Discipline     06/19/15 -  Monica Bernal, PT Physical Therapist PT    KR 04/03/18 -  Susie Godwin, PT Physical Therapist PT    VG 05/29/18 -  Elizabeth Barton, PT Physical Therapist PT              PT Recommendation and Plan     Plan of Care Reviewed With: patient  Progress: improving  Outcome Summary: Pt increased ambulation distance to 40ft with RW and CGA. VC's for upright posture and increased stride length. Pt did not require any rest breaks; demonstrated improved balance and coordination this date. Mobility limited by fatigue. Continue to progress as appropriate.     Time Calculation:   PT Charges     Row Name 05/08/20 1045             Time  Calculation    Start Time  1045  -KR      PT Received On  05/08/20  -KR      PT Goal Re-Cert Due Date  05/16/20  -KR         Time Calculation- PT    Total Timed Code Minutes- PT  15 minute(s)  -KR         Timed Charges    29597 - PT Therapeutic Activity Minutes  15  -KR        User Key  (r) = Recorded By, (t) = Taken By, (c) = Cosigned By    Initials Name Provider Type    Susie Mooney, PT Physical Therapist        Therapy Charges for Today     Code Description Service Date Service Provider Modifiers Qty    17389012005  PT THERAPEUTIC ACT EA 15 MIN 5/8/2020 Susie Godwin PT GP 1          PT G-Codes  Outcome Measure Options: AM-PAC 6 Clicks Basic Mobility (PT)  AM-PAC 6 Clicks Score (PT): 17  AM-PAC 6 Clicks Score (OT): 14    Sosa Godwin PT  5/8/2020

## 2020-05-08 NOTE — PLAN OF CARE
Problem: Patient Care Overview  Goal: Plan of Care Review  Outcome: Ongoing (interventions implemented as appropriate)  Flowsheets (Taken 5/8/2020 1045)  Progress: improving  Plan of Care Reviewed With: patient  Outcome Summary: Pt increased ambulation distance to 40ft with RW and CGA. VC's for upright posture and increased stride length. Pt did not require any rest breaks; demonstrated improved balance and coordination this date. Mobility limited by fatigue. Continue to progress as appropriate.

## 2020-05-08 NOTE — PROGRESS NOTES
INFECTIOUS DISEASE CONSULT/INITIAL HOSPITAL VISIT    Marlene Aguirre  1955  [id number]    Date of Consult: 5/8/2020    Admission Date: 4/29/2020      Requesting Provider: Rishi Gill MD  Evaluating Physician: Braxton Horn MD    Reason for Consultation: COVID-19    History of present illness:    Patient is a 64 y.o. female who has intermittent asthma who has history of tobacco use and quit 3 years ago has multiple family members living with her.  Apparently patient developed headache, diarrhea, dry cough was given a Z-Jules by primary care physician on April 27,020 patient had progressive cough and fever and hypoxia on April 29.    Apparently a therapist that was coming to the house tested positive for COVID prior to patient's symptoms.    Patient admitted to the floor but because of worsening hypoxia and fevers was transferred to the intensive care unit.  Patient given 1 dose of 800 mg of Actemra last night    Patient is stable hemodynamically and on oxygen by nasal cannula currently her sats on 6 L are 91%    CT scan performed high probability for COVID-19 with bilateral groundglass opacities    COVID PCR positive    5/1/2020 patient received 2 units of convalescent serum plasma yesterday is received IV Lasix as well remains on 6 L with O2 sats between 88% and 90%.  Patient is a good spirits is sitting in chair is able to order food for nutrition per telephone call.  Per nursing staff had liquid bowel movements x3 yesterday is otherwise afebrile and hemodynamically stable    Review of systems remark for diarrhea no fevers    5/2/2020; patient required 8 L of oxygen by nasal cannula last night is otherwise hemodynamically stable.  Patient is eating small amounts reports that she ate for sherbet yesterday with the food otherwise states like cardboard.    Patient has intermittent cough which is productive.  She denies fevers rash sore throat ;    Patient was talked to by telephone and inspected  through the window.    5/3/2020; patient is doing fair he is on high flow nasal cannula now with better O2 sats but is on increasing amounts of oxygen.  Patient is afebrile and hemodynamically stable patient is being diuresed by pulmonary.  Does not report rash or sore throat patient spoke to over the telephone and visualized through the window    5/4/20; doing well; reports nose is stopped up. no events overnight; afebrile, hemodynamically stable; on high flow nasal cannula this is being weaned; denies chest pain, but does have intermittent coughing. Eating better.   Patient inspected from window and talked to by telephone    5/5/2020 patient is on less oxygen has been transitioned from high flow nasal cannula to regular nasal cannula on 6 L currently patient is resting quietly; reports congestion, abdominal cramping, diarrhea.   5/6/2020 patient is on 3 L oxygen is afebrile and hemodynamically stable to be transferred to floor good spirits no complaints today    5/7/20; doing well; no events overnight; no rash, sore throat, diarrhea; transferred to floor; requiring 3 liters o2; glad to be out of ICU; breathing is ok; still has cough, feels a little heaviness in chest, and dyspnea; feels better overall;     5/8/2020 patient is feeling fair did have some chest pain with radiation to the left scapula last night cardiology is being involved cardiac markers been obtained been negative patient denies shortness of breath denies fevers is not well rested today no other complaints    Past Medical History:   Diagnosis Date   • Asthma    • Atrial fibrillation (CMS/HCC)    • Coronary artery disease    • Hypertension        Past Surgical History:   Procedure Laterality Date   • BILATERAL BREAST REDUCTION     • BRAIN SURGERY     • DILATATION AND CURETTAGE     • PACEMAKER IMPLANTATION         History reviewed. No pertinent family history.    Social History     Socioeconomic History   • Marital status:      Spouse name:  Not on file   • Number of children: Not on file   • Years of education: Not on file   • Highest education level: Not on file   Tobacco Use   • Smoking status: Former Smoker   • Smokeless tobacco: Never Used   • Tobacco comment: QUIT 3 YEARS AGO   Substance and Sexual Activity   • Alcohol use: Never     Frequency: Never   • Drug use: Never   • Sexual activity: Defer       Allergies   Allergen Reactions   • Contrast Dye Swelling     FACE, NOSE AND NECK SWELLING AS WELL AS SANDI    • Doxycycline Unknown - Low Severity     PT UNSURE OF REACTION    • Flexeril [Cyclobenzaprine] Hives   • Keflex [Cephalexin] Unknown - Low Severity     PT DOES NOT REMEMBER    • Kenalog [Triamcinolone Acetonide] Itching   • Penicillins Hives   • Sulfa Antibiotics Itching   • Tetracyclines & Related Unknown - Low Severity     PT UNSURE OF REACTION   • Levaquin [Levofloxacin] Rash          Medication:    Current Facility-Administered Medications:   •  acetaminophen (TYLENOL) tablet 650 mg, 650 mg, Oral, Q6H PRN, Case, Conchita V., DO, 650 mg at 05/08/20 0912  •  albuterol sulfate HFA (PROVENTIL HFA;VENTOLIN HFA;PROAIR HFA) inhaler 2 puff, 2 puff, Inhalation, Q4H PRN, Case, Conchita V., DO, 2 puff at 04/30/20 1612  •  atenolol (TENORMIN) tablet 25 mg, 25 mg, Oral, Daily, Case, Conchita V., DO, 25 mg at 05/08/20 0912  •  calcium carbonate EX (TUMS EX) chewable tablet 1,500 mg, 1,500 mg, Oral, TID PRN, Rishi Gill MD, 1,500 mg at 05/08/20 1157  •  dilTIAZem CD (CARDIZEM CD) 24 hr capsule 240 mg, 240 mg, Oral, Daily, Case, Conchita V., DO, 240 mg at 05/08/20 0912  •  diphenhydrAMINE (BENADRYL) capsule 50 mg, 50 mg, Oral, BID PRN, Case, Conchita V., DO, 50 mg at 05/02/20 1607  •  enoxaparin (LOVENOX) syringe 120 mg, 1 mg/kg, Subcutaneous, Q12H, Case, Conchita V., DO, 120 mg at 05/08/20 0912  •  furosemide (LASIX) tablet 20 mg, 20 mg, Oral, Daily, Rishi Gill MD, 20 mg at 05/08/20 0912  •  magnesium sulfate 4 gram infusion - Mg less than  or equal to 1mg/dL, 4 g, Intravenous, PRN **OR** magnesium sulfate 3 gram infusion (1gm x 3) - Mg 1.1 - 1.5 mg/dL, 1 g, Intravenous, PRN **OR** Magnesium Sulfate 2 gram infusion- Mg 1.6 - 1.9 mg/dL, 2 g, Intravenous, PRN, Case, Conchita V., DO, Last Rate: 25 mL/hr at 20 0623, 2 g at 20 0623  •  pantoprazole (PROTONIX) EC tablet 40 mg, 40 mg, Oral, Q AM, WestRishi MD, 40 mg at 20 1157  •  potassium chloride (MICRO-K) CR capsule 40 mEq, 40 mEq, Oral, PRN, Case, Conchita V., DO, 40 mEq at 20 1221  •  pravastatin (PRAVACHOL) tablet 40 mg, 40 mg, Oral, Nightly, Case, Conchita V., DO, 40 mg at 20 2101  •  sodium chloride 0.9 % flush 10 mL, 10 mL, Intravenous, PRN, Case, Conchita V., DO  •  sodium chloride 0.9 % flush 10 mL, 10 mL, Intravenous, Q12H, Case, Conchita V., DO, 10 mL at 20 0913  •  sodium chloride 0.9 % flush 10 mL, 10 mL, Intravenous, PRN, Case, Conchita V., DO, 10 mL at 20 0001  •  sodium chloride nasal spray 2 spray, 2 spray, Each Nare, PRN, Case, Conchita V., DO, 2 spray at 20    Antibiotics:  Anti-Infectives (From admission, onward)    Ordered     Dose/Rate Route Frequency Start Stop    20  vancomycin 2250 mg/500 mL 0.9% NS IVPB ([facility])     Ordering Provider:  Sondra Worrell RPH    20 mg/kg × 118 kg  over 150 Minutes Intravenous Once 20  aztreonam (AZACTAM) 1 g in 50 mL NS IVPB     Ordering Provider:  Sondra Worrell RPH    1 g  over 30 Minutes Intravenous Once 20 0200            Review of Systems:  See HPI      Physical Exam:   Vital Signs  Temp (24hrs), Av.9 °F (36.6 °C), Min:97.1 °F (36.2 °C), Max:98.8 °F (37.1 °C)    Temp  Min: 97.1 °F (36.2 °C)  Max: 98.8 °F (37.1 °C)  BP  Min: 116/70  Max: 136/83  Pulse  Min: 61  Max: 73  Resp  Min: 16  Max: 18  SpO2  Min: 92 %  Max: 94 %  Patient seen through window and talked to on the telephone  GENERAL: Resting quietly in bed is on   oxygen by nasal cannula  HEENT: Normocephalic, atraumatic.  EOMI no external oral lesions    HEART: Sinus rhythm by telemetry  LUNGS: no respiratory distress  ABDOMEN: Bowel sounds were present.  No obvious abdominal distention on exam  Skin abrasions right hand make thumbs up well visualized through the window      NEURO: Oriented to PPT.  Able to have an appropriate conversation  PSYCHIATRIC:  Normal affect    Laboratory Data    Results from last 7 days   Lab Units 05/08/20  0424 05/07/20  0556 05/04/20  0408   WBC 10*3/mm3 6.21 6.24 7.40   HEMOGLOBIN g/dL 12.9 13.9 13.4   HEMATOCRIT % 39.6 41.1 41.0   PLATELETS 10*3/mm3 311 283 317     Results from last 7 days   Lab Units 05/08/20  0641   SODIUM mmol/L 140   POTASSIUM mmol/L 4.3   CHLORIDE mmol/L 100   CO2 mmol/L 27.0   BUN mg/dL 15   CREATININE mg/dL 0.65   GLUCOSE mg/dL 116*   CALCIUM mg/dL 8.4*     Results from last 7 days   Lab Units 05/08/20  0641   ALK PHOS U/L 43   BILIRUBIN mg/dL 0.2   ALT (SGPT) U/L 16   AST (SGOT) U/L 23         Results from last 7 days   Lab Units 05/04/20  0408   CRP mg/dL 0.66*                 Estimated Creatinine Clearance: 104.6 mL/min (by C-G formula based on SCr of 0.65 mg/dL).      Microbiology:  No results found for: ACANTHNAEG, AFBCX, BPERTUSSISCX, BLOODCX  No results found for: BCIDPCR, CXREFLEX, CSFCX, CULTURETIS  No results found for: CULTURES, HSVCX, URCX  No results found for: EYECULTURE, GCCX, LABHSV  No results found for: LEGIONELLA, MRSACX, MUMPSCX, MYCOPLASCX  No results found for: NOCARDIACX, STOOLCX  No results found for: THROATCX, UNSTIMCULT, URINECX, CULTURE, VZVCULTUR  No results found for: VIRALCULTU, WOUNDCX        Radiology:  Imaging Results (Last 72 Hours)     Procedure Component Value Units Date/Time    XR Chest 1 View [[id number]] Collected:  05/08/20 0708     Updated:  05/08/20 0710    Narrative:       CR Chest 1 Vw    INDICATION:   Acute respiratory failure with hypoxia and chest pain. COVID 19      COMPARISON:    5/4/2020    FINDINGS:  Single portable AP view(s) of the chest.    Pacing device overlying the left aspect.    The heart size is mildly enlarged. Mediastinal structures are midline.    Redemonstrated are bilateral patchy infiltrates, right side greater than left. No obvious pleural fluid. No pneumothorax.       Impression:       1.  Mild cardiomegaly.    2.  Patchy bilateral airspace disease, right side greater than left. Slight interval worsening when compared to prior exam.    Signer Name: Jonnathan Fleming MD   Signed: 5/8/2020 7:08 AM   Workstation Name: [id number]    [facility]            Impression:   CO VID 19 pneumonitis  Hypoxia  Acute respiratory failure improving  Probable cytokine storm manifested by elevated ferritin elevated LDH, hypoxia  Penicillin allergy; questionable throat swelling  Doxycycline intolerance     Patient has received Actemra on 4/29  Received 2 units of serum convalescent plasma therapy on 4/30    PLAN/RECOMMENDATIONS:   Thank you for asking us to see Marlene Aguirre, I recommend the following:      Of note COVID-19 can have complications including myocarditis and probably pericarditis.    Patient is going to be eval by cardiology because of chest pain.    Given the patient's age and obesity patient is probably at risk for some sort of coronary artery disease with angina  Agree with Lovenox for DVT prophylaxis  Continue pravastatin for cardioprotection        Cytokine storm appears to be resolving    IL-6 level is 130 on 4/30/2020 prior to dose of Actemra    5/6/20 ferritin and LDH downtrending        Continue supplemental oxygen    Patient reluctant to go home because her  smokes and has dementia; patient may be a candidate for [facility] accepting patients    As suggested by Dr. Gill will repeat nasopharyngeal PCR for COVID-19; while expect this to stay positive would be interesting for placement and isolation purposes if  patient converted to negative PCR test    Braxton Horn MD  5/8/2020  14:46

## 2020-05-08 NOTE — PROGRESS NOTES
"    [facility] Medicine Services  PROGRESS NOTE    Patient Name: Marlene Aguirre  : 1955  MRN: [id number]    Date of Admission: 2020  Primary Care Physician: Provider, No Known    Subjective   Subjective     CC:  covid pneumonitis    HPI:  Had chest pain last night she feels likely was \"gas\". No pain now. Dyspnea slightly improved. No sputum. No fever. No n/v/d.    Review of Systems  No headache    Objective   Objective     Vital Signs:   Temp:  [97.1 °F (36.2 °C)-98.8 °F (37.1 °C)] 98.6 °F (37 °C)  Heart Rate:  [61-73] 64  Resp:  [16-18] 18  BP: (116-136)/(65-84) 130/84        With patient's consent, physical exam was conducted via visual telemedicine encounter w/ direct visualization of patient through window while simultaneously talking w/ patient on phone. This is done due to isolation requirements in the interest of PPE conservation.    Physical Exam:  Constitutional:Alert, oriented x 3, nontoxic appearing, obese, normal respiratory effort  Psych:Normal/appropriate affect  HEENT:Ncat, neck w/ full range of motion  Neuro: Face symmetric, speech clear, equal , moves all extremities  Cardiac: sinus rhythm on tele monitor; no pitting edema  Resp: symmetric chest rise and fall, normal respiratory effort at rest  GI: abd soft, obese, nontender (during self-palpation)  Skin: No extremity rash      Results Reviewed:  Results from last 7 days   Lab Units 20  0424 20  0556 20  0408   WBC 10*3/mm3 6.21 6.24 7.40   HEMOGLOBIN g/dL 12.9 13.9 13.4   HEMATOCRIT % 39.6 41.1 41.0   PLATELETS 10*3/mm3 311 283 317     Results from last 7 days   Lab Units 20  1043 20  0641 20  0556 20  0807   SODIUM mmol/L  --  140 135* 139   POTASSIUM mmol/L  --  4.3 4.2 4.0   CHLORIDE mmol/L  --  100 98 96*   CO2 mmol/L  --  27.0 26.0 30.0*   BUN mg/dL  --  15 16 23   CREATININE mg/dL  --  0.65 0.62 0.64   GLUCOSE mg/dL  --  116* 97 100*   CALCIUM mg/dL  --  8.4* " 8.7 8.7   ALT (SGPT) U/L  --  16  --   --    AST (SGOT) U/L  --  23  --   --    TROPONIN T ng/mL <0.010 <0.010  --   --    PROBNP pg/mL  --  84.0  --   --      Estimated Creatinine Clearance: 104.6 mL/min (by C-G formula based on SCr of 0.65 mg/dL).    Microbiology Results Abnormal     Procedure Component Value - Date/Time    CORONAVIRUS (COVID-19),RT-PCR,[place] [facility], NP SWAB IN [facility]EX[facility]INE MEDIA - Swab, Nasopharynx [[id number]]  (Abnormal) Collected:  05/07/20 1425    Lab Status:  Final result Specimen:  Swab from Nasopharynx Updated:  05/08/20 1636     Reference Lab Report --     Comment: See scanned report          COVID19 Detected    Blood Culture - Blood, Arm, Right [[id number]] Collected:  04/29/20 1600    Lab Status:  Final result Specimen:  Blood from Arm, Right Updated:  05/04/20 1615     Blood Culture No growth at 5 days    Blood Culture - Blood, Arm, Right [[id number]] Collected:  04/29/20 1550    Lab Status:  Final result Specimen:  Blood from Arm, Right Updated:  05/04/20 1615     Blood Culture No growth at 5 days    Legionella Antigen, Urine - Urine, Urine, Clean Catch [[id number]]  (Normal) Collected:  04/30/20 0911    Lab Status:  Final result Specimen:  Urine, Clean Catch Updated:  04/30/20 1302     LEGIONELLA ANTIGEN, URINE Negative    S. Pneumo Ag Urine or CSF - Urine, Urine, Clean Catch [[id number]]  (Normal) Collected:  04/30/20 0911    Lab Status:  Final result Specimen:  Urine, Clean Catch Updated:  04/30/20 1302     Strep Pneumo Ag Negative    MRSA Screen, PCR (Inpatient) - Swab, Nares [[id number]]  (Normal) Collected:  04/29/20 2157    Lab Status:  Final result Specimen:  Swab from Nares Updated:  04/30/20 0037     MRSA PCR Negative    Narrative:       MRSA Negative    SARS-CoV-2 PCR ([place] IN-HOUSE PERFORMED), NP SWAB IN TRANSPORT MEDIA - Swab, Nasopharynx [[id number]]  (Abnormal) Collected:  04/29/20 1644    Lab Status:  Final result Specimen:  Swab from Nasopharynx Updated:  04/30/20 0003      COVID19 Detected    Respiratory Panel, PCR - Swab, Nasopharynx [[id number]]  (Normal) Collected:  04/29/20 1644    Lab Status:  Final result Specimen:  Swab from Nasopharynx Updated:  04/29/20 1802     ADENOVIRUS, PCR Not Detected     Coronavirus 229E Not Detected     Coronavirus HKU1 Not Detected     Coronavirus NL63 Not Detected     Coronavirus OC43 Not Detected     Human Metapneumovirus Not Detected     Human Rhinovirus/Enterovirus Not Detected     Influenza B PCR Not Detected     Parainfluenza Virus 1 Not Detected     Parainfluenza Virus 2 Not Detected     Parainfluenza Virus 3 Not Detected     Parainfluenza Virus 4 Not Detected     Bordetella pertussis pcr Not Detected     Influenza A H1 2009 PCR Not Detected     Chlamydophila pneumoniae PCR Not Detected     Mycoplasma pneumo by PCR Not Detected     Influenza A PCR Not Detected     Influenza A H3 Not Detected     Influenza A H1 Not Detected     RSV, PCR Not Detected     Bordetella parapertussis PCR Not Detected    Narrative:       The coronavirus on the RVP is NOT COVID-19 and is NOT indicative of infection with COVID-19.           Imaging Results (Last 24 Hours)     Procedure Component Value Units Date/Time    XR Chest 1 View [[id number]] Collected:  05/08/20 0708     Updated:  05/08/20 0710    Narrative:       CR Chest 1 Vw    INDICATION:   Acute respiratory failure with hypoxia and chest pain. COVID 19     COMPARISON:    5/4/2020    FINDINGS:  Single portable AP view(s) of the chest.    Pacing device overlying the left aspect.    The heart size is mildly enlarged. Mediastinal structures are midline.    Redemonstrated are bilateral patchy infiltrates, right side greater than left. No obvious pleural fluid. No pneumothorax.       Impression:       1.  Mild cardiomegaly.    2.  Patchy bilateral airspace disease, right side greater than left. Slight interval worsening when compared to prior exam.    Signer Name: Jonnathan Fleming MD   Signed: 5/8/2020 7:08 AM    Workstation Name: [id number]    [facility]          Results for orders placed during the hospital encounter of 04/29/20   Adult Transthoracic Echo Complete W/ Cont if Necessary Per Protocol    Narrative · Calculated EF = 59.0%.  · Left ventricular wall thickness is consistent with mild concentric   hypertrophy.  · Left ventricular systolic function is normal.  · Left ventricular diastolic dysfunction (grade I) consistent with   impaired relaxation.  · No significant structural or functional valvular disease.          I have reviewed the medications:  Scheduled Meds:    atenolol 25 mg Oral Daily   dilTIAZem  mg Oral Daily   enoxaparin 1 mg/kg Subcutaneous Q12H   furosemide 20 mg Oral Daily   furosemide 20 mg Oral Once   pantoprazole 40 mg Oral Q AM   pravastatin 40 mg Oral Nightly   sodium chloride 10 mL Intravenous Q12H     Continuous Infusions:   PRN Meds:.•  acetaminophen  •  albuterol sulfate HFA  •  calcium carbonate EX  •  diphenhydrAMINE  •  magnesium sulfate **OR** magnesium sulfate in D5W 1g/100mL (PREMIX) **OR** magnesium sulfate  •  potassium chloride  •  sodium chloride  •  sodium chloride  •  sodium chloride    Assessment/Plan   Assessment & Plan     Active Hospital Problems    Diagnosis  POA   • **COVID-19 Pneumonitis [U07.1, J12.89]  Yes   • Acute hypoxemic respiratory failure (CMS/HCC) [J96.01]  Yes     Priority: High   • Fever [R50.9]  Yes   • Paroxysmal atrial fibrillation (CMS/HCC) [I48.0]  Yes   • Obesity (BMI 30-39.9) [E66.9]  Yes      Resolved Hospital Problems   No resolved problems to display.        Brief Hospital Course to date:  Marlene Aguirre is a 64 y.o. female w/ hx afib/pacemaker (on xarelto anticoagulation, follows w/ Dr. Gaytan at [facility]), asthma, obesity who presented to [facility] ED on 4/29/20 with progressive cough, dyspnea, fever despite being placed on course of zithromax in outpatient setting. Upon arrival patient was hypoxic &  "febrile.Cxr showed patchy air space disease. D-dimer was mildly elevated, V/Q scan was low probability for pulmonary embolism. Covid-19 pcr testing was positive. Anticoagulation was changed from xarelto to therapeutic lovenox. In the hours after admission patient's oxygen requirements increased and patient was transferred to ICU where patient received actemra and convalescent plasma. Patient required up to 80% fio2 on hi-flow cannula but oxygen requirements improved and was transferred out to telemetry/covid unit on 5/7/20 with 3Lnc requirements. Inflammatory markers have downtrended. Early morning hours of 5/8/20 developed chest pressure which resolved after nitroglycerin, and patient also developed belching & \"gas pains\" which resolved w/ tums. troponins were negative, ekg unchanged. Echo showed normal EF w/ diastolic dysfunction but normal wall motion.      *Covid Pneumonitis   -s/p serum convalescent plasma therapy on 4/30/20  *s/p Cytokine Storm (due to above), improving   -s/c actemra on 4/29/20  *Acute hypoxic respiratory failure (due to above), improving  *Transient atypical chest pain (early morning 5/8/20), resolved   -ekg 5/8/20 unchanged from prior (twave flattening), troponins negative, echo normal EF & normal wall motion   -already anticoagulated on lovenox  *Hx Paroxysmal Afib/Hx pacemaker  *Obesity  *hx asthma  *Patient claims history of PCN allergy (questionable throat swelling); doxycycline intolerance    Plan:  -now chest pain free, was atypical w/ associated belching & \"gas\", now resolved. Echo was normal ef and wall motion, troponin was negative. Already anticoagulated w/ lovenox. Added protonix & tums.    -ID following, inflammatory markers downtrending (received actemra & convalescent plasma in ICU). Seems to be making slow but steady progress. Repeat covid-19 lexar test still + today.    -Will give extra lasix 20mg po x 1. Continue dilt & atenolol. Continue therapeutic lovenox for now, " anticipate transition back to [facility] at discharge    -I called patient's pcp (Dr. Ammy Leblanc cell [phone]) on 5/7/20 to update her regarding patient's hospital course and she appreciated the call. She wishes to be called on her cell when patient is being discharged home (from either the hospital or from rehab) to coordinate outpatient follow up.    Dispo: patient no longer feels comfortable going home on oxygen, as  has some early dementia & is a smoker. Making referrals to [facility] (as they are able to take patient's who are covid 19 +). Anticipate possible discharge to [facility] early next week if continues to clinically improve    DVT Prophylaxis:  Sq therapeutic lovenox    Daily Care Communication  Due to current limited visitation policies, an attempt will be made daily to update patient's identified best point-of-contact(s)   Contact: Cecilio Jones   Relation: son   Type of communication (phone or televideo): phone   Time of communication: 18:14   Notes (if applicable): appreciated the call     CODE STATUS:   Code Status and Medical Interventions:   Ordered at: 04/29/20 1943     Code Status:    CPR     Medical Interventions (Level of Support Prior to Arrest):    Full     40 min spent on patient care; >50% time spent coordinating care & discussing with consultant (Dr. Horn), discussing at multidisciplinary rounds, and updating/counseling son      Electronically signed by Rishi Gill MD, 05/08/20, 17:52.

## 2020-05-09 LAB
ANION GAP SERPL CALCULATED.3IONS-SCNC: 12 MMOL/L (ref 5–15)
BUN BLD-MCNC: 15 MG/DL (ref 8–23)
BUN/CREAT SERPL: 23.8 (ref 7–25)
CALCIUM SPEC-SCNC: 8.6 MG/DL (ref 8.6–10.5)
CHLORIDE SERPL-SCNC: 99 MMOL/L (ref 98–107)
CO2 SERPL-SCNC: 28 MMOL/L (ref 22–29)
CREAT BLD-MCNC: 0.63 MG/DL (ref 0.57–1)
GFR SERPL CREATININE-BSD FRML MDRD: 95 ML/MIN/1.73
GLUCOSE BLD-MCNC: 101 MG/DL (ref 65–99)
MAGNESIUM SERPL-MCNC: 1.9 MG/DL (ref 1.6–2.4)
POTASSIUM BLD-SCNC: 4.3 MMOL/L (ref 3.5–5.2)
SODIUM BLD-SCNC: 139 MMOL/L (ref 136–145)

## 2020-05-09 PROCEDURE — 63710000001 DIPHENHYDRAMINE PER 50 MG: Performed by: INTERNAL MEDICINE

## 2020-05-09 PROCEDURE — 83735 ASSAY OF MAGNESIUM: CPT | Performed by: INTERNAL MEDICINE

## 2020-05-09 PROCEDURE — 99233 SBSQ HOSP IP/OBS HIGH 50: CPT | Performed by: HOSPITALIST

## 2020-05-09 PROCEDURE — 25010000002 ENOXAPARIN PER 10 MG: Performed by: INTERNAL MEDICINE

## 2020-05-09 PROCEDURE — 80048 BASIC METABOLIC PNL TOTAL CA: CPT | Performed by: INTERNAL MEDICINE

## 2020-05-09 RX ORDER — ACETAMINOPHEN, ASPIRIN AND CAFFEINE 250; 250; 65 MG/1; MG/1; MG/1
2 TABLET, FILM COATED ORAL EVERY 8 HOURS PRN
Status: DISCONTINUED | OUTPATIENT
Start: 2020-05-09 | End: 2020-05-09

## 2020-05-09 RX ORDER — HYDROCODONE BITARTRATE AND ACETAMINOPHEN 5; 325 MG/1; MG/1
1 TABLET ORAL ONCE
Status: COMPLETED | OUTPATIENT
Start: 2020-05-09 | End: 2020-05-09

## 2020-05-09 RX ADMIN — ENOXAPARIN SODIUM 120 MG: 120 INJECTION SUBCUTANEOUS at 08:32

## 2020-05-09 RX ADMIN — DIPHENHYDRAMINE HYDROCHLORIDE 50 MG: 50 CAPSULE ORAL at 20:45

## 2020-05-09 RX ADMIN — HYDROCODONE BITARTRATE AND ACETAMINOPHEN 1 TABLET: 5; 325 TABLET ORAL at 21:45

## 2020-05-09 RX ADMIN — ACETAMINOPHEN 650 MG: 325 TABLET, FILM COATED ORAL at 18:39

## 2020-05-09 RX ADMIN — DILTIAZEM HYDROCHLORIDE 240 MG: 240 CAPSULE, COATED, EXTENDED RELEASE ORAL at 08:33

## 2020-05-09 RX ADMIN — FUROSEMIDE 20 MG: 20 TABLET ORAL at 08:33

## 2020-05-09 RX ADMIN — ACETAMINOPHEN 650 MG: 325 TABLET, FILM COATED ORAL at 08:33

## 2020-05-09 RX ADMIN — SODIUM CHLORIDE, PRESERVATIVE FREE 10 ML: 5 INJECTION INTRAVENOUS at 08:33

## 2020-05-09 RX ADMIN — PRAVASTATIN SODIUM 40 MG: 40 TABLET ORAL at 20:16

## 2020-05-09 RX ADMIN — PANTOPRAZOLE SODIUM 40 MG: 40 TABLET, DELAYED RELEASE ORAL at 06:24

## 2020-05-09 RX ADMIN — SODIUM CHLORIDE, PRESERVATIVE FREE 10 ML: 5 INJECTION INTRAVENOUS at 20:15

## 2020-05-09 RX ADMIN — ATENOLOL 25 MG: 25 TABLET ORAL at 08:33

## 2020-05-09 RX ADMIN — ENOXAPARIN SODIUM 120 MG: 120 INJECTION SUBCUTANEOUS at 20:15

## 2020-05-09 NOTE — PROGRESS NOTES
[facility] Medicine Services  PROGRESS NOTE    Patient Name: Marlene Aguirre  : 1955  MRN: [id number]    Date of Admission: 2020  Primary Care Physician: Provider, No Known    Subjective   Subjective     CC:  F/U COVID-19 infection    HPI:  Patient seen this morning, interviewed via telephone. She complains of a headache and feeling tired, but overall her breathing is much better. Still on 3 liters oxygen. She has been working with PT and is hopeful to work with them again this weekend.    Review of Systems  Gen-no fevers, no chills  CV-no chest pain, no palpitations  Resp-+mild dry cough, no dyspnea  GI-no N/V/D, no abd pain    All other systems reviewed and negative except any additional pertinent positives and negatives as discussed in HPI.      Objective   Objective     Vital Signs:   Temp:  [96.7 °F (35.9 °C)-98.7 °F (37.1 °C)] 97.9 °F (36.6 °C)  Heart Rate:  [59-73] 73  Resp:  [14-18] 16  BP: ()/(64-84) 108/69        Physical Exam:  With patient's consent, physical exam was conducted via visual telemedicine encounter with bedside portion accommodated by nursing staff due to patient's current isolation requirements in the interest of PPE conservation.    Constitutional: No acute distress, awake, alert, nontoxic, normal body habitus, sitting up in bed  Respiratory: good effort, nonlabored respirations on 3 L  Cardiovascular:  tele with NSR  Musculoskeletal: No edema, normal muscle tone and mass for age  GI: Soft, no grimace, nondistended  Psychiatric: Appropriate affect, good insight and judgement, cooperative  Neurologic: Oriented x 3, movements symmetric BUE and BLE, speech clear and fluent      Results Reviewed:  Results from last 7 days   Lab Units 20  0424 20  0556 20  0408   WBC 10*3/mm3 6.21 6.24 7.40   HEMOGLOBIN g/dL 12.9 13.9 13.4   HEMATOCRIT % 39.6 41.1 41.0   PLATELETS 10*3/mm3 311 283 317     Results from last 7 days   Lab Units  05/09/20  0542 05/08/20  1043 05/08/20  0641 05/07/20  0556   SODIUM mmol/L 139  --  140 135*   POTASSIUM mmol/L 4.3  --  4.3 4.2   CHLORIDE mmol/L 99  --  100 98   CO2 mmol/L 28.0  --  27.0 26.0   BUN mg/dL 15  --  15 16   CREATININE mg/dL 0.63  --  0.65 0.62   GLUCOSE mg/dL 101*  --  116* 97   CALCIUM mg/dL 8.6  --  8.4* 8.7   ALT (SGPT) U/L  --   --  16  --    AST (SGOT) U/L  --   --  23  --    TROPONIN T ng/mL  --  <0.010 <0.010  --    PROBNP pg/mL  --   --  84.0  --      Estimated Creatinine Clearance: 108 mL/min (by C-G formula based on SCr of 0.63 mg/dL).    Microbiology Results Abnormal     Procedure Component Value - Date/Time    CORONAVIRUS (COVID-19),RT-PCR,[facility], NP SWAB IN [facility]IA - Swab, Nasopharynx [[id number]]  (Abnormal) Collected:  05/07/20 1425    Lab Status:  Final result Specimen:  Swab from Nasopharynx Updated:  05/08/20 1636     Reference Lab Report --     Comment: See scanned report          COVID19 Detected    Blood Culture - Blood, Arm, Right [[id number]] Collected:  04/29/20 1600    Lab Status:  Final result Specimen:  Blood from Arm, Right Updated:  05/04/20 1615     Blood Culture No growth at 5 days    Blood Culture - Blood, Arm, Right [[id number]] Collected:  04/29/20 1550    Lab Status:  Final result Specimen:  Blood from Arm, Right Updated:  05/04/20 1615     Blood Culture No growth at 5 days    Legionella Antigen, Urine - Urine, Urine, Clean Catch [[id number]]  (Normal) Collected:  04/30/20 0911    Lab Status:  Final result Specimen:  Urine, Clean Catch Updated:  04/30/20 1302     LEGIONELLA ANTIGEN, URINE Negative    S. Pneumo Ag Urine or CSF - Urine, Urine, Clean Catch [[id number]]  (Normal) Collected:  04/30/20 0911    Lab Status:  Final result Specimen:  Urine, Clean Catch Updated:  04/30/20 1302     Strep Pneumo Ag Negative    MRSA Screen, PCR (Inpatient) - Swab, Nares [[id number]]  (Normal) Collected:  04/29/20 2158    Lab Status:  Final result Specimen:  Swab from  Nares Updated:  04/30/20 0037     MRSA PCR Negative    Narrative:       MRSA Negative    SARS-CoV-2 PCR ([place] IN-HOUSE PERFORMED), NP SWAB IN TRANSPORT MEDIA - Swab, Nasopharynx [[id number]]  (Abnormal) Collected:  04/29/20 1644    Lab Status:  Final result Specimen:  Swab from Nasopharynx Updated:  04/30/20 0003     COVID19 Detected    Respiratory Panel, PCR - Swab, Nasopharynx [[id number]]  (Normal) Collected:  04/29/20 1644    Lab Status:  Final result Specimen:  Swab from Nasopharynx Updated:  04/29/20 1802     ADENOVIRUS, PCR Not Detected     Coronavirus 229E Not Detected     Coronavirus HKU1 Not Detected     Coronavirus NL63 Not Detected     Coronavirus OC43 Not Detected     Human Metapneumovirus Not Detected     Human Rhinovirus/Enterovirus Not Detected     Influenza B PCR Not Detected     Parainfluenza Virus 1 Not Detected     Parainfluenza Virus 2 Not Detected     Parainfluenza Virus 3 Not Detected     Parainfluenza Virus 4 Not Detected     Bordetella pertussis pcr Not Detected     Influenza A H1 2009 PCR Not Detected     Chlamydophila pneumoniae PCR Not Detected     Mycoplasma pneumo by PCR Not Detected     Influenza A PCR Not Detected     Influenza A H3 Not Detected     Influenza A H1 Not Detected     RSV, PCR Not Detected     Bordetella parapertussis PCR Not Detected    Narrative:       The coronavirus on the RVP is NOT COVID-19 and is NOT indicative of infection with COVID-19.           Imaging Results (Last 24 Hours)     ** No results found for the last 24 hours. **          Results for orders placed during the hospital encounter of 04/29/20   Adult Transthoracic Echo Complete W/ Cont if Necessary Per Protocol    Narrative · Calculated EF = 59.0%.  · Left ventricular wall thickness is consistent with mild concentric   hypertrophy.  · Left ventricular systolic function is normal.  · Left ventricular diastolic dysfunction (grade I) consistent with   impaired relaxation.  · No significant structural  or functional valvular disease.          I have reviewed the medications:  Scheduled Meds:  atenolol 25 mg Oral Daily   dilTIAZem  mg Oral Daily   enoxaparin 1 mg/kg Subcutaneous Q12H   furosemide 20 mg Oral Daily   pantoprazole 40 mg Oral Q AM   pravastatin 40 mg Oral Nightly   sodium chloride 10 mL Intravenous Q12H     Continuous Infusions:   PRN Meds:.•  acetaminophen  •  albuterol sulfate HFA  •  aspirin-acetaminophen-caffeine  •  calcium carbonate EX  •  diphenhydrAMINE  •  magnesium sulfate **OR** magnesium sulfate in D5W 1g/100mL (PREMIX) **OR** magnesium sulfate  •  potassium chloride  •  sodium chloride  •  sodium chloride  •  sodium chloride    Assessment/Plan   Assessment & Plan     Active Hospital Problems    Diagnosis  POA   • **COVID-19 Pneumonitis [U07.1, J12.89]  Yes   • Acute hypoxemic respiratory failure (CMS/HCC) [J96.01]  Yes   • Fever [R50.9]  Yes   • Paroxysmal atrial fibrillation (CMS/HCC) [I48.0]  Yes   • Obesity (BMI 30-39.9) [E66.9]  Yes      Resolved Hospital Problems   No resolved problems to display.        Brief Hospital Course to date:  Marlene Aguirre is a 64 y.o. female with hx of Afib on Xarelto, pacemaker, asthma, and obesity who presents to the ER on 4/29/20 due to progressive cough, dyspnea, and fever despite being placed on Zithromax as an outpatient. She was hypoxic and febrile on arrival. CXR showed patchy airspace disease in the right mid and lower lung, and D-dimer was mildly elevated. V/Q scan was low probability for PE. Subsequent CT chest without contrast showed extensive multifocal regions of moderately dense, patchy ground glass opacities throughout both lungs, typical for COVID-19. Her COVID-19 PCR returned POSITIVE. In the hours following hospital admission, patient had worsening O2 requirements (up to 6 liters) and was transferred to the ICU. She received a dose of Actemra to prevent cytokine storm as well as convalescent plasma per Infectious Disease  recommendations. Patient required up to 80% high flow nasal cannula but improved down to 3 liters and was transferred to the floor with hospitalist service resuming care on 5/7. In the early morning hours on 5/8, she had chest pressure which resolved after nitroglycerin as well as gas pains and belching which resolved with Tums. EKG and troponins were negative and Echo was unremarkable aside from diastolic dysfunction.    Of note, patient's sister is currently hospitalized with COVID-19 as well. Patient reports her son as well as granddaughter also have tested positive. It seems there was a physical therapist who had come to work with patient's son during home PT who had tested positive, so likely the initial source of transmission.    *All problems are new to me today.    COVID-19 pneumonitis  Acute hypoxic respiratory failure  --s/p Actemra (4/29) and convalescent plasma (4/30).  --ID following. Inflammatory markers trending down.  --Now on 3 liters. Continue to wean as tolerated. Encouraged IS.  --Mobilize with PT/OT as tolerated.    Transient atypical chest pain 5/8/20  --EKG and troponins unremarkable. Echo 5/8 personally reviewed and unremarkable aside from diastolic dysfunction.   --Now chest pain free, likely GI related (improved with Tums). Continue Protonix and Tums.    Hx of PAF  Hx of pacemaker  --Previously on Xarelto, will resume at discharge.  --Continue Diltiazem and Atenolol.    Headache, persistent  --Tylenol not helping much.  --Will try PRN Excedrin x 2 days.     DVT Prophylaxis:  Lovenox    (My partner Dr. Gill previously spoke with patient's PCP Dr. Ammy Leblanc (cell [phone]) on 5/7/20 with update on patient's condition. She wishes to be called when patient is being discharged home from rehab so she can coordinate close outpatient follow up. Will need to make that clear in discharge instructions/summary.)    Patient declined family phone call today. No new updates. Waiting for [facility]  approval hopefully early next week.    Disposition: I expect the patient to be discharged to [facility] early next week    CODE STATUS:   Code Status and Medical Interventions:   Ordered at: 04/29/20 1943     Code Status:    CPR     Medical Interventions (Level of Support Prior to Arrest):    Full         Electronically signed by Shannan Etienne MD, 05/09/20, 15:17.

## 2020-05-09 NOTE — PLAN OF CARE
VSS, no c/o CP this shift. Has c/o headache PRN tylenol given as ordered. Remains on 3L/nc. Will continue current plan of care and to monitor patient.

## 2020-05-09 NOTE — PLAN OF CARE
VSS, 3L nasal cannula.  No complaints of pain. Pt currently resting, no signs of discomfort. Will continue to monitor.

## 2020-05-10 PROCEDURE — 99233 SBSQ HOSP IP/OBS HIGH 50: CPT | Performed by: HOSPITALIST

## 2020-05-10 PROCEDURE — 25010000002 ENOXAPARIN PER 10 MG: Performed by: INTERNAL MEDICINE

## 2020-05-10 PROCEDURE — 97530 THERAPEUTIC ACTIVITIES: CPT

## 2020-05-10 PROCEDURE — 25010000002 MAGNESIUM SULFATE 2 GM/50ML SOLUTION: Performed by: INTERNAL MEDICINE

## 2020-05-10 PROCEDURE — 63710000001 DIPHENHYDRAMINE PER 50 MG: Performed by: INTERNAL MEDICINE

## 2020-05-10 PROCEDURE — 97110 THERAPEUTIC EXERCISES: CPT

## 2020-05-10 RX ORDER — HYDROCODONE BITARTRATE AND ACETAMINOPHEN 5; 325 MG/1; MG/1
1 TABLET ORAL EVERY 8 HOURS PRN
Status: DISCONTINUED | OUTPATIENT
Start: 2020-05-10 | End: 2020-05-12 | Stop reason: HOSPADM

## 2020-05-10 RX ORDER — GUAIFENESIN 600 MG/1
600 TABLET, EXTENDED RELEASE ORAL EVERY 12 HOURS SCHEDULED
Status: DISCONTINUED | OUTPATIENT
Start: 2020-05-10 | End: 2020-05-12 | Stop reason: HOSPADM

## 2020-05-10 RX ADMIN — HYDROCODONE BITARTRATE AND ACETAMINOPHEN 1 TABLET: 5; 325 TABLET ORAL at 14:06

## 2020-05-10 RX ADMIN — SODIUM CHLORIDE, PRESERVATIVE FREE 10 ML: 5 INJECTION INTRAVENOUS at 20:03

## 2020-05-10 RX ADMIN — ENOXAPARIN SODIUM 120 MG: 120 INJECTION SUBCUTANEOUS at 09:50

## 2020-05-10 RX ADMIN — ENOXAPARIN SODIUM 120 MG: 120 INJECTION SUBCUTANEOUS at 20:01

## 2020-05-10 RX ADMIN — FUROSEMIDE 20 MG: 20 TABLET ORAL at 09:50

## 2020-05-10 RX ADMIN — PANTOPRAZOLE SODIUM 40 MG: 40 TABLET, DELAYED RELEASE ORAL at 05:55

## 2020-05-10 RX ADMIN — DIPHENHYDRAMINE HYDROCHLORIDE 50 MG: 50 CAPSULE ORAL at 13:23

## 2020-05-10 RX ADMIN — GUAIFENESIN 600 MG: 600 TABLET, EXTENDED RELEASE ORAL at 20:01

## 2020-05-10 RX ADMIN — PRAVASTATIN SODIUM 40 MG: 40 TABLET ORAL at 20:01

## 2020-05-10 RX ADMIN — GUAIFENESIN 600 MG: 600 TABLET, EXTENDED RELEASE ORAL at 14:06

## 2020-05-10 RX ADMIN — MAGNESIUM SULFATE 2 G: 2 INJECTION INTRAVENOUS at 09:50

## 2020-05-10 RX ADMIN — ACETAMINOPHEN 650 MG: 325 TABLET, FILM COATED ORAL at 15:44

## 2020-05-10 RX ADMIN — DILTIAZEM HYDROCHLORIDE 240 MG: 240 CAPSULE, COATED, EXTENDED RELEASE ORAL at 09:50

## 2020-05-10 RX ADMIN — ATENOLOL 25 MG: 25 TABLET ORAL at 09:50

## 2020-05-10 RX ADMIN — SODIUM CHLORIDE, PRESERVATIVE FREE 10 ML: 5 INJECTION INTRAVENOUS at 09:50

## 2020-05-10 NOTE — PLAN OF CARE
Patient resting with no current complaints. Vss apaced. Will attempt to wean o2. Up to the bsc very well. Marie Guzman RN

## 2020-05-10 NOTE — THERAPY TREATMENT NOTE
Acute Care - Occupational Therapy Treatment Note  [facility]     Patient Name: Marlene Aguirre  : 1955  MRN: [id number]  Today's Date: 5/10/2020             Admit Date: 2020       ICD-10-CM ICD-9-CM   1. Acute hypoxemic respiratory failure (CMS/HCC) J96.01 518.81   2. COVID-19 virus infection U07.1      Patient Active Problem List   Diagnosis   • Acute hypoxemic respiratory failure (CMS/HCC)   • COVID-19 Pneumonitis   • Fever   • Paroxysmal atrial fibrillation (CMS/HCC)   • Chronic anticoagulation   • History of pacemaker   • Multiple allergies (ct scan iv contrast, pcn, keflex, quinalones, tetracyclines, kenalog)   • Obesity (BMI 30-39.9)   • Asthma     Past Medical History:   Diagnosis Date   • Asthma    • Atrial fibrillation (CMS/HCC)    • Coronary artery disease    • Hypertension      Past Surgical History:   Procedure Laterality Date   • BILATERAL BREAST REDUCTION     • BRAIN SURGERY     • DILATATION AND CURETTAGE     • PACEMAKER IMPLANTATION         Therapy Treatment    Rehabilitation Treatment Summary     Row Name 05/10/20 1500             Treatment Time/Intention    Discipline  occupational therapist  -LIA      Document Type  therapy note (daily note)  -LIA      Subjective Information  complains of;pain;fatigue  -LIA      Mode of Treatment  individual therapy;occupational therapy  -LIA      Therapy Frequency (OT Eval)  daily  -LIA      Patient Effort  good  -LIA      Existing Precautions/Restrictions  fall;oxygen therapy device and L/min  -LIA      Equipment Issued to Patient  bedside commode;dressing equipment  -LIA      Recorded by [LIA] Natalia Garcia, OT 05/10/20 1535      Row Name 05/10/20 1500             Vital Signs    Post Systolic BP Rehab  108  -LIA      Post Treatment Diastolic BP  86  -LIA      Posttreatment Heart Rate (beats/min)  75  -LIA      O2 Delivery Pre Treatment  -- could not get to read accurately so new monitor placed.  -LIA      Post SpO2 (%)  94  -LIA      O2 Delivery Post  Treatment  supplemental O2  -LIA      Pre Patient Position  Supine  -LIA      Intra Patient Position  Sitting standing  -LIA      Post Patient Position  Supine  -LIA      Recorded by [LIA] Natalia Garcia, OT 05/10/20 1535      Row Name 05/10/20 1500             Cognitive Assessment/Intervention- PT/OT    Affect/Mental Status (Cognitive)  WFL  -LIA      Orientation Status (Cognition)  oriented to;person  -LIA      Follows Commands (Cognition)  WFL  -LIA      Recorded by [LIA] Natalia Garcia, OT 05/10/20 1535      Row Name 05/10/20 1500             Safety Issues, Functional Mobility    Impairments Affecting Function (Mobility)  balance;endurance/activity tolerance;strength;shortness of breath  -LIA      Recorded by [LIA] Natalia Garcia, OT 05/10/20 1535      Row Name 05/10/20 1500             Bed Mobility Assessment/Treatment    Bed Mobility Assessment/Treatment  scooting/bridging;sit-supine;supine-sit  -LIA      Scooting/Bridging [place] (Bed Mobility)  conditional independence  -LIA      Supine-Sit [place] (Bed Mobility)  conditional independence  -LIA      Sit-Supine [place] (Bed Mobility)  conditional independence  -LIA      Assistive Device (Bed Mobility)  bed rails;head of bed elevated  -LIA      Recorded by [LIA] Natalia Garcia, OT 05/10/20 1535      Row Name 05/10/20 1500             Functional Mobility    Functional Mobility- Ind. Level  contact guard assist  -LIA      Functional Mobility- Device  rolling walker  -LIA      Functional Mobility-Distance (Feet)  30  -LIA      Functional Mobility- Safety Issues  step length decreased;supplemental O2;weight-shifting ability decreased  -LIA      Recorded by [LIA] Natalia Garcia, OT 05/10/20 1535      Row Name 05/10/20 1500             Transfer Assessment/Treatment    Transfer Assessment/Treatment  sit-stand transfer;stand-sit transfer  -LIA      Recorded by [LIA] Natalia Garcia, OT 05/10/20 1535      Row Name 05/10/20 1500             Sit-Stand Transfer    Sit-Stand  Alpha (Transfers)  stand by assist  -LIA      Assistive Device (Sit-Stand Transfers)  walker, front-wheeled  -LIA      Recorded by [LIA] Natalia Garcia, OT 05/10/20 1535      Row Name 05/10/20 1500             Stand-Sit Transfer    Stand-Sit Alpha (Transfers)  stand by assist  -LAI      Assistive Device (Stand-Sit Transfers)  walker, front-wheeled  -LIA      Recorded by [LIA] Natalia Garcia, OT 05/10/20 1535      Row Name 05/10/20 1500             ADL Assessment/Intervention    97949 - OT Self Care/Mgmt Minutes  10  -LIA      BADL Assessment/Intervention  bathing;lower body dressing  -LIA      Comment, IADL Assessment/Training  pt. was able to use reacher to  multiple items from floor CI post education.  -LIA      Additional Documentation  Comment, IADL Assessment/Training (Row)  -LIA      Recorded by [LIA] Natalia Garcia, OT 05/10/20 1535      Row Name 05/10/20 1500             Bathing Assessment/Intervention    Comment (Bathing)  pt. was able to simulate use of LH bath sponge on LE's sitting EOB post education.  -LIA      Recorded by [LIA] Natalia Garcia, OT 05/10/20 1535      Row Name 05/10/20 1500             Lower Body Dressing Assessment/Training    Lower Body Dressing Alpha Level  doff;don;socks;independent  -LIA      Lower Body Dressing Position  edge of bed sitting;long sitting  -LIA      Comment (Lower Body Dressing)  donned EOB, doffed long sitting.  -LIA      Recorded by [LIA] Natalia Garcia, OT 05/10/20 1535      Row Name 05/10/20 1500             BADL Safety/Performance    Impairments, BADL Safety/Performance  balance;endurance/activity tolerance;strength;shortness of breath  -LIA      Skilled BADL Treatment/Intervention  adaptive equipment training  -LIA      Progress in BADL Status  independence level;use of adaptive equipment  -LIA      Recorded by [LIA] Natalia Garcia, OT 05/10/20 1535      Row Name 05/10/20 1500             Therapeutic Exercise    21404 - OT Therapeutic Exercise  Minutes  11  -LIA      72306 - OT Therapeutic Activity Minutes  10  -LIA      Recorded by [LIA] Natalia Garcia, OT 05/10/20 1535      Row Name 05/10/20 1500             Therapeutic Exercise    Upper Extremity Range of Motion (Therapeutic Exercise)  shoulder flexion/extension, bilateral;shoulder abduction/adduction, bilateral;shoulder horizontal abduction/adduction, bilateral;elbow flexion/extension, bilateral  -LIA      Exercise Type (Therapeutic Exercise)  AROM (active range of motion);resistive exercises  -LIA      Position (Therapeutic Exercise)  seated  -LIA      Sets/Reps (Therapeutic Exercise)  1/12  -LIA      Expected Outcome (Therapeutic Exercise)  improve functional tolerance, self-care activity;improve performance, BADLs;improve performance, transfer skills  -LIA      Comment (Therapeutic Exercise)  cues for AB, 3 short rest periods needed  -LIA      Recorded by [LIA] Natalia Garcia, KEDAR 05/10/20 1535      Row Name 05/10/20 1500             Balance    Balance  dynamic sitting balance  -LIA      Recorded by [LIA] Natalia Garcia OT 05/10/20 1535      Row Name 05/10/20 1500             Static Sitting Balance    Level of [place] (Unsupported Sitting, Static Balance)  independent  -LIA      Sitting Position (Unsupported Sitting, Static Balance)  sitting on edge of bed  -LIA      Recorded by [LIA] Natalia Garcia OT 05/10/20 1535      Row Name 05/10/20 1500             Dynamic Sitting Balance    Level of [place], Reaches Outside Midline (Sitting, Dynamic Balance)  supervision  -LIA      Sitting Position, Reaches Outside Midline (Sitting, Dynamic Balance)  sitting on edge of bed  -LIA      Recorded by [LIA] Natalia Garcia OT 05/10/20 1535      Row Name 05/10/20 1500             Static Standing Balance    Level of [place] (Supported Standing, Static Balance)  supervision  -LIA      Assistive Device Utilized (Supported Standing, Static Balance)  walker, rolling  -LIA      Recorded by [LIA] Natalia Garcia, OT  05/10/20 1535      Row Name 05/10/20 1500             Dynamic Standing Balance    Level of [place], Reaches Outside Midline (Standing, Dynamic Balance)  contact guard assist  -LIA      Assistive Device Utilized (Supported Standing, Dynamic Balance)  walker, rolling  -LIA      Recorded by [LIA] Natalia Garcia OT 05/10/20 1535      Row Name 05/10/20 1500             Positioning and Restraints    Pre-Treatment Position  in bed  -LIA      Post Treatment Position  bed  -LIA      In Bed  supine;call light within reach;encouraged to call for assist no alarm on with arrival  -LIA      Recorded by [LIA] Natalia Garcia, KEDAR 05/10/20 1535      Row Name 05/10/20 1500             Pain Assessment    Additional Documentation  Pain Scale: Numbers Pre/Post-Treatment (Group)  -LIA      Recorded by [LIA] Natalia Garcia OT 05/10/20 1535      Row Name 05/10/20 1500             Pain Scale: Numbers Pre/Post-Treatment    Pain Scale: Numbers, Pretreatment  7/10  -LIA      Pain Scale: Numbers, Post-Treatment  7/10  -LIA      Pain Location - Side  Bilateral  -LIA      Pain Location - Orientation  lower  -LIA      Pain Location  back  -LIA      Pain Intervention(s)  Ambulation/increased activity;Repositioned;Medication (See MAR) nurse had premedicated  -LIA      Recorded by [LIA] Natalia Garcia OT 05/10/20 1535      Row Name 05/10/20 1500             Outcome Summary/Treatment Plan (OT)    Daily Summary of Progress (OT)  progress toward functional goals is good  -LIA      Barriers to Overall Progress (OT)  endurance  -LIA      Plan for Continued Treatment (OT)  cont OT POC  -LIA      Anticipated Discharge Disposition (OT)  home with home health;home with assist  -LIA      Recorded by [LIA] Natalia Garcia, KEDAR 05/10/20 1535        User Key  (r) = Recorded By, (t) = Taken By, (c) = Cosigned By    Initials Name Effective Dates Discipline    LIA Natalia Garcia OT 06/08/18 -  OT           Rehab Goal Summary     [facility] Name 05/10/20 0093             Transfer  Goal 1 (OT)    Progress/Outcome (Transfer Goal 1, OT)  goal partially met met minus toilet  -LIA         Dressing Goal 1 (OT)    Progress/Outcome (Dressing Goal 1, OT)  goal met  -LIA         Toileting Goal 1 (OT)    Progress/Outcome (Toileting Goal 1, OT)  goal ongoing  -LIA          Activity Tolerance Goal 1 (OT)    Progress/Outcome (Activity Tolerance Goal 1, OT)  continuing progress toward goal met past 15 minutes but multiple rest  -LIA        User Key  (r) = Recorded By, (t) = Taken By, (c) = Cosigned By    Initials Name Provider Type Discipline    Natalia Tyson, OT Occupational Therapist OT        Occupational Therapy Education                 Title: PT OT SLP Therapies (Done)     Topic: Occupational Therapy (Done)     Point: ADL training (Done)     Description:   Instruct learner(s) on proper safety adaptation and remediation techniques during self care or transfers.   Instruct in proper use of assistive devices.              Learning Progress Summary           Patient Acceptance, E,D, VU,NR by  at 5/10/2020 1446    Comment:  AB, UE TE, AE use    Acceptance, E, VU by  at 5/10/2020 0031    Acceptance, E, NR by CL at 5/8/2020 1020    Acceptance, E, NR by  at 5/7/2020 0904                   Point: Home exercise program (Done)     Description:   Instruct learner(s) on appropriate technique for monitoring, assisting and/or progressing therapeutic exercises/activities.              Learning Progress Summary           Patient Acceptance, E,D, VU,NR by LIA at 5/10/2020 1446    Comment:  AB, UE TE, AE use    Acceptance, E, VU by  at 5/10/2020 0031                   Point: Precautions (Done)     Description:   Instruct learner(s) on prescribed precautions during self-care and functional transfers.              Learning Progress Summary           Patient Acceptance, E,D, VU,NR by  at 5/10/2020 1446    Comment:  AB, UE TE, AE use    Acceptance, E, VU by  at 5/10/2020 0031    Acceptance, E, NR by  at  5/8/2020 1020    Acceptance, E, NR by  at 5/7/2020 0904                   Point: Body mechanics (Done)     Description:   Instruct learner(s) on proper positioning and spine alignment during self-care, functional mobility activities and/or exercises.              Learning Progress Summary           Patient Acceptance, E,D, VU,NR by  at 5/10/2020 1446    Comment:  AB, UE TE, AE use    Acceptance, E, VU by  at 5/10/2020 0031    Acceptance, E, NR by  at 5/8/2020 1020    Acceptance, E, NR by  at 5/7/2020 0904                               User Key     Initials Effective Dates Name Provider Type Discipline     06/08/18 -  Natalia Garcia, OT Occupational Therapist OT     02/15/19 -  Meena Hutson RN Registered Nurse Nurse     04/03/18 -  Shadia Gama OT Occupational Therapist OT                OT Recommendation and Plan  Outcome Summary/Treatment Plan (OT)  Daily Summary of Progress (OT): progress toward functional goals is good  Barriers to Overall Progress (OT): endurance  Plan for Continued Treatment (OT): cont OT POC  Anticipated Discharge Disposition (OT): home with home health, home with assist  Therapy Frequency (OT Eval): daily  Daily Summary of Progress (OT): progress toward functional goals is good  Plan of Care Review  Plan of Care Reviewed With: patient  Plan of Care Reviewed With: patient  Outcome Summary: Pt. with improvement im many areas.  Pt. CI with all bed mobility, SBA with transfer from EOB, CGA mobility and I donning and doffing socks.  Pt. was able to complete 12 reps each UE TE with cues for AB.  Pt. still needs rest periods throughout session.  Outcome Measures     Row Name 05/10/20 1446 05/08/20 1020          How much help from another is currently needed...    Putting on and taking off regular lower body clothing?  3  -LIA  2  -CL     Bathing (including washing, rinsing, and drying)  2  -LIA  2  -CL     Toileting (which includes using toilet bed pan or urinal)  2  -LIA  1  -CL      Putting on and taking off regular upper body clothing  3  -LIA  2  -CL     Taking care of personal grooming (such as brushing teeth)  4  -LIA  3  -CL     Eating meals  4  -LIA  4  -CL     AM-PAC 6 Clicks Score (OT)  18  -LIA  14  -CL        Functional Assessment    Outcome Measure Options  AM-PAC 6 Clicks Daily Activity (OT)  -LIA  AM-PAC 6 Clicks Daily Activity (OT)  -CL       User Key  (r) = Recorded By, (t) = Taken By, (c) = Cosigned By    Initials Name Provider Type    Natalia Tyson OT Occupational Therapist    CL Shadia Gama OT Occupational Therapist           Time Calculation:   Time Calculation- OT     Row Name 05/10/20 1539 05/10/20 1500          Time Calculation- OT    OT Start Time  1446  -LIA  --     OT Received On  05/10/20  -LIA  --     OT Goal Re-Cert Due Date  05/17/20 -JB  --        Timed Charges    70476 - OT Therapeutic Exercise Minutes  --  11  -LIA     59482 - OT Therapeutic Activity Minutes  --  10  -LIA     99101 - OT Self Care/Mgmt Minutes  --  10  -LIA       User Key  (r) = Recorded By, (t) = Taken By, (c) = Cosigned By    Initials Name Provider Type    Natalia Tyson OT Occupational Therapist        Therapy Charges for Today     Code Description Service Date Service Provider Modifiers Qty    27070802349 HC OT THER PROC EA 15 MIN 5/10/2020 Natalia Garcia OT GO 1    92534264799 HC OT THERAPEUTIC ACT EA 15 MIN 5/10/2020 Natalia Garcia OT GO 1               Natalia Garcia OT  5/10/2020

## 2020-05-10 NOTE — PLAN OF CARE
Problem: Patient Care Overview  Goal: Plan of Care Review  Outcome: Ongoing (interventions implemented as appropriate)  Flowsheets (Taken 5/10/2020 4962)  Progress: improving  Plan of Care Reviewed With: patient  Outcome Summary: Pt. with improvement im many areas.  Pt. CI with all bed mobility, SBA with transfer from EOB, CGA mobility and I donning and doffing socks.  Pt. was able to complete 12 reps each UE TE with cues for AB.  Pt. still needs rest periods throughout session.

## 2020-05-10 NOTE — PLAN OF CARE
"  Problem: Patient Care Overview  Goal: Plan of Care Review  Outcome: Ongoing (interventions implemented as appropriate)  Flowsheets (Taken 5/10/2020 0300)  Progress: no change  Plan of Care Reviewed With: patient  Outcome Summary: No acute events over night. Pt remained on 2L NC. Pt with complaints of back pain \"sciatic nerve pain\". APRN notified and one time dose of norco 5 ordered and admnistered and pt appeared to rest well the rest of the night. Vital signs stable, will contninue to monitor.     Problem: Fall Risk (Adult)  Goal: Absence of Fall  Outcome: Ongoing (interventions implemented as appropriate)  Flowsheets (Taken 5/10/2020 0300)  Absence of Fall: achieves outcome     Problem: Pneumonia (Adult)  Goal: Signs and Symptoms of Listed Potential Problems Will be Absent, Minimized or Managed (Pneumonia)  Outcome: Ongoing (interventions implemented as appropriate)  Flowsheets (Taken 5/10/2020 0300)  Problems Assessed (Pneumonia): all  Problems Present (Pneumonia): none     Problem: ARDS (Acute Resp Distress Syndrome) (Adult)  Goal: Signs and Symptoms of Listed Potential Problems Will be Absent, Minimized or Managed (ARDS)  Outcome: Ongoing (interventions implemented as appropriate)  Flowsheets (Taken 5/10/2020 0300)  Problems Assessed (Acute Respiratory Distress Syndrome): all  Problems Present (ARDS): none     Problem: Breathing Pattern Ineffective (Adult)  Goal: Effective Oxygenation/Ventilation  Outcome: Ongoing (interventions implemented as appropriate)  Flowsheets (Taken 5/10/2020 0300)  Effective Oxygenation/Ventilation: making progress toward outcome     Problem: Breathing Pattern Ineffective (Adult)  Goal: Anxiety/Fear Reduction  Outcome: Ongoing (interventions implemented as appropriate)  Flowsheets (Taken 5/10/2020 0300)  Anxiety/Fear Reduction: making progress toward outcome     Problem: Skin Injury Risk (Adult)  Goal: Skin Health and Integrity  Outcome: Ongoing (interventions implemented as " appropriate)  Flowsheets (Taken 5/10/2020 0300)  Skin Health and Integrity: making progress toward outcome

## 2020-05-10 NOTE — PROGRESS NOTES
[facility] Medicine Services  PROGRESS NOTE    Patient Name: Marlene Aguirre  : 1955  MRN: [id number]    Date of Admission: 2020  Primary Care Physician: Provider, No Known    Subjective   Subjective     CC:  F/U COVID-19 infection    HPI:  Patient seen this morning. Headache a little better. Does complain of some sciatic nerve pain. Says she feels like she has thick phlegm that she can't get up.     Patient interviewed via telephone with direct visualization of patient through the window.    Review of Systems  Gen-no fevers, no chills  CV-no chest pain, no palpitations  Resp-+mild cough, no dyspnea  GI-no N/V/D, no abd pain    All other systems reviewed and negative except any additional pertinent positives and negatives as discussed in HPI.      Objective   Objective     Vital Signs:   Temp:  [96.2 °F (35.7 °C)-97.4 °F (36.3 °C)] 96.4 °F (35.8 °C)  Heart Rate:  [63-77] 77  Resp:  [16-18] 16  BP: (104-128)/(57-78) 125/78        Physical Exam:  With patient's consent, physical exam was conducted via visual telemedicine encounter with bedside portion accommodated by nursing staff due to patient's current isolation requirements in the interest of PPE conservation.    Constitutional: No acute distress, awake, alert, nontoxic, normal body habitus, sitting up in bed  Respiratory: good effort, nonlabored respirations on 2 L  Cardiovascular:  tele with NSR  Musculoskeletal: No edema, normal muscle tone and mass for age  GI: Soft, no grimace, nondistended  Psychiatric: Appropriate affect, good insight and judgement, cooperative  Neurologic: Oriented x 3, movements symmetric BUE and BLE, speech clear and fluent      Results Reviewed:  Results from last 7 days   Lab Units 20  0424 20  0556 20  0408   WBC 10*3/mm3 6.21 6.24 7.40   HEMOGLOBIN g/dL 12.9 13.9 13.4   HEMATOCRIT % 39.6 41.1 41.0   PLATELETS 10*3/mm3 311 283 317     Results from last 7 days   Lab Units  05/09/20  0542 05/08/20  1043 05/08/20  0641 05/07/20  0556   SODIUM mmol/L 139  --  140 135*   POTASSIUM mmol/L 4.3  --  4.3 4.2   CHLORIDE mmol/L 99  --  100 98   CO2 mmol/L 28.0  --  27.0 26.0   BUN mg/dL 15  --  15 16   CREATININE mg/dL 0.63  --  0.65 0.62   GLUCOSE mg/dL 101*  --  116* 97   CALCIUM mg/dL 8.6  --  8.4* 8.7   ALT (SGPT) U/L  --   --  16  --    AST (SGOT) U/L  --   --  23  --    TROPONIN T ng/mL  --  <0.010 <0.010  --    PROBNP pg/mL  --   --  84.0  --      Estimated Creatinine Clearance: 108 mL/min (by C-G formula based on SCr of 0.63 mg/dL).    Microbiology Results Abnormal     Procedure Component Value - Date/Time    CORONAVIRUS (COVID-19),RT-PCR,[facility], NP SWAB IN [facility]IA - Swab, Nasopharynx [[id number]]  (Abnormal) Collected:  05/07/20 1425    Lab Status:  Final result Specimen:  Swab from Nasopharynx Updated:  05/08/20 1636     Reference Lab Report --     Comment: See scanned report          COVID19 Detected    Blood Culture - Blood, Arm, Right [[id number]] Collected:  04/29/20 1600    Lab Status:  Final result Specimen:  Blood from Arm, Right Updated:  05/04/20 1615     Blood Culture No growth at 5 days    Blood Culture - Blood, Arm, Right [[id number]] Collected:  04/29/20 1550    Lab Status:  Final result Specimen:  Blood from Arm, Right Updated:  05/04/20 1615     Blood Culture No growth at 5 days    Legionella Antigen, Urine - Urine, Urine, Clean Catch [[id number]]  (Normal) Collected:  04/30/20 0911    Lab Status:  Final result Specimen:  Urine, Clean Catch Updated:  04/30/20 1302     LEGIONELLA ANTIGEN, URINE Negative    S. Pneumo Ag Urine or CSF - Urine, Urine, Clean Catch [[id number]]  (Normal) Collected:  04/30/20 0911    Lab Status:  Final result Specimen:  Urine, Clean Catch Updated:  04/30/20 1302     Strep Pneumo Ag Negative    MRSA Screen, PCR (Inpatient) - Swab, Nares [[id number]]  (Normal) Collected:  04/29/20 2152    Lab Status:  Final result Specimen:  Swab from  Nares Updated:  04/30/20 0037     MRSA PCR Negative    Narrative:       MRSA Negative    SARS-CoV-2 PCR (Center IN-HOUSE PERFORMED), NP SWAB IN TRANSPORT MEDIA - Swab, Nasopharynx [[id number]]  (Abnormal) Collected:  04/29/20 1644    Lab Status:  Final result Specimen:  Swab from Nasopharynx Updated:  04/30/20 0003     COVID19 Detected    Respiratory Panel, PCR - Swab, Nasopharynx [[id number]]  (Normal) Collected:  04/29/20 1644    Lab Status:  Final result Specimen:  Swab from Nasopharynx Updated:  04/29/20 1802     ADENOVIRUS, PCR Not Detected     Coronavirus 229E Not Detected     Coronavirus HKU1 Not Detected     Coronavirus NL63 Not Detected     Coronavirus OC43 Not Detected     Human Metapneumovirus Not Detected     Human Rhinovirus/Enterovirus Not Detected     Influenza B PCR Not Detected     Parainfluenza Virus 1 Not Detected     Parainfluenza Virus 2 Not Detected     Parainfluenza Virus 3 Not Detected     Parainfluenza Virus 4 Not Detected     Bordetella pertussis pcr Not Detected     Influenza A H1 2009 PCR Not Detected     Chlamydophila pneumoniae PCR Not Detected     Mycoplasma pneumo by PCR Not Detected     Influenza A PCR Not Detected     Influenza A H3 Not Detected     Influenza A H1 Not Detected     RSV, PCR Not Detected     Bordetella parapertussis PCR Not Detected    Narrative:       The coronavirus on the RVP is NOT COVID-19 and is NOT indicative of infection with COVID-19.           Imaging Results (Last 24 Hours)     ** No results found for the last 24 hours. **          Results for orders placed during the hospital encounter of 04/29/20   Adult Transthoracic Echo Complete W/ Cont if Necessary Per Protocol    Narrative · Calculated EF = 59.0%.  · Left ventricular wall thickness is consistent with mild concentric   hypertrophy.  · Left ventricular systolic function is normal.  · Left ventricular diastolic dysfunction (grade I) consistent with   impaired relaxation.  · No significant structural  or functional valvular disease.          I have reviewed the medications:  Scheduled Meds:    atenolol 25 mg Oral Daily   dilTIAZem  mg Oral Daily   enoxaparin 1 mg/kg Subcutaneous Q12H   furosemide 20 mg Oral Daily   pantoprazole 40 mg Oral Q AM   pravastatin 40 mg Oral Nightly   sodium chloride 10 mL Intravenous Q12H     Continuous Infusions:   PRN Meds:.•  acetaminophen  •  albuterol sulfate HFA  •  calcium carbonate EX  •  diphenhydrAMINE  •  HYDROcodone-acetaminophen  •  magnesium sulfate **OR** magnesium sulfate in D5W 1g/100mL (PREMIX) **OR** magnesium sulfate  •  potassium chloride  •  sodium chloride  •  sodium chloride  •  sodium chloride    Assessment/Plan   Assessment & Plan     Active Hospital Problems    Diagnosis  POA   • **COVID-19 Pneumonitis [U07.1, J12.89]  Yes   • Acute hypoxemic respiratory failure (CMS/HCC) [J96.01]  Yes   • Fever [R50.9]  Yes   • Paroxysmal atrial fibrillation (CMS/HCC) [I48.0]  Yes   • Obesity (BMI 30-39.9) [E66.9]  Yes      Resolved Hospital Problems   No resolved problems to display.        Brief Hospital Course to date:  Marlene Aguirre is a 64 y.o. female with hx of Afib on Xarelto, pacemaker, asthma, and obesity who presents to the ER on 4/29/20 due to progressive cough, dyspnea, and fever despite being placed on Zithromax as an outpatient. She was hypoxic and febrile on arrival. CXR showed patchy airspace disease in the right mid and lower lung, and D-dimer was mildly elevated. V/Q scan was low probability for PE. Subsequent CT chest without contrast showed extensive multifocal regions of moderately dense, patchy ground glass opacities throughout both lungs, typical for COVID-19. Her COVID-19 PCR returned POSITIVE. In the hours following hospital admission, patient had worsening O2 requirements (up to 6 liters) and was transferred to the ICU. She received a dose of Actemra to prevent cytokine storm as well as convalescent plasma per Infectious Disease  recommendations. Patient required up to 80% high flow nasal cannula but improved down to 3 liters and was transferred to the floor with hospitalist service resuming care on 5/7. In the early morning hours on 5/8, she had chest pressure which resolved after nitroglycerin as well as gas pains and belching which resolved with Tums. EKG and troponins were negative and Echo was unremarkable aside from diastolic dysfunction.    Of note, patient's sister is currently hospitalized with COVID-19 as well. Patient reports her son as well as granddaughter also have tested positive. It seems there was a physical therapist who had come to work with patient's son during home PT who had tested positive, so likely the initial source of transmission.    COVID-19 pneumonitis  Acute hypoxic respiratory failure  --s/p Actemra (4/29) and convalescent plasma (4/30).  --ID following. Inflammatory markers trending down.  --Now on 2 liters. Continue to wean as tolerated. Encouraged IS.  --Mobilize with PT/OT.  --Add Mucinex today.    Transient atypical chest pain 5/8/20  --EKG and troponins unremarkable. Echo 5/8 personally reviewed and unremarkable aside from diastolic dysfunction.   --Now chest pain free, likely GI related (improved with Tums). Continue Protonix and Tums.    Hx of PAF  Hx of pacemaker  --Previously on Xarelto, will resume at discharge.  --Continue Diltiazem and Atenolol.    Headache, persistent  --Tylenol not helping much.  --Says she is allergic to Excedrin.    Sciatic nerve pain  --Add PRN Norco.     DVT Prophylaxis:  Lovenox    (My partner Dr. Gill previously spoke with patient's PCP Dr. Ammy Leblanc (cell [phone]) on 5/7/20 with update on patient's condition. She wishes to be called when patient is being discharged home from rehab so she can coordinate close outpatient follow up. Will need to make that clear in discharge instructions/summary.)    Patient declined family phone call again today. No new updates.  Waiting for [facility] approval hopefully tomorrow. If she is able to be weaned completely off oxygen, she may decide to go home instead, as one of her reasons to go to rehab was that she felt uncomfortable with going home on oxygen to her  who has early dementia and is a smoker. Continue PT/OT while we wait.    Disposition: I expect the patient to be discharged to [facility] vs home 1-2 days    CODE STATUS:   Code Status and Medical Interventions:   Ordered at: 04/29/20 1943     Code Status:    CPR     Medical Interventions (Level of Support Prior to Arrest):    Full         Electronically signed by Shannan Etienne MD, 05/10/20, 12:59.

## 2020-05-11 LAB — MAGNESIUM SERPL-MCNC: 2.1 MG/DL (ref 1.6–2.4)

## 2020-05-11 PROCEDURE — 63710000001 DIPHENHYDRAMINE PER 50 MG: Performed by: INTERNAL MEDICINE

## 2020-05-11 PROCEDURE — U0003 INFECTIOUS AGENT DETECTION BY NUCLEIC ACID (DNA OR RNA); SEVERE ACUTE RESPIRATORY SYNDROME CORONAVIRUS 2 (SARS-COV-2) (CORONAVIRUS DISEASE [COVID-19]), AMPLIFIED PROBE TECHNIQUE, MAKING USE OF HIGH THROUGHPUT TECHNOLOGIES AS DESCRIBED BY CMS-2020-01-R: HCPCS | Performed by: HOSPITALIST

## 2020-05-11 PROCEDURE — 25010000002 ENOXAPARIN PER 10 MG: Performed by: INTERNAL MEDICINE

## 2020-05-11 PROCEDURE — 83735 ASSAY OF MAGNESIUM: CPT | Performed by: HOSPITALIST

## 2020-05-11 PROCEDURE — 99232 SBSQ HOSP IP/OBS MODERATE 35: CPT | Performed by: HOSPITALIST

## 2020-05-11 PROCEDURE — 97110 THERAPEUTIC EXERCISES: CPT

## 2020-05-11 PROCEDURE — 97116 GAIT TRAINING THERAPY: CPT

## 2020-05-11 PROCEDURE — U0002 COVID-19 LAB TEST NON-CDC: HCPCS | Performed by: HOSPITALIST

## 2020-05-11 RX ADMIN — HYDROCODONE BITARTRATE AND ACETAMINOPHEN 1 TABLET: 5; 325 TABLET ORAL at 06:01

## 2020-05-11 RX ADMIN — PRAVASTATIN SODIUM 40 MG: 40 TABLET ORAL at 20:17

## 2020-05-11 RX ADMIN — ENOXAPARIN SODIUM 120 MG: 120 INJECTION SUBCUTANEOUS at 20:17

## 2020-05-11 RX ADMIN — SODIUM CHLORIDE, PRESERVATIVE FREE 10 ML: 5 INJECTION INTRAVENOUS at 08:03

## 2020-05-11 RX ADMIN — GUAIFENESIN 600 MG: 600 TABLET, EXTENDED RELEASE ORAL at 07:57

## 2020-05-11 RX ADMIN — ENOXAPARIN SODIUM 120 MG: 120 INJECTION SUBCUTANEOUS at 07:58

## 2020-05-11 RX ADMIN — CAMPHOR AND MENTHOL: 5; 5 LOTION TOPICAL at 14:05

## 2020-05-11 RX ADMIN — GUAIFENESIN 600 MG: 600 TABLET, EXTENDED RELEASE ORAL at 20:17

## 2020-05-11 RX ADMIN — DILTIAZEM HYDROCHLORIDE 240 MG: 240 CAPSULE, COATED, EXTENDED RELEASE ORAL at 07:57

## 2020-05-11 RX ADMIN — ATENOLOL 25 MG: 25 TABLET ORAL at 07:58

## 2020-05-11 RX ADMIN — SODIUM CHLORIDE, PRESERVATIVE FREE 10 ML: 5 INJECTION INTRAVENOUS at 20:19

## 2020-05-11 RX ADMIN — FUROSEMIDE 20 MG: 20 TABLET ORAL at 07:57

## 2020-05-11 RX ADMIN — HYDROCODONE BITARTRATE AND ACETAMINOPHEN 1 TABLET: 5; 325 TABLET ORAL at 16:14

## 2020-05-11 RX ADMIN — DIPHENHYDRAMINE HYDROCHLORIDE 50 MG: 50 CAPSULE ORAL at 14:05

## 2020-05-11 RX ADMIN — PANTOPRAZOLE SODIUM 40 MG: 40 TABLET, DELAYED RELEASE ORAL at 06:01

## 2020-05-11 NOTE — THERAPY TREATMENT NOTE
Patient Name: Marlene Aguirre  : 1955    MRN: [id number]                              Today's Date: 2020       Admit Date: 2020    Visit Dx:     ICD-10-CM ICD-9-CM   1. Acute hypoxemic respiratory failure (CMS/HCC) J96.01 518.81   2. COVID-19 virus infection U07.1      Patient Active Problem List   Diagnosis   • Acute hypoxemic respiratory failure (CMS/HCC)   • COVID-19 Pneumonitis   • Fever   • Paroxysmal atrial fibrillation (CMS/HCC)   • Chronic anticoagulation   • History of pacemaker   • Multiple allergies (ct scan iv contrast, pcn, keflex, quinalones, tetracyclines, kenalog)   • Obesity (BMI 30-39.9)   • Asthma     Past Medical History:   Diagnosis Date   • Asthma    • Atrial fibrillation (CMS/HCC)    • Coronary artery disease    • Hypertension      Past Surgical History:   Procedure Laterality Date   • BILATERAL BREAST REDUCTION     • BRAIN SURGERY     • DILATATION AND CURETTAGE     • PACEMAKER IMPLANTATION       General Information     Row Name 20 1020          PT Evaluation Time/Intention    Document Type  therapy note (daily note)  -VG     Mode of Treatment  individual therapy;physical therapy  -VG     Row Name 20 1020          General Information    Existing Precautions/Restrictions  fall;oxygen therapy device and L/min  -VG     Row Name 20 1020          Cognitive Assessment/Intervention- PT/OT    Orientation Status (Cognition)  oriented x 4  -VG       User Key  (r) = Recorded By, (t) = Taken By, (c) = Cosigned By    Initials Name Provider Type    VG Elizabeth Barton, PT Physical Therapist        Mobility     Row Name 20 1020          Bed Mobility Assessment/Treatment    Supine-Sit [place] (Bed Mobility)  supervision;verbal cues  -VG     Assistive Device (Bed Mobility)  bed rails;head of bed elevated  -VG     Comment (Bed Mobility)  Cues for hand placement and sequencing.  -VG     Row Name 20 1020          Transfer Assessment/Treatment    Comment  (Transfers)  Cues for hand placement and sequencing.  -VG     Row Name 05/11/20 1020          Sit-Stand Transfer    Sit-Stand [place] (Transfers)  contact guard;verbal cues  -VG     Assistive Device (Sit-Stand Transfers)  walker, front-wheeled  -VG     Row Name 05/11/20 1020          Gait/Stairs Assessment/Training    [place] Level (Gait)  contact guard;verbal cues  -VG     Assistive Device (Gait)  walker, front-wheeled  -VG     Distance in Feet (Gait)  60  -VG     Deviations/Abnormal Patterns (Gait)  nella decreased;festinating/shuffling;gait speed decreased  -VG     Bilateral Gait Deviations  forward flexed posture  -VG     Comment (Gait/Stairs)  Distance limited by fatigue, weakness, mild SOA, and LLE sciatic pain. VSS on 2LO2NC. Improved endurance and stability with gait.  -VG       User Key  (r) = Recorded By, (t) = Taken By, (c) = Cosigned By    Initials Name Provider Type    VG Elizabeth Barton, PT Physical Therapist        Obj/Interventions    No documentation.       Goals/Plan    No documentation.       Clinical Impression     Row Name 05/11/20 1021          Pain Scale: Numbers Pre/Post-Treatment    Pain Scale: Numbers, Pretreatment  4/10  -VG     Pain Scale: Numbers, Post-Treatment  4/10  -VG     Pain Location - Side  Left  -VG     Pain Location - Orientation  lower  -VG     Pain Location  extremity  -VG     Pain Intervention(s)  Ambulation/increased activity;Repositioned  -VG     Row Name 05/11/20 1021          Plan of Care Review    Plan of Care Reviewed With  patient  -VG     Progress  improving  -VG     Outcome Summary  Pt increased ambulation distance to 60 ft with RW and CGA-SBA. Distance limited by fatigue, weakness, mild SOA, and LLE sciatic pain. Improved endurance and stability with gait. Good progress, good rehab candidate. Will continue to progress pt as able per POC.  -VG     Row Name 05/11/20 1021          Vital Signs    Pre SpO2 (%)  95  -VG     O2 Delivery Pre Treatment   supplemental O2  -VG     Post SpO2 (%)  95  -VG     O2 Delivery Post Treatment  supplemental O2  -VG     Pre Patient Position  Supine  -VG     Post Patient Position  Sitting  -VG     Row Name 05/11/20 1021          Positioning and Restraints    Pre-Treatment Position  in bed  -VG     Post Treatment Position  bsc  -VG     On BS commode  sitting;call light within reach;encouraged to call for assist  -VG       User Key  (r) = Recorded By, (t) = Taken By, (c) = Cosigned By    Initials Name Provider Type    VG Elizabeth Barton, PT Physical Therapist        Outcome Measures     Row Name 05/11/20 1025          How much help from another person do you currently need...    Turning from your back to your side while in flat bed without using bedrails?  4  -VG     Moving from lying on back to sitting on the side of a flat bed without bedrails?  4  -VG     Moving to and from a bed to a chair (including a wheelchair)?  3  -VG     Standing up from a chair using your arms (e.g., wheelchair, bedside chair)?  3  -VG     Climbing 3-5 steps with a railing?  2  -VG     To walk in hospital room?  3  -VG     AM-PAC 6 Clicks Score (PT)  19  -VG     Row Name 05/11/20 1025          Functional Assessment    Outcome Measure Options  AM-PAC 6 Clicks Basic Mobility (PT)  -VG       User Key  (r) = Recorded By, (t) = Taken By, (c) = Cosigned By    Initials Name Provider Type    VG Elizabeth Barton, PT Physical Therapist        Physical Therapy Education                 Title: PT OT SLP Therapies (Done)     Topic: Physical Therapy (Done)     Point: Mobility training (Done)     Description:   Instruct learner(s) on safety and technique for assisting patient out of bed, chair or wheelchair.  Instruct in the proper use of assistive devices, such as walker, crutches, cane or brace.              Patient Friendly Description:   It's important to get you on your feet again, but we need to do so in a way that is safe for you. Falling has serious  consequences, and your personal safety is the most important thing of all.        When it's time to get out of bed, one of us or a family member will sit next to you on the bed to give you support.     If your doctor or nurse tells you to use a walker, crutches, a cane, or a brace, be sure you use it every time you get out of bed, even if you think you don't need it.    Learning Progress Summary           Patient Acceptance, E, VU by VG at 5/11/2020 1025    Acceptance, E, VU by HH at 5/10/2020 0031    Acceptance, E, NR by KR at 5/8/2020 1045    Acceptance, E, VU by VG at 5/7/2020 1027    Acceptance, E,D, VU,NR by LS at 5/6/2020 1631                   Point: Home exercise program (Done)     Description:   Instruct learner(s) on appropriate technique for monitoring, assisting and/or progressing patient with therapeutic exercises and activities.              Learning Progress Summary           Patient Acceptance, E, VU by VG at 5/11/2020 1025    Acceptance, E, VU by HH at 5/10/2020 0031    Acceptance, E, NR by KR at 5/8/2020 1045    Acceptance, E, VU by VG at 5/7/2020 1027    Acceptance, E,D, VU,NR by LS at 5/6/2020 1631                   Point: Body mechanics (Done)     Description:   Instruct learner(s) on proper positioning and spine alignment for patient and/or caregiver during mobility tasks and/or exercises.              Learning Progress Summary           Patient Acceptance, E, VU by VG at 5/11/2020 1025    Acceptance, E, VU by HH at 5/10/2020 0031    Acceptance, E, NR by KR at 5/8/2020 1045    Acceptance, E, VU by VG at 5/7/2020 1027    Acceptance, E,D, VU,NR by LS at 5/6/2020 1631                   Point: Precautions (Done)     Description:   Instruct learner(s) on prescribed precautions during mobility and gait tasks              Learning Progress Summary           Patient Acceptance, E, VU by VG at 5/11/2020 1025    Acceptance, E, VU by HH at 5/10/2020 0031    Acceptance, E, NR by KR at 5/8/2020 1045     Acceptance, E, VU by  at 5/7/2020 1027    Acceptance, E,D, VU,NR by  at 5/6/2020 1631                               User Key     Initials Effective Dates Name Provider Type Discipline     06/19/15 -  Monica Bernal, PT Physical Therapist PT     02/15/19 -  Meena Hutson RN Registered Nurse Nurse    KR 04/03/18 -  Susie Godwin, PT Physical Therapist PT     05/29/18 -  Elizabeth Barton, PT Physical Therapist PT              PT Recommendation and Plan     Outcome Summary/Treatment Plan (PT)  Anticipated Discharge Disposition (PT): inpatient rehabilitation facility  Plan of Care Reviewed With: patient  Progress: improving  Outcome Summary: Pt increased ambulation distance to 60 ft with RW and CGA-SBA. Distance limited by fatigue, weakness, mild SOA, and LLE sciatic pain. Improved endurance and stability with gait. Good progress, good rehab candidate. Will continue to progress pt as able per POC.     Time Calculation:   PT Charges     Row Name 05/11/20 0949             Time Calculation    Start Time  0949  -      PT Received On  05/11/20  -      PT Goal Re-Cert Due Date  05/16/20  -         Time Calculation- PT    Total Timed Code Minutes- PT  30 minute(s)  -         Timed Charges    84757 - Gait Training Minutes   30  -VG        User Key  (r) = Recorded By, (t) = Taken By, (c) = Cosigned By    Initials Name Provider Type     Elizabeth Barton, PT Physical Therapist        Therapy Charges for Today     Code Description Service Date Service Provider Modifiers Qty    [id number] HC GAIT TRAINING EA 15 MIN 5/11/2020 Elizabeth Barton, PT GP 2          PT G-Codes  Outcome Measure Options: AM-PAC 6 Clicks Basic Mobility (PT)  AM-PAC 6 Clicks Score (PT): 19  AM-PAC 6 Clicks Score (OT): 18    Vidhi Barton PT  5/11/2020

## 2020-05-11 NOTE — PROGRESS NOTES
Continued Stay Note  [facility]     Patient Name: Marlene Aguirre  MRN: [id number]  Today's Date: 5/11/2020    Admit Date: 4/29/2020    Discharge Plan     Row Name 05/11/20 1134       Plan    Plan  [facility]     Patient/Family in Agreement with Plan  yes    Plan Comments  Notified Jen that patient is medically ready according to MD during morning rounds - she will notify me later today regarding referral/acceptace/bed status - CM following- dianelys [phone]        Discharge Codes    No documentation.       Expected Discharge Date and Time     Expected Discharge Date Expected Discharge Time    May 11, 2020             Dianelys Sharp RN

## 2020-05-11 NOTE — PROGRESS NOTES
Clinical Nutrition     Reason for Visit: Follow-up protocol    Patient Name: Marlene Aguirre  YOB: 1955  MRN: [id number]  Date of Encounter: 05/11/20 14:07  Admission date: 4/29/2020    Nutrition Assessment     Admission Problem List:  COVID-19 Pneumonitis   Acute hypoxemic respiratory failure   PNA  s/p convalescent plasma therapy 4-30-20    Applicable PMH/PSxh:  Asthma  Atrial fibrillation (CMS/HCC)  Coronary artery disease  Hypertension.  Pacemaker Implantation  Brain surgery      Reported/Observed/Food/Nutrition Related History:       Anthropometrics     Height: 63in  Weight: 244lb bedscale  BMI: 43kg/m2   BMI classification: Obese Class III extreme obesity: > or equal to 40kg/m2     Labs reviewed       Medications reviewed       Current Nutrition Prescription     PO: Diet Regular; Cardiac  ONS: Boost breeze (BID)     Intake : 79% x 6 meals    Nutrition Diagnosis     5-4-20, revised 5/11  Problem Inadequate oral intake   Etiology Per Clinical Status:   Signs/Symptoms Po intake 63%   Status: resolved/improved per intake documentation     Nutrition Intervention     1.  Follow treatment progress, care plan reviewed  2. Will leave ONS order in as is for now     Goal:   General: Nutrition support treatment  PO: Maintain intake, Meet estimated needs      Monitoring/Evaluation:   Per protocol, PO intake, Supplement intake    Shayla Pinto RDN, LD, CNSC  Time Spent: 25min

## 2020-05-11 NOTE — PROGRESS NOTES
[facility] Medicine Services  PROGRESS NOTE    Patient Name: Marlene Aguirre  : 1955  MRN: [id number]    Date of Admission: 2020  Primary Care Physician: Provider, No Known    Subjective   Subjective     CC:  F/U COVID-19 infection    HPI:  Patient seen this morning. Complains of some sciatic nerve pain, asking for some Biofreeze. Otherwise feels well.    Patient interviewed via telephone with direct visualization of patient through the window.    Review of Systems  Gen-no fevers, no chills  CV-no chest pain, no palpitations  Resp-+mild cough, no dyspnea  GI-no N/V/D, no abd pain    All other systems reviewed and negative except any additional pertinent positives and negatives as discussed in HPI.      Objective   Objective     Vital Signs:   Temp:  [97.8 °F (36.6 °C)-98.8 °F (37.1 °C)] 97.9 °F (36.6 °C)  Heart Rate:  [64-88] 77  Resp:  [18] 18  BP: (107-151)/(66-86) 151/66        Physical Exam:  With patient's consent, physical exam was conducted via visual telemedicine encounter with bedside portion accommodated by nursing staff due to patient's current isolation requirements in the interest of PPE conservation.    Constitutional: No acute distress, awake, alert, nontoxic, normal body habitus, sitting up in chair beside bed  Respiratory: good effort, nonlabored respirations on 2 L  Cardiovascular:  tele with NSR  Musculoskeletal: No edema, normal muscle tone and mass for age  GI: Soft, no grimace, nondistended  Psychiatric: Appropriate affect, good insight and judgement, cooperative  Neurologic: Oriented x 3, movements symmetric BUE and BLE, speech clear and fluent      Results Reviewed:  Results from last 7 days   Lab Units 20  0424 20  0556   WBC 10*3/mm3 6.21 6.24   HEMOGLOBIN g/dL 12.9 13.9   HEMATOCRIT % 39.6 41.1   PLATELETS 10*3/mm3 311 283     Results from last 7 days   Lab Units 20  0542 20  1043 20  0641 20  0556   SODIUM mmol/L  139  --  140 135*   POTASSIUM mmol/L 4.3  --  4.3 4.2   CHLORIDE mmol/L 99  --  100 98   CO2 mmol/L 28.0  --  27.0 26.0   BUN mg/dL 15  --  15 16   CREATININE mg/dL 0.63  --  0.65 0.62   GLUCOSE mg/dL 101*  --  116* 97   CALCIUM mg/dL 8.6  --  8.4* 8.7   ALT (SGPT) U/L  --   --  16  --    AST (SGOT) U/L  --   --  23  --    TROPONIN T ng/mL  --  <0.010 <0.010  --    PROBNP pg/mL  --   --  84.0  --      Estimated Creatinine Clearance: 108 mL/min (by C-G formula based on SCr of 0.63 mg/dL).    Microbiology Results Abnormal     Procedure Component Value - Date/Time    CORONAVIRUS (COVID-19),RT-PCR,[facility], NP SWAB IN [facility] MEDIA - Swab, Nasopharynx [[id number]]  (Abnormal) Collected:  05/07/20 1425    Lab Status:  Final result Specimen:  Swab from Nasopharynx Updated:  05/08/20 1636     Reference Lab Report --     Comment: See scanned report          COVID19 Detected    Blood Culture - Blood, Arm, Right [[id number]] Collected:  04/29/20 1600    Lab Status:  Final result Specimen:  Blood from Arm, Right Updated:  05/04/20 1615     Blood Culture No growth at 5 days    Blood Culture - Blood, Arm, Right [[id number]] Collected:  04/29/20 1550    Lab Status:  Final result Specimen:  Blood from Arm, Right Updated:  05/04/20 1615     Blood Culture No growth at 5 days    Legionella Antigen, Urine - Urine, Urine, Clean Catch [[id number]]  (Normal) Collected:  04/30/20 0911    Lab Status:  Final result Specimen:  Urine, Clean Catch Updated:  04/30/20 1302     LEGIONELLA ANTIGEN, URINE Negative    S. Pneumo Ag Urine or CSF - Urine, Urine, Clean Catch [[id number]]  (Normal) Collected:  04/30/20 0911    Lab Status:  Final result Specimen:  Urine, Clean Catch Updated:  04/30/20 1302     Strep Pneumo Ag Negative    MRSA Screen, PCR (Inpatient) - Swab, Nares [[id number]]  (Normal) Collected:  04/29/20 2157    Lab Status:  Final result Specimen:  Swab from Nares Updated:  04/30/20 0037     MRSA PCR Negative    Narrative:       MRSA  Negative    SARS-CoV-2 PCR ([place] IN-HOUSE PERFORMED), NP SWAB IN TRANSPORT MEDIA - Swab, Nasopharynx [[id number]]  (Abnormal) Collected:  04/29/20 1644    Lab Status:  Final result Specimen:  Swab from Nasopharynx Updated:  04/30/20 0003     COVID19 Detected    Respiratory Panel, PCR - Swab, Nasopharynx [[id number]]  (Normal) Collected:  04/29/20 1644    Lab Status:  Final result Specimen:  Swab from Nasopharynx Updated:  04/29/20 1802     ADENOVIRUS, PCR Not Detected     Coronavirus 229E Not Detected     Coronavirus HKU1 Not Detected     Coronavirus NL63 Not Detected     Coronavirus OC43 Not Detected     Human Metapneumovirus Not Detected     Human Rhinovirus/Enterovirus Not Detected     Influenza B PCR Not Detected     Parainfluenza Virus 1 Not Detected     Parainfluenza Virus 2 Not Detected     Parainfluenza Virus 3 Not Detected     Parainfluenza Virus 4 Not Detected     Bordetella pertussis pcr Not Detected     Influenza A H1 2009 PCR Not Detected     Chlamydophila pneumoniae PCR Not Detected     Mycoplasma pneumo by PCR Not Detected     Influenza A PCR Not Detected     Influenza A H3 Not Detected     Influenza A H1 Not Detected     RSV, PCR Not Detected     Bordetella parapertussis PCR Not Detected    Narrative:       The coronavirus on the RVP is NOT COVID-19 and is NOT indicative of infection with COVID-19.           Imaging Results (Last 24 Hours)     ** No results found for the last 24 hours. **          Results for orders placed during the hospital encounter of 04/29/20   Adult Transthoracic Echo Complete W/ Cont if Necessary Per Protocol    Narrative · Calculated EF = 59.0%.  · Left ventricular wall thickness is consistent with mild concentric   hypertrophy.  · Left ventricular systolic function is normal.  · Left ventricular diastolic dysfunction (grade I) consistent with   impaired relaxation.  · No significant structural or functional valvular disease.          I have reviewed the  medications:  Scheduled Meds:    atenolol 25 mg Oral Daily   dilTIAZem  mg Oral Daily   enoxaparin 1 mg/kg Subcutaneous Q12H   furosemide 20 mg Oral Daily   guaiFENesin 600 mg Oral Q12H   pantoprazole 40 mg Oral Q AM   pravastatin 40 mg Oral Nightly   sodium chloride 10 mL Intravenous Q12H     Continuous Infusions:   PRN Meds:.•  acetaminophen  •  albuterol sulfate HFA  •  calcium carbonate EX  •  camphor-menthol  •  diphenhydrAMINE  •  HYDROcodone-acetaminophen  •  magnesium sulfate **OR** magnesium sulfate in D5W 1g/100mL (PREMIX) **OR** magnesium sulfate  •  potassium chloride  •  sodium chloride  •  sodium chloride  •  sodium chloride    Assessment/Plan   Assessment & Plan     Active Hospital Problems    Diagnosis  POA   • **COVID-19 Pneumonitis [U07.1, J12.89]  Yes   • Acute hypoxemic respiratory failure (CMS/HCC) [J96.01]  Yes   • Fever [R50.9]  Yes   • Paroxysmal atrial fibrillation (CMS/HCC) [I48.0]  Yes   • Obesity (BMI 30-39.9) [E66.9]  Yes      Resolved Hospital Problems   No resolved problems to display.        Brief Hospital Course to date:  Marlene Aguirre is a 64 y.o. female with hx of Afib on Xarelto, pacemaker, asthma, and obesity who presents to the ER on 4/29/20 due to progressive cough, dyspnea, and fever despite being placed on Zithromax as an outpatient. She was hypoxic and febrile on arrival. CXR showed patchy airspace disease in the right mid and lower lung, and D-dimer was mildly elevated. V/Q scan was low probability for PE. Subsequent CT chest without contrast showed extensive multifocal regions of moderately dense, patchy ground glass opacities throughout both lungs, typical for COVID-19. Her COVID-19 PCR returned POSITIVE. In the hours following hospital admission, patient had worsening O2 requirements (up to 6 liters) and was transferred to the ICU. She received a dose of Actemra to prevent cytokine storm as well as convalescent plasma per Infectious Disease recommendations.  Patient required up to 80% high flow nasal cannula but improved down to 3 liters and was transferred to the floor with hospitalist service resuming care on 5/7. In the early morning hours on 5/8, she had chest pressure which resolved after nitroglycerin as well as gas pains and belching which resolved with Tums. EKG and troponins were negative and Echo was unremarkable aside from diastolic dysfunction.    Of note, patient's sister is currently hospitalized with COVID-19 as well. Patient reports her son as well as granddaughter also have tested positive. It seems there was a physical therapist who had come to work with patient's son during home PT who had tested positive, so likely the initial source of transmission.    COVID-19 pneumonitis  Acute hypoxic respiratory failure  --s/p Actemra (4/29) and convalescent plasma (4/30).  --ID following. Inflammatory markers trending down.  --Now on 2 liters. Unable to wean off completely so will need oxygen at discharge. Encouraged IS.  --Mobilize with PT/OT.    Transient atypical chest pain 5/8/20  --EKG and troponins unremarkable. Echo 5/8 unremarkable aside from diastolic dysfunction.   --Now chest pain free, likely GI related (improved with Tums). Continue Protonix and Tums.    Hx of PAF  Hx of pacemaker  --Previously on Xarelto, will resume at discharge.  --Continue Diltiazem and Atenolol.    Headache, persistent  --Tylenol not helping much.  --Says she is allergic to Excedrin.  --Improved today    Sciatic nerve pain  --Continue PRN Norco.   --Add Biofreeze.    DVT Prophylaxis:  Lovenox    (My partner Dr. Gill previously spoke with patient's PCP Dr. Ammy Leblanc (cell [phone]) on 5/7/20 with update on patient's condition. She wishes to be called when patient is being discharged home from rehab so she can coordinate close outpatient follow up. Will need to make that clear in discharge instructions/summary.)    Patient declined family phone call today. No new  updates. Waiting for [facility] approval.     Disposition: I expect the patient to be discharged to [facility] vs home anytime    CODE STATUS:   Code Status and Medical Interventions:   Ordered at: 04/29/20 1943     Code Status:    CPR     Medical Interventions (Level of Support Prior to Arrest):    Full         Electronically signed by Shannan Etienne MD, 05/11/20, 11:49.

## 2020-05-11 NOTE — PROGRESS NOTES
INFECTIOUS DISEASE CONSULT/INITIAL HOSPITAL VISIT    Marlene Aguirre  1955  [id number]    Date of Consult: 5/11/2020    Admission Date: 4/29/2020      Requesting Provider: Rishi Gill MD  Evaluating Physician: Braxton Horn MD    Reason for Consultation: COVID-19    History of present illness:    Patient is a 64 y.o. female who has intermittent asthma who has history of tobacco use and quit 3 years ago has multiple family members living with her.  Apparently patient developed headache, diarrhea, dry cough was given a Z-Jules by primary care physician on April 27,020 patient had progressive cough and fever and hypoxia on April 29.    Apparently a therapist that was coming to the house tested positive for COVID prior to patient's symptoms.    Patient admitted to the floor but because of worsening hypoxia and fevers was transferred to the intensive care unit.  Patient given 1 dose of 800 mg of Actemra last night    Patient is stable hemodynamically and on oxygen by nasal cannula currently her sats on 6 L are 91%    CT scan performed high probability for COVID-19 with bilateral groundglass opacities    COVID PCR positive    5/1/2020 patient received 2 units of convalescent serum plasma yesterday is received IV Lasix as well remains on 6 L with O2 sats between 88% and 90%.  Patient is a good spirits is sitting in chair is able to order food for nutrition per telephone call.  Per nursing staff had liquid bowel movements x3 yesterday is otherwise afebrile and hemodynamically stable    Review of systems remark for diarrhea no fevers    5/2/2020; patient required 8 L of oxygen by nasal cannula last night is otherwise hemodynamically stable.  Patient is eating small amounts reports that she ate for sherbet yesterday with the food otherwise states like cardboard.    Patient has intermittent cough which is productive.  She denies fevers rash sore throat ;    Patient was talked to by telephone and inspected  through the window.    5/3/2020; patient is doing fair he is on high flow nasal cannula now with better O2 sats but is on increasing amounts of oxygen.  Patient is afebrile and hemodynamically stable patient is being diuresed by pulmonary.  Does not report rash or sore throat patient spoke to over the telephone and visualized through the window    5/4/20; doing well; reports nose is stopped up. no events overnight; afebrile, hemodynamically stable; on high flow nasal cannula this is being weaned; denies chest pain, but does have intermittent coughing. Eating better.   Patient inspected from window and talked to by telephone    5/5/2020 patient is on less oxygen has been transitioned from high flow nasal cannula to regular nasal cannula on 6 L currently patient is resting quietly; reports congestion, abdominal cramping, diarrhea.   5/6/2020 patient is on 3 L oxygen is afebrile and hemodynamically stable to be transferred to floor good spirits no complaints today    5/7/20; doing well; no events overnight; no rash, sore throat, diarrhea; transferred to floor; requiring 3 liters o2; glad to be out of ICU; breathing is ok; still has cough, feels a little heaviness in chest, and dyspnea; feels better overall;     5/8/2020 patient is feeling fair did have some chest pain with radiation to the left scapula last night cardiology is being involved cardiac markers been obtained been negative patient denies shortness of breath denies fevers is not well rested today no other complaints    5/11/2020 patient is doing well patient is requiring 2 L of oxygen denies fevers, rash, sore throat  Has no major complaints today is being assessed for transfer to [facility].  Past Medical History:   Diagnosis Date   • Asthma    • Atrial fibrillation (CMS/HCC)    • Coronary artery disease    • Hypertension        Past Surgical History:   Procedure Laterality Date   • BILATERAL BREAST REDUCTION     • BRAIN SURGERY     • DILATATION AND  CURETTAGE     • PACEMAKER IMPLANTATION         History reviewed. No pertinent family history.    Social History     Socioeconomic History   • Marital status:      Spouse name: Not on file   • Number of children: Not on file   • Years of education: Not on file   • Highest education level: Not on file   Tobacco Use   • Smoking status: Former Smoker   • Smokeless tobacco: Never Used   • Tobacco comment: QUIT 3 YEARS AGO   Substance and Sexual Activity   • Alcohol use: Never     Frequency: Never   • Drug use: Never   • Sexual activity: Defer       Allergies   Allergen Reactions   • Contrast Dye Swelling     FACE, NOSE AND NECK SWELLING AS WELL AS SANDI    • Doxycycline Unknown - Low Severity     PT UNSURE OF REACTION    • Flexeril [Cyclobenzaprine] Hives   • Keflex [Cephalexin] Unknown - Low Severity     PT DOES NOT REMEMBER    • Kenalog [Triamcinolone Acetonide] Itching   • Penicillins Hives   • Sulfa Antibiotics Itching   • Tetracyclines & Related Unknown - Low Severity     PT UNSURE OF REACTION   • Levaquin [Levofloxacin] Rash          Medication:    Current Facility-Administered Medications:   •  acetaminophen (TYLENOL) tablet 650 mg, 650 mg, Oral, Q6H PRN, Case, Conchita V., DO, 650 mg at 05/10/20 1544  •  albuterol sulfate HFA (PROVENTIL HFA;VENTOLIN HFA;PROAIR HFA) inhaler 2 puff, 2 puff, Inhalation, Q4H PRN, Case, Conchita V., DO, 2 puff at 04/30/20 1612  •  atenolol (TENORMIN) tablet 25 mg, 25 mg, Oral, Daily, Case, Conchita V., DO, 25 mg at 05/11/20 0758  •  calcium carbonate EX (TUMS EX) chewable tablet 1,500 mg, 1,500 mg, Oral, TID PRN, Rishi Gill MD, 1,500 mg at 05/08/20 1157  •  camphor-menthol (SARNA) 0.5-0.5 % lotion, , Topical, PRN, Shannan Etienne MD  •  dilTIAZem CD (CARDIZEM CD) 24 hr capsule 240 mg, 240 mg, Oral, Daily, Case, Conchita V., DO, 240 mg at 05/11/20 0757  •  diphenhydrAMINE (BENADRYL) capsule 50 mg, 50 mg, Oral, BID PRN, Case, Conchita V., DO, 50 mg at 05/10/20 1323  •   enoxaparin (LOVENOX) syringe 120 mg, 1 mg/kg, Subcutaneous, Q12H, Case, Conchita V., DO, 120 mg at 05/11/20 0758  •  furosemide (LASIX) tablet 20 mg, 20 mg, Oral, Daily, Rishi Gill MD, 20 mg at 05/11/20 0757  •  guaiFENesin (MUCINEX) 12 hr tablet 600 mg, 600 mg, Oral, Q12H, Shannan Etienne MD, 600 mg at 05/11/20 0757  •  HYDROcodone-acetaminophen (NORCO) 5-325 MG per tablet 1 tablet, 1 tablet, Oral, Q8H PRN, Shannan Etienne MD, 1 tablet at 05/11/20 0601  •  magnesium sulfate 4 gram infusion - Mg less than or equal to 1mg/dL, 4 g, Intravenous, PRN **OR** magnesium sulfate 3 gram infusion (1gm x 3) - Mg 1.1 - 1.5 mg/dL, 1 g, Intravenous, PRN **OR** Magnesium Sulfate 2 gram infusion- Mg 1.6 - 1.9 mg/dL, 2 g, Intravenous, PRN, Case, Conchita V., DO, Last Rate: 25 mL/hr at 05/10/20 0950, 2 g at 05/10/20 0950  •  pantoprazole (PROTONIX) EC tablet 40 mg, 40 mg, Oral, Q AM, Rishi Gill MD, 40 mg at 05/11/20 0601  •  potassium chloride (MICRO-K) CR capsule 40 mEq, 40 mEq, Oral, PRN, Case, Conchita V., DO, 40 mEq at 04/30/20 1221  •  pravastatin (PRAVACHOL) tablet 40 mg, 40 mg, Oral, Nightly, Case, Conchita V., DO, 40 mg at 05/10/20 2001  •  sodium chloride 0.9 % flush 10 mL, 10 mL, Intravenous, PRN, Case, Conchita V., DO  •  sodium chloride 0.9 % flush 10 mL, 10 mL, Intravenous, Q12H, Case, Conchita V., DO, 10 mL at 05/11/20 0803  •  sodium chloride 0.9 % flush 10 mL, 10 mL, Intravenous, PRN, Case, Conchita V., DO, 10 mL at 05/01/20 0001  •  sodium chloride nasal spray 2 spray, 2 spray, Each Nare, PRN, Case, Conchita V., DO, 2 spray at 05/04/20 2016    Antibiotics:  Anti-Infectives (From admission, onward)    Ordered     Dose/Rate Route Frequency Start Stop    04/29/20 1943  vancomycin 2250 mg/500 mL 0.9% NS IVPB ([facility])     Ordering Provider:  Sondra Worrell RPH    20 mg/kg × 118 kg  over 150 Minutes Intravenous Once 04/29/20 2030 04/30/20 0022    04/29/20 1943  aztreonam (AZACTAM) 1 g in 50 mL NS IVPB      Ordering Provider:  Sondra Worrell RPH    1 g  over 30 Minutes Intravenous Once 20 0200            Review of Systems:  See HPI      Physical Exam:   Vital Signs  Temp (24hrs), Av.1 °F (36.7 °C), Min:97.8 °F (36.6 °C), Max:98.8 °F (37.1 °C)    Temp  Min: 97.8 °F (36.6 °C)  Max: 98.8 °F (37.1 °C)  BP  Min: 107/78  Max: 151/66  Pulse  Min: 64  Max: 88  Resp  Min: 18  Max: 18  SpO2  Min: 94 %  Max: 95 %  Patient seen through window and talked to verbally  GENERAL: Resting quietly in bed is on  oxygen by nasal cannula is in no distress  HEENT: Normocephalic, atraumatic.  EOMI no external oral lesions    HEART: Sinus rhythm by telemetry  LUNGS: no respiratory distress  ABDOMEN: Bowel sounds were present.  No obvious abdominal distention on exam        NEURO: Oriented to PPT.  Able to have an appropriate conversation  PSYCHIATRIC:  Normal affect    Laboratory Data    Results from last 7 days   Lab Units 20  0424 20  0556   WBC 10*3/mm3 6.21 6.24   HEMOGLOBIN g/dL 12.9 13.9   HEMATOCRIT % 39.6 41.1   PLATELETS 10*3/mm3 311 283     Results from last 7 days   Lab Units 20  0542   SODIUM mmol/L 139   POTASSIUM mmol/L 4.3   CHLORIDE mmol/L 99   CO2 mmol/L 28.0   BUN mg/dL 15   CREATININE mg/dL 0.63   GLUCOSE mg/dL 101*   CALCIUM mg/dL 8.6     Results from last 7 days   Lab Units 20  0641   ALK PHOS U/L 43   BILIRUBIN mg/dL 0.2   ALT (SGPT) U/L 16   AST (SGOT) U/L 23                         Estimated Creatinine Clearance: 108 mL/min (by C-G formula based on SCr of 0.63 mg/dL).      Microbiology:  No results found for: ACANTHNAEG, AFBCX, BPERTUSSISCX, BLOODCX  No results found for: BCIDPCR, CXREFLEX, CSFCX, CULTURETIS  No results found for: CULTURES, HSVCX, URCX  No results found for: EYECULTURE, GCCX, LABHSV  No results found for: LEGIONELLA, MRSACX, MUMPSCX, MYCOPLASCX  No results found for: NOCARDIACX, STOOLCX  No results found for: THROATCX, UNSTIMCULT, URINECX, CULTURE,  VZVCULTUR  No results found for: VIRALCULTU, WOUNDCX        Radiology:  Imaging Results (Last 72 Hours)     ** No results found for the last 72 hours. **            Impression:   CO VID 19 pneumonitis  Hypoxia  Acute respiratory failure improving  Probable cytokine storm manifested by elevated ferritin elevated LDH, hypoxia  Penicillin allergy; questionable throat swelling  Doxycycline intolerance     Patient has received Actemra on 4/29  Received 2 units of serum convalescent plasma therapy on 4/30    PLAN/RECOMMENDATIONS:   Thank you for asking us to see Marlene Aguirre, I recommend the following:      Of note COVID-19 can have complications including myocarditis and probably pericarditis.    Patient is going to be eval by cardiology because of chest pain.    Given the patient's age and obesity patient is probably at risk for some sort of coronary artery disease with angina  Agree with Lovenox for DVT prophylaxis  Continue pravastatin for cardioprotection        Cytokine storm appears to be resolving    Patient is on oxygen by nasal cannula and improving    Okay from my standpoint to go to [place]    D/w with hospitalist, [facility] office  Dp/cm 15 minutes  Barxton Horn MD  5/11/2020  14:01

## 2020-05-11 NOTE — PAYOR COMM NOTE
"Ref # [id number]  Lucia Laguerre RN, BSN  Phone # [phone]  Fax # [phone]  Marlene Aguirre (64 y.o. Female)     Date of Birth Social Security Number Address Home Phone MRN    1955   [place]   [facility] [address] [phone] [id number]    Yazidi Marital Status          Uatsdin        Admission Date Admission Type Admitting Provider Attending Provider Department, Room/Bed    20 Emergency Shannan Etienne MD Reddy, Mayuri V, MD [facility] 6A, N609/1    Discharge Date Discharge Disposition Discharge Destination                       Attending Provider:  Shannan Etienne MD    Allergies:  Contrast Dye, Doxycycline, Flexeril [Cyclobenzaprine], Keflex [Cephalexin], Kenalog [Triamcinolone Acetonide], Penicillins, Sulfa Antibiotics, Tetracyclines & Related, Levaquin [Levofloxacin]    Isolation:  Contact Air   Infection:  COVID (confirmed) (20)   Code Status:  CPR    Ht:  160 cm (63\")   Wt:  111 kg (244 lb)    Admission Cmt:  None   Principal Problem:  COVID-19 Pneumonitis [U07.1,J12.89]                 Active Insurance as of 2020     Primary Coverage     Payor Plan Insurance Group Employer/Plan Group    [place] [id number]     Payor Plan Address Payor Plan Phone Number Payor Plan Fax Number Effective Dates    [employer] Claims Office - [address] [address] [phone]  2020 - None Entered    [facility] [id number]       Subscriber Name Subscriber Birth Date Member ID       MARLENE AGUIRRE 1955 [id number]                 Operative/Procedure Notes (last 72 hours) (Notes from 20 08 through 20 08)    No notes of this type exist for this encounter.            Physician Progress Notes (last 72 hours) (Notes from 20 08 through 20)      Shannan Etienne MD at 05/10/20 1253              [facility] Medicine Services  PROGRESS NOTE    Patient Name: Marlene Aguirre  : " 1955  MRN: [id number]    Date of Admission: 4/29/2020  Primary Care Physician: Provider, No Known    Subjective   Subjective     CC:  F/U COVID-19 infection    HPI:  Patient seen this morning. Headache a little better. Does complain of some sciatic nerve pain. Says she feels like she has thick phlegm that she can't get up.     Patient interviewed via telephone with direct visualization of patient through the window.    Review of Systems  Gen-no fevers, no chills  CV-no chest pain, no palpitations  Resp-+mild cough, no dyspnea  GI-no N/V/D, no abd pain    All other systems reviewed and negative except any additional pertinent positives and negatives as discussed in HPI.      Objective   Objective     Vital Signs:   Temp:  [96.2 °F (35.7 °C)-97.4 °F (36.3 °C)] 96.4 °F (35.8 °C)  Heart Rate:  [63-77] 77  Resp:  [16-18] 16  BP: (104-128)/(57-78) 125/78        Physical Exam:  With patient's consent, physical exam was conducted via visual telemedicine encounter with bedside portion accommodated by nursing staff due to patient's current isolation requirements in the interest of PPE conservation.    Constitutional: No acute distress, awake, alert, nontoxic, normal body habitus, sitting up in bed  Respiratory: good effort, nonlabored respirations on 2 L  Cardiovascular:  tele with NSR  Musculoskeletal: No edema, normal muscle tone and mass for age  GI: Soft, no grimace, nondistended  Psychiatric: Appropriate affect, good insight and judgement, cooperative  Neurologic: Oriented x 3, movements symmetric BUE and BLE, speech clear and fluent      Results Reviewed:  Results from last 7 days   Lab Units 05/08/20  0424 05/07/20  0556 05/04/20  0408   WBC 10*3/mm3 6.21 6.24 7.40   HEMOGLOBIN g/dL 12.9 13.9 13.4   HEMATOCRIT % 39.6 41.1 41.0   PLATELETS 10*3/mm3 311 283 317     Results from last 7 days   Lab Units 05/09/20  0542 05/08/20  1043 05/08/20  0641 05/07/20  0556   SODIUM mmol/L 139  --  140 135*   POTASSIUM mmol/L 4.3   --  4.3 4.2   CHLORIDE mmol/L 99  --  100 98   CO2 mmol/L 28.0  --  27.0 26.0   BUN mg/dL 15  --  15 16   CREATININE mg/dL 0.63  --  0.65 0.62   GLUCOSE mg/dL 101*  --  116* 97   CALCIUM mg/dL 8.6  --  8.4* 8.7   ALT (SGPT) U/L  --   --  16  --    AST (SGOT) U/L  --   --  23  --    TROPONIN T ng/mL  --  <0.010 <0.010  --    PROBNP pg/mL  --   --  84.0  --      Estimated Creatinine Clearance: 108 mL/min (by C-G formula based on SCr of 0.63 mg/dL).    Microbiology Results Abnormal     Procedure Component Value - Date/Time    CORONAVIRUS (COVID-19),RT-PCR,[email or web address], NP SWAB IN LEXAR SALINE MEDIA - Swab, Nasopharynx [[id number]]  (Abnormal) Collected:  05/07/20 1425    Lab Status:  Final result Specimen:  Swab from Nasopharynx Updated:  05/08/20 1636     Reference Lab Report --     Comment: See scanned report          COVID19 Detected    Blood Culture - Blood, Arm, Right [[id number]] Collected:  04/29/20 1600    Lab Status:  Final result Specimen:  Blood from Arm, Right Updated:  05/04/20 1615     Blood Culture No growth at 5 days    Blood Culture - Blood, Arm, Right [[id number]] Collected:  04/29/20 1550    Lab Status:  Final result Specimen:  Blood from Arm, Right Updated:  05/04/20 1615     Blood Culture No growth at 5 days    Legionella Antigen, Urine - Urine, Urine, Clean Catch [[id number]]  (Normal) Collected:  04/30/20 0911    Lab Status:  Final result Specimen:  Urine, Clean Catch Updated:  04/30/20 1302     LEGIONELLA ANTIGEN, URINE Negative    S. Pneumo Ag Urine or CSF - Urine, Urine, Clean Catch [[id number]]  (Normal) Collected:  04/30/20 0911    Lab Status:  Final result Specimen:  Urine, Clean Catch Updated:  04/30/20 1302     Strep Pneumo Ag Negative    MRSA Screen, PCR (Inpatient) - Swab, Nares [[id number]]  (Normal) Collected:  04/29/20 2157    Lab Status:  Final result Specimen:  Swab from Nares Updated:  04/30/20 0037     MRSA PCR Negative    Narrative:       MRSA Negative    SARS-CoV-2 PCR [facility]  IN-HOUSE PERFORMED), NP SWAB IN TRANSPORT MEDIA - Swab, Nasopharynx [[id number]]  (Abnormal) Collected:  04/29/20 1644    Lab Status:  Final result Specimen:  Swab from Nasopharynx Updated:  04/30/20 0003     COVID19 Detected    Respiratory Panel, PCR - Swab, Nasopharynx [[id number]]  (Normal) Collected:  04/29/20 1644    Lab Status:  Final result Specimen:  Swab from Nasopharynx Updated:  04/29/20 1802     ADENOVIRUS, PCR Not Detected     Coronavirus 229E Not Detected     Coronavirus HKU1 Not Detected     Coronavirus NL63 Not Detected     Coronavirus OC43 Not Detected     Human Metapneumovirus Not Detected     Human Rhinovirus/Enterovirus Not Detected     Influenza B PCR Not Detected     Parainfluenza Virus 1 Not Detected     Parainfluenza Virus 2 Not Detected     Parainfluenza Virus 3 Not Detected     Parainfluenza Virus 4 Not Detected     Bordetella pertussis pcr Not Detected     Influenza A H1 2009 PCR Not Detected     Chlamydophila pneumoniae PCR Not Detected     Mycoplasma pneumo by PCR Not Detected     Influenza A PCR Not Detected     Influenza A H3 Not Detected     Influenza A H1 Not Detected     RSV, PCR Not Detected     Bordetella parapertussis PCR Not Detected    Narrative:       The coronavirus on the RVP is NOT COVID-19 and is NOT indicative of infection with COVID-19.           Imaging Results (Last 24 Hours)     ** No results found for the last 24 hours. **          Results for orders placed during the hospital encounter of 04/29/20   Adult Transthoracic Echo Complete W/ Cont if Necessary Per Protocol    Narrative · Calculated EF = 59.0%.  · Left ventricular wall thickness is consistent with mild concentric   hypertrophy.  · Left ventricular systolic function is normal.  · Left ventricular diastolic dysfunction (grade I) consistent with   impaired relaxation.  · No significant structural or functional valvular disease.          I have reviewed the medications:  Scheduled Meds:    atenolol 25 mg Oral  Daily   dilTIAZem  mg Oral Daily   enoxaparin 1 mg/kg Subcutaneous Q12H   furosemide 20 mg Oral Daily   pantoprazole 40 mg Oral Q AM   pravastatin 40 mg Oral Nightly   sodium chloride 10 mL Intravenous Q12H     Continuous Infusions:   PRN Meds:.•  acetaminophen  •  albuterol sulfate HFA  •  calcium carbonate EX  •  diphenhydrAMINE  •  HYDROcodone-acetaminophen  •  magnesium sulfate **OR** magnesium sulfate in D5W 1g/100mL (PREMIX) **OR** magnesium sulfate  •  potassium chloride  •  sodium chloride  •  sodium chloride  •  sodium chloride    Assessment/Plan   Assessment & Plan     Active Hospital Problems    Diagnosis  POA   • **COVID-19 Pneumonitis [U07.1, J12.89]  Yes   • Acute hypoxemic respiratory failure (CMS/HCC) [J96.01]  Yes   • Fever [R50.9]  Yes   • Paroxysmal atrial fibrillation (CMS/HCC) [I48.0]  Yes   • Obesity (BMI 30-39.9) [E66.9]  Yes      Resolved Hospital Problems   No resolved problems to display.        Brief Hospital Course to date:  Marlene Aguirre is a 64 y.o. female with hx of Afib on Xarelto, pacemaker, asthma, and obesity who presents to the ER on 4/29/20 due to progressive cough, dyspnea, and fever despite being placed on Zithromax as an outpatient. She was hypoxic and febrile on arrival. CXR showed patchy airspace disease in the right mid and lower lung, and D-dimer was mildly elevated. V/Q scan was low probability for PE. Subsequent CT chest without contrast showed extensive multifocal regions of moderately dense, patchy ground glass opacities throughout both lungs, typical for COVID-19. Her COVID-19 PCR returned POSITIVE. In the hours following hospital admission, patient had worsening O2 requirements (up to 6 liters) and was transferred to the ICU. She received a dose of Actemra to prevent cytokine storm as well as convalescent plasma per Infectious Disease recommendations. Patient required up to 80% high flow nasal cannula but improved down to 3 liters and was transferred to the  floor with hospitalist service resuming care on 5/7. In the early morning hours on 5/8, she had chest pressure which resolved after nitroglycerin as well as gas pains and belching which resolved with Tums. EKG and troponins were negative and Echo was unremarkable aside from diastolic dysfunction.    Of note, patient's sister is currently hospitalized with COVID-19 as well. Patient reports her son as well as granddaughter also have tested positive. It seems there was a physical therapist who had come to work with patient's son during home PT who had tested positive, so likely the initial source of transmission.    COVID-19 pneumonitis  Acute hypoxic respiratory failure  --s/p Actemra (4/29) and convalescent plasma (4/30).  --ID following. Inflammatory markers trending down.  --Now on 2 liters. Continue to wean as tolerated. Encouraged IS.  --Mobilize with PT/OT.  --Add Mucinex today.    Transient atypical chest pain 5/8/20  --EKG and troponins unremarkable. Echo 5/8 personally reviewed and unremarkable aside from diastolic dysfunction.   --Now chest pain free, likely GI related (improved with Tums). Continue Protonix and Tums.    Hx of PAF  Hx of pacemaker  --Previously on Xarelto, will resume at discharge.  --Continue Diltiazem and Atenolol.    Headache, persistent  --Tylenol not helping much.  --Says she is allergic to Excedrin.    Sciatic nerve pain  --Add PRN Norco.     DVT Prophylaxis:  Lovenox    (My partner Dr. Gill previously spoke with patient's PCP Dr. Ammy Leblanc (cell [phone]) on 5/7/20 with update on patient's condition. She wishes to be called when patient is being discharged home from rehab so she can coordinate close outpatient follow up. Will need to make that clear in discharge instructions/summary.)    Patient declined family phone call again today. No new updates. Waiting for [facility] approval hopefully tomorrow. If she is able to be weaned completely off oxygen, she may decide to go home  instead, as one of her reasons to go to rehab was that she felt uncomfortable with going home on oxygen to her  who has early dementia and is a smoker. Continue PT/OT while we wait.    Disposition: I expect the patient to be discharged to [facility] vs home 1-2 days    CODE STATUS:   Code Status and Medical Interventions:   Ordered at: 20     Code Status:    CPR     Medical Interventions (Level of Support Prior to Arrest):    Full         Electronically signed by Shannan Etienne MD, 05/10/20, 12:59.        Electronically signed by Shannan Etienne MD at 05/10/20 1259     Shannan Etienne MD at 20 [phone]              [facility] Medicine Services  PROGRESS NOTE    Patient Name: Marlene Aguirre  : 1955  MRN: [id number]    Date of Admission: 2020  Primary Care Physician: Provider, No Known    Subjective   Subjective     CC:  F/U COVID-19 infection    HPI:  Patient seen this morning, interviewed via telephone. She complains of a headache and feeling tired, but overall her breathing is much better. Still on 3 liters oxygen. She has been working with PT and is hopeful to work with them again this weekend.    Review of Systems  Gen-no fevers, no chills  CV-no chest pain, no palpitations  Resp-+mild dry cough, no dyspnea  GI-no N/V/D, no abd pain    All other systems reviewed and negative except any additional pertinent positives and negatives as discussed in HPI.      Objective   Objective     Vital Signs:   Temp:  [96.7 °F (35.9 °C)-98.7 °F (37.1 °C)] 97.9 °F (36.6 °C)  Heart Rate:  [59-73] 73  Resp:  [14-18] 16  BP: ()/(64-84) 108/69        Physical Exam:  With patient's consent, physical exam was conducted via visual telemedicine encounter with bedside portion accommodated by nursing staff due to patient's current isolation requirements in the interest of PPE conservation.    Constitutional: No acute distress, awake, alert, nontoxic, normal body habitus, sitting  up in bed  Respiratory: good effort, nonlabored respirations on 3 L  Cardiovascular:  tele with NSR  Musculoskeletal: No edema, normal muscle tone and mass for age  GI: Soft, no grimace, nondistended  Psychiatric: Appropriate affect, good insight and judgement, cooperative  Neurologic: Oriented x 3, movements symmetric BUE and BLE, speech clear and fluent      Results Reviewed:  Results from last 7 days   Lab Units 05/08/20  0424 05/07/20  0556 05/04/20  0408   WBC 10*3/mm3 6.21 6.24 7.40   HEMOGLOBIN g/dL 12.9 13.9 13.4   HEMATOCRIT % 39.6 41.1 41.0   PLATELETS 10*3/mm3 311 283 317     Results from last 7 days   Lab Units 05/09/20  0542 05/08/20  1043 05/08/20  0641 05/07/20  0556   SODIUM mmol/L 139  --  140 135*   POTASSIUM mmol/L 4.3  --  4.3 4.2   CHLORIDE mmol/L 99  --  100 98   CO2 mmol/L 28.0  --  27.0 26.0   BUN mg/dL 15  --  15 16   CREATININE mg/dL 0.63  --  0.65 0.62   GLUCOSE mg/dL 101*  --  116* 97   CALCIUM mg/dL 8.6  --  8.4* 8.7   ALT (SGPT) U/L  --   --  16  --    AST (SGOT) U/L  --   --  23  --    TROPONIN T ng/mL  --  <0.010 <0.010  --    PROBNP pg/mL  --   --  84.0  --      Estimated Creatinine Clearance: 108 mL/min (by C-G formula based on SCr of 0.63 mg/dL).    Microbiology Results Abnormal     Procedure Component Value - Date/Time    CORONAVIRUS (COVID-19),RT-PCR,[facility], NP SWAB IN LEXAR SALINE MEDIA - Swab, Nasopharynx [[id number]]  (Abnormal) Collected:  05/07/20 1425    Lab Status:  Final result Specimen:  Swab from Nasopharynx Updated:  05/08/20 1636     Reference Lab Report --     Comment: See scanned report          COVID19 Detected    Blood Culture - Blood, Arm, Right [[id number]] Collected:  04/29/20 1600    Lab Status:  Final result Specimen:  Blood from Arm, Right Updated:  05/04/20 1615     Blood Culture No growth at 5 days    Blood Culture - Blood, Arm, Right [[id number]] Collected:  04/29/20 1550    Lab Status:  Final result Specimen:  Blood from Arm, Right Updated:  05/04/20  1615     Blood Culture No growth at 5 days    Legionella Antigen, Urine - Urine, Urine, Clean Catch [[id number]]  (Normal) Collected:  04/30/20 0911    Lab Status:  Final result Specimen:  Urine, Clean Catch Updated:  04/30/20 1302     LEGIONELLA ANTIGEN, URINE Negative    S. Pneumo Ag Urine or CSF - Urine, Urine, Clean Catch [[id number]]  (Normal) Collected:  04/30/20 0911    Lab Status:  Final result Specimen:  Urine, Clean Catch Updated:  04/30/20 1302     Strep Pneumo Ag Negative    MRSA Screen, PCR (Inpatient) - Swab, Nares [[id number]]  (Normal) Collected:  04/29/20 2157    Lab Status:  Final result Specimen:  Swab from Nares Updated:  04/30/20 0037     MRSA PCR Negative    Narrative:       MRSA Negative    SARS-CoV-2 PCR ([facility] IN-HOUSE PERFORMED), NP SWAB IN TRANSPORT MEDIA - Swab, Nasopharynx [[id number]]  (Abnormal) Collected:  04/29/20 1644    Lab Status:  Final result Specimen:  Swab from Nasopharynx Updated:  04/30/20 0003     COVID19 Detected    Respiratory Panel, PCR - Swab, Nasopharynx [[id number]]  (Normal) Collected:  04/29/20 1644    Lab Status:  Final result Specimen:  Swab from Nasopharynx Updated:  04/29/20 1802     ADENOVIRUS, PCR Not Detected     Coronavirus 229E Not Detected     Coronavirus HKU1 Not Detected     Coronavirus NL63 Not Detected     Coronavirus OC43 Not Detected     Human Metapneumovirus Not Detected     Human Rhinovirus/Enterovirus Not Detected     Influenza B PCR Not Detected     Parainfluenza Virus 1 Not Detected     Parainfluenza Virus 2 Not Detected     Parainfluenza Virus 3 Not Detected     Parainfluenza Virus 4 Not Detected     Bordetella pertussis pcr Not Detected     Influenza A H1 2009 PCR Not Detected     Chlamydophila pneumoniae PCR Not Detected     Mycoplasma pneumo by PCR Not Detected     Influenza A PCR Not Detected     Influenza A H3 Not Detected     Influenza A H1 Not Detected     RSV, PCR Not Detected     Bordetella parapertussis PCR Not Detected    Narrative:        The coronavirus on the RVP is NOT COVID-19 and is NOT indicative of infection with COVID-19.           Imaging Results (Last 24 Hours)     ** No results found for the last 24 hours. **          Results for orders placed during the hospital encounter of 04/29/20   Adult Transthoracic Echo Complete W/ Cont if Necessary Per Protocol    Narrative · Calculated EF = 59.0%.  · Left ventricular wall thickness is consistent with mild concentric   hypertrophy.  · Left ventricular systolic function is normal.  · Left ventricular diastolic dysfunction (grade I) consistent with   impaired relaxation.  · No significant structural or functional valvular disease.          I have reviewed the medications:  Scheduled Meds:  atenolol 25 mg Oral Daily   dilTIAZem  mg Oral Daily   enoxaparin 1 mg/kg Subcutaneous Q12H   furosemide 20 mg Oral Daily   pantoprazole 40 mg Oral Q AM   pravastatin 40 mg Oral Nightly   sodium chloride 10 mL Intravenous Q12H     Continuous Infusions:   PRN Meds:.•  acetaminophen  •  albuterol sulfate HFA  •  aspirin-acetaminophen-caffeine  •  calcium carbonate EX  •  diphenhydrAMINE  •  magnesium sulfate **OR** magnesium sulfate in D5W 1g/100mL (PREMIX) **OR** magnesium sulfate  •  potassium chloride  •  sodium chloride  •  sodium chloride  •  sodium chloride    Assessment/Plan   Assessment & Plan     Active Hospital Problems    Diagnosis  POA   • **COVID-19 Pneumonitis [U07.1, J12.89]  Yes   • Acute hypoxemic respiratory failure (CMS/HCC) [J96.01]  Yes   • Fever [R50.9]  Yes   • Paroxysmal atrial fibrillation (CMS/HCC) [I48.0]  Yes   • Obesity (BMI 30-39.9) [E66.9]  Yes      Resolved Hospital Problems   No resolved problems to display.        Brief Hospital Course to date:  Marlene Aguirre is a 64 y.o. female with hx of Afib on Xarelto, pacemaker, asthma, and obesity who presents to the ER on 4/29/20 due to progressive cough, dyspnea, and fever despite being placed on Zithromax as an outpatient. She  was hypoxic and febrile on arrival. CXR showed patchy airspace disease in the right mid and lower lung, and D-dimer was mildly elevated. V/Q scan was low probability for PE. Subsequent CT chest without contrast showed extensive multifocal regions of moderately dense, patchy ground glass opacities throughout both lungs, typical for COVID-19. Her COVID-19 PCR returned POSITIVE. In the hours following hospital admission, patient had worsening O2 requirements (up to 6 liters) and was transferred to the ICU. She received a dose of Actemra to prevent cytokine storm as well as convalescent plasma per Infectious Disease recommendations. Patient required up to 80% high flow nasal cannula but improved down to 3 liters and was transferred to the floor with hospitalist service resuming care on 5/7. In the early morning hours on 5/8, she had chest pressure which resolved after nitroglycerin as well as gas pains and belching which resolved with Tums. EKG and troponins were negative and Echo was unremarkable aside from diastolic dysfunction.    Of note, patient's sister is currently hospitalized with COVID-19 as well. Patient reports her son as well as granddaughter also have tested positive. It seems there was a physical therapist who had come to work with patient's son during home PT who had tested positive, so likely the initial source of transmission.    *All problems are new to me today.    COVID-19 pneumonitis  Acute hypoxic respiratory failure  --s/p Actemra (4/29) and convalescent plasma (4/30).  --ID following. Inflammatory markers trending down.  --Now on 3 liters. Continue to wean as tolerated. Encouraged IS.  --Mobilize with PT/OT as tolerated.    Transient atypical chest pain 5/8/20  --EKG and troponins unremarkable. Echo 5/8 personally reviewed and unremarkable aside from diastolic dysfunction.   --Now chest pain free, likely GI related (improved with Tums). Continue Protonix and Tums.    Hx of PAF  Hx of  "pacemaker  --Previously on Xarelto, will resume at discharge.  --Continue Diltiazem and Atenolol.    Headache, persistent  --Tylenol not helping much.  --Will try PRN Excedrin x 2 days.     DVT Prophylaxis:  Lovenox    (My partner Dr. Gill previously spoke with patient's PCP Dr. Ammy Leblanc (cell [phone]) on 20 with update on patient's condition. She wishes to be called when patient is being discharged home from rehab so she can coordinate close outpatient follow up. Will need to make that clear in discharge instructions/summary.)    Patient declined family phone call today. No new updates. Waiting for [facility] approval hopefully early next week.    Disposition: I expect the patient to be discharged to [facility] early next week    CODE STATUS:   Code Status and Medical Interventions:   Ordered at: 20     Code Status:    CPR     Medical Interventions (Level of Support Prior to Arrest):    Full         Electronically signed by Shannan Etienne MD, 20, 15:17.        Electronically signed by Shannan Etienne MD at 20 1517     Rishi Gill MD at 20 1752              [facility] Medicine Services  PROGRESS NOTE    Patient Name: Marlene Aguirre  : 1955  MRN: [id number]    Date of Admission: 2020  Primary Care Physician: Provider, No Known    Subjective   Subjective     CC:  covid pneumonitis    HPI:  Had chest pain last night she feels likely was \"gas\". No pain now. Dyspnea slightly improved. No sputum. No fever. No n/v/d.    Review of Systems  No headache    Objective   Objective     Vital Signs:   Temp:  [97.1 °F (36.2 °C)-98.8 °F (37.1 °C)] 98.6 °F (37 °C)  Heart Rate:  [61-73] 64  Resp:  [16-18] 18  BP: (116-136)/(65-84) 130/84        With patient's consent, physical exam was conducted via visual telemedicine encounter w/ direct visualization of patient through window while simultaneously talking w/ patient on phone. This is done due to " isolation requirements in the interest of PPE conservation.    Physical Exam:  Constitutional:Alert, oriented x 3, nontoxic appearing, obese, normal respiratory effort  Psych:Normal/appropriate affect  HEENT:[place]cat, neck w/ full range of motion  Neuro: Face symmetric, speech clear, equal , moves all extremities  Cardiac: sinus rhythm on tele monitor; no pitting edema  Resp: symmetric chest rise and fall, normal respiratory effort at rest  GI: abd soft, obese, nontender (during self-palpation)  Skin: No extremity rash      Results Reviewed:  Results from last 7 days   Lab Units 05/08/20  0424 05/07/20  0556 05/04/20  0408   WBC 10*3/mm3 6.21 6.24 7.40   HEMOGLOBIN g/dL 12.9 13.9 13.4   HEMATOCRIT % 39.6 41.1 41.0   PLATELETS 10*3/mm3 311 283 317     Results from last 7 days   Lab Units 05/08/20  1043 05/08/20  0641 05/07/20  0556 05/05/20  0807   SODIUM mmol/L  --  140 135* 139   POTASSIUM mmol/L  --  4.3 4.2 4.0   CHLORIDE mmol/L  --  100 98 96*   CO2 mmol/L  --  27.0 26.0 30.0*   BUN mg/dL  --  15 16 23   CREATININE mg/dL  --  0.65 0.62 0.64   GLUCOSE mg/dL  --  116* 97 100*   CALCIUM mg/dL  --  8.4* 8.7 8.7   ALT (SGPT) U/L  --  16  --   --    AST (SGOT) U/L  --  23  --   --    TROPONIN T ng/mL <0.010 <0.010  --   --    PROBNP pg/mL  --  84.0  --   --      Estimated Creatinine Clearance: 104.6 mL/min (by C-G formula based on SCr of 0.65 mg/dL).    Microbiology Results Abnormal     Procedure Component Value - Date/Time    CORONAVIRUS (COVID-19),RT-PCR,[facility], NP SWAB IN [facility] MEDIA - Swab, Nasopharynx [[id number]]  (Abnormal) Collected:  05/07/20 1420    Lab Status:  Final result Specimen:  Swab from Nasopharynx Updated:  05/08/20 1636     Reference Lab Report --     Comment: See scanned report          COVID19 Detected    Blood Culture - Blood, Arm, Right [[id number]] Collected:  04/29/20 1600    Lab Status:  Final result Specimen:  Blood from Arm, Right Updated:  05/04/20 1615     Blood Culture No  growth at 5 days    Blood Culture - Blood, Arm, Right [[id number]] Collected:  04/29/20 1550    Lab Status:  Final result Specimen:  Blood from Arm, Right Updated:  05/04/20 1615     Blood Culture No growth at 5 days    Legionella Antigen, Urine - Urine, Urine, Clean Catch [[id number]]  (Normal) Collected:  04/30/20 0911    Lab Status:  Final result Specimen:  Urine, Clean Catch Updated:  04/30/20 1302     LEGIONELLA ANTIGEN, URINE Negative    S. Pneumo Ag Urine or CSF - Urine, Urine, Clean Catch [[id number]]  (Normal) Collected:  04/30/20 0911    Lab Status:  Final result Specimen:  Urine, Clean Catch Updated:  04/30/20 1302     Strep Pneumo Ag Negative    MRSA Screen, PCR (Inpatient) - Swab, Nares [[id number]]  (Normal) Collected:  04/29/20 2157    Lab Status:  Final result Specimen:  Swab from Nares Updated:  04/30/20 0037     MRSA PCR Negative    Narrative:       MRSA Negative    SARS-CoV-2 PCR ([place] IN-HOUSE PERFORMED), NP SWAB IN TRANSPORT MEDIA - Swab, Nasopharynx [[id number]]  (Abnormal) Collected:  04/29/20 1644    Lab Status:  Final result Specimen:  Swab from Nasopharynx Updated:  04/30/20 0003     COVID19 Detected    Respiratory Panel, PCR - Swab, Nasopharynx [[id number]]  (Normal) Collected:  04/29/20 1644    Lab Status:  Final result Specimen:  Swab from Nasopharynx Updated:  04/29/20 1802     ADENOVIRUS, PCR Not Detected     Coronavirus 229E Not Detected     Coronavirus HKU1 Not Detected     Coronavirus NL63 Not Detected     Coronavirus OC43 Not Detected     Human Metapneumovirus Not Detected     Human Rhinovirus/Enterovirus Not Detected     Influenza B PCR Not Detected     Parainfluenza Virus 1 Not Detected     Parainfluenza Virus 2 Not Detected     Parainfluenza Virus 3 Not Detected     Parainfluenza Virus 4 Not Detected     Bordetella pertussis pcr Not Detected     Influenza A H1 2009 PCR Not Detected     Chlamydophila pneumoniae PCR Not Detected     Mycoplasma pneumo by PCR Not Detected      Influenza A PCR Not Detected     Influenza A H3 Not Detected     Influenza A H1 Not Detected     RSV, PCR Not Detected     Bordetella parapertussis PCR Not Detected    Narrative:       The coronavirus on the RVP is NOT COVID-19 and is NOT indicative of infection with COVID-19.           Imaging Results (Last 24 Hours)     Procedure Component Value Units Date/Time    XR Chest 1 View [[id number]] Collected:  05/08/20 0708     Updated:  05/08/20 0710    Narrative:       CR Chest 1 Vw    INDICATION:   Acute respiratory failure with hypoxia and chest pain. COVID 19     COMPARISON:    5/4/2020    FINDINGS:  Single portable AP view(s) of the chest.    Pacing device overlying the left aspect.    The heart size is mildly enlarged. Mediastinal structures are midline.    Redemonstrated are bilateral patchy infiltrates, right side greater than left. No obvious pleural fluid. No pneumothorax.       Impression:       1.  Mild cardiomegaly.    2.  Patchy bilateral airspace disease, right side greater than left. Slight interval worsening when compared to prior exam.    Signer Name: Jonnathan Fleming MD   Signed: 5/8/2020 7:08 AM   Workstation Name: [id number]    [facility]          Results for orders placed during the hospital encounter of 04/29/20   Adult Transthoracic Echo Complete W/ Cont if Necessary Per Protocol    Narrative · Calculated EF = 59.0%.  · Left ventricular wall thickness is consistent with mild concentric   hypertrophy.  · Left ventricular systolic function is normal.  · Left ventricular diastolic dysfunction (grade I) consistent with   impaired relaxation.  · No significant structural or functional valvular disease.          I have reviewed the medications:  Scheduled Meds:    atenolol 25 mg Oral Daily   dilTIAZem  mg Oral Daily   enoxaparin 1 mg/kg Subcutaneous Q12H   furosemide 20 mg Oral Daily   furosemide 20 mg Oral Once   pantoprazole 40 mg Oral Q AM   pravastatin 40 mg Oral  "Nightly   sodium chloride 10 mL Intravenous Q12H     Continuous Infusions:   PRN Meds:.•  acetaminophen  •  albuterol sulfate HFA  •  calcium carbonate EX  •  diphenhydrAMINE  •  magnesium sulfate **OR** magnesium sulfate in D5W 1g/100mL (PREMIX) **OR** magnesium sulfate  •  potassium chloride  •  sodium chloride  •  sodium chloride  •  sodium chloride    Assessment/Plan   Assessment & Plan     Active Hospital Problems    Diagnosis  POA   • **COVID-19 Pneumonitis [U07.1, J12.89]  Yes   • Acute hypoxemic respiratory failure (CMS/HCC) [J96.01]  Yes     Priority: High   • Fever [R50.9]  Yes   • Paroxysmal atrial fibrillation (CMS/HCC) [I48.0]  Yes   • Obesity (BMI 30-39.9) [E66.9]  Yes      Resolved Hospital Problems   No resolved problems to display.        Brief Hospital Course to date:  Marlene Aguirre is a 64 y.o. female w/ hx afib/pacemaker (on xarelto anticoagulation, follows w/ Dr. Gaytan at [facility]), asthma, obesity who presented to [facility] ED on 4/29/20 with progressive cough, dyspnea, fever despite being placed on course of zithromax in outpatient setting. Upon arrival patient was hypoxic & febrile.Cxr showed patchy air space disease. D-dimer was mildly elevated, V/Q scan was low probability for pulmonary embolism. Covid-19 pcr testing was positive. Anticoagulation was changed from xarelto to therapeutic lovenox. In the hours after admission patient's oxygen requirements increased and patient was transferred to ICU where patient received actemra and convalescent plasma. Patient required up to 80% fio2 on hi-flow cannula but oxygen requirements improved and was transferred out to telemetry/covid unit on 5/7/20 with 3Lnc requirements. Inflammatory markers have downtrended. Early morning hours of 5/8/20 developed chest pressure which resolved after nitroglycerin, and patient also developed belching & \"gas pains\" which resolved w/ tums. troponins were negative, ekg unchanged. Echo showed normal EF w/ " "diastolic dysfunction but normal wall motion.      *Covid Pneumonitis   -s/p serum convalescent plasma therapy on 4/30/20  *s/p Cytokine Storm (due to above), improving   -s/c actemra on 4/29/20  *Acute hypoxic respiratory failure (due to above), improving  *Transient atypical chest pain (early morning 5/8/20), resolved   -ekg 5/8/20 unchanged from prior (twave flattening), troponins negative, echo normal EF & normal wall motion   -already anticoagulated on lovenox  *Hx Paroxysmal Afib/Hx pacemaker  *Obesity  *hx asthma  *Patient claims history of PCN allergy (questionable throat swelling); doxycycline intolerance    Plan:  -now chest pain free, was atypical w/ associated belching & \"gas\", now resolved. Echo was normal ef and wall motion, troponin was negative. Already anticoagulated w/ lovenox. Added protonix & tums.    -ID following, inflammatory markers downtrending (received actemra & convalescent plasma in ICU). Seems to be making slow but steady progress. Repeat covid-19 lexar test still + today.    -Will give extra lasix 20mg po x 1. Continue dilt & atenolol. Continue therapeutic lovenox for now, anticipate transition back to xarelto at discharge    -I called patient's pcp (Dr. Ammy Leblanc cell [phone]) on 5/7/20 to update her regarding patient's hospital course and she appreciated the call. She wishes to be called on her cell when patient is being discharged home (from either the hospital or from rehab) to coordinate outpatient follow up.    Dispo: patient no longer feels comfortable going home on oxygen, as  has some early dementia & is a smoker. Making referrals to [facility] (as they are able to take patient's who are covid 19 +). Anticipate possible discharge to [facility] early next week if continues to clinically improve    DVT Prophylaxis:  Sq therapeutic lovenox    Daily Care Communication  Due to current limited visitation policies, an attempt will be made daily to update patient's identified " best point-of-contact(s)   Contact: Cecilio Jones   Relation: son   Type of communication (phone or televideo): phone   Time of communication: 18:14   Notes (if applicable): appreciated the call     CODE STATUS:   Code Status and Medical Interventions:   Ordered at: 04/29/20 1943     Code Status:    CPR     Medical Interventions (Level of Support Prior to Arrest):    Full     40 min spent on patient care; >50% time spent coordinating care & discussing with consultant (Dr. Horn), discussing at multidisciplinary rounds, and updating/counseling son      Electronically signed by Rishi Gill MD, 05/08/20, 17:52.        Electronically signed by Rishi Gill MD at 05/08/20 1817     Braxton Horn MD at 05/08/20 1446          INFECTIOUS DISEASE CONSULT/INITIAL HOSPITAL VISIT    Marlene Aguirre  1955  [id number]    Date of Consult: 5/8/2020    Admission Date: 4/29/2020      Requesting Provider: Rishi Gill MD  Evaluating Physician: Braxton Horn MD    Reason for Consultation: COVID-19    History of present illness:    Patient is a 64 y.o. female who has intermittent asthma who has history of tobacco use and quit 3 years ago has multiple family members living with her.  Apparently patient developed headache, diarrhea, dry cough was given a Z-Jules by primary care physician on April 27,020 patient had progressive cough and fever and hypoxia on April 29.    Apparently a therapist that was coming to the house tested positive for COVID prior to patient's symptoms.    Patient admitted to the floor but because of worsening hypoxia and fevers was transferred to the intensive care unit.  Patient given 1 dose of 800 mg of Actemra last night    Patient is stable hemodynamically and on oxygen by nasal cannula currently her sats on 6 L are 91%    CT scan performed high probability for COVID-19 with bilateral groundglass opacities    COVID PCR positive    5/1/2020 patient received 2 units of  convalescent serum plasma yesterday is received IV Lasix as well remains on 6 L with O2 sats between 88% and 90%.  Patient is a good spirits is sitting in chair is able to order food for nutrition per telephone call.  Per nursing staff had liquid bowel movements x3 yesterday is otherwise afebrile and hemodynamically stable    Review of systems remark for diarrhea no fevers    5/2/2020; patient required 8 L of oxygen by nasal cannula last night is otherwise hemodynamically stable.  Patient is eating small amounts reports that she ate for [employer]t yesterday with the food otherwise states like cardboard.    Patient has intermittent cough which is productive.  She denies fevers rash sore throat ;    Patient was talked to by telephone and inspected through the window.    5/3/2020; patient is doing fair he is on high flow nasal cannula now with better O2 sats but is on increasing amounts of oxygen.  Patient is afebrile and hemodynamically stable patient is being diuresed by pulmonary.  Does not report rash or sore throat patient spoke to over the telephone and visualized through the window    5/4/20; doing well; reports nose is stopped up. no events overnight; afebrile, hemodynamically stable; on high flow nasal cannula this is being weaned; denies chest pain, but does have intermittent coughing. Eating better.   Patient inspected from window and talked to by telephone    5/5/2020 patient is on less oxygen has been transitioned from high flow nasal cannula to regular nasal cannula on 6 L currently patient is resting quietly; reports congestion, abdominal cramping, diarrhea.   5/6/2020 patient is on 3 L oxygen is afebrile and hemodynamically stable to be transferred to floor good spirits no complaints today    5/7/20; doing well; no events overnight; no rash, sore throat, diarrhea; transferred to floor; requiring 3 liters o2; glad to be out of ICU; breathing is ok; still has cough, feels a little heaviness in chest, and  dyspnea; feels better overall;     5/8/2020 patient is feeling fair did have some chest pain with radiation to the left scapula last night cardiology is being involved cardiac markers been obtained been negative patient denies shortness of breath denies fevers is not well rested today no other complaints    Past Medical History:   Diagnosis Date   • Asthma    • Atrial fibrillation (CMS/HCC)    • Coronary artery disease    • Hypertension        Past Surgical History:   Procedure Laterality Date   • BILATERAL BREAST REDUCTION     • BRAIN SURGERY     • DILATATION AND CURETTAGE     • PACEMAKER IMPLANTATION         History reviewed. No pertinent family history.    Social History     Socioeconomic History   • Marital status:      Spouse name: Not on file   • Number of children: Not on file   • Years of education: Not on file   • Highest education level: Not on file   Tobacco Use   • Smoking status: Former Smoker   • Smokeless tobacco: Never Used   • Tobacco comment: QUIT 3 YEARS AGO   Substance and Sexual Activity   • Alcohol use: Never     Frequency: Never   • Drug use: Never   • Sexual activity: Defer       Allergies   Allergen Reactions   • Contrast Dye Swelling     FACE, NOSE AND NECK SWELLING AS WELL AS SANDI    • Doxycycline Unknown - Low Severity     PT UNSURE OF REACTION    • Flexeril [Cyclobenzaprine] Hives   • Keflex [Cephalexin] Unknown - Low Severity     PT DOES NOT REMEMBER    • Kenalog [Triamcinolone Acetonide] Itching   • Penicillins Hives   • Sulfa Antibiotics Itching   • Tetracyclines & Related Unknown - Low Severity     PT UNSURE OF REACTION   • Levaquin [Levofloxacin] Rash          Medication:    Current Facility-Administered Medications:   •  acetaminophen (TYLENOL) tablet 650 mg, 650 mg, Oral, Q6H PRN, Case, Conchita V., DO, 650 mg at 05/08/20 0912  •  albuterol sulfate HFA (PROVENTIL HFA;VENTOLIN HFA;PROAIR HFA) inhaler 2 puff, 2 puff, Inhalation, Q4H PRN, Case, Conchita V., DO, 2 puff at 04/30/20  1612  •  atenolol (TENORMIN) tablet 25 mg, 25 mg, Oral, Daily, Case, Conchita V., DO, 25 mg at 05/08/20 0912  •  calcium carbonate EX (TUMS EX) chewable tablet 1,500 mg, 1,500 mg, Oral, TID PRN, Rishi Gill MD, 1,500 mg at 05/08/20 1157  •  dilTIAZem CD (CARDIZEM CD) 24 hr capsule 240 mg, 240 mg, Oral, Daily, Case, Conchita V., DO, 240 mg at 05/08/20 0912  •  diphenhydrAMINE (BENADRYL) capsule 50 mg, 50 mg, Oral, BID PRN, Case, Conchita V., DO, 50 mg at 05/02/20 1607  •  enoxaparin (LOVENOX) syringe 120 mg, 1 mg/kg, Subcutaneous, Q12H, Case, Conchita V., DO, 120 mg at 05/08/20 0912  •  furosemide (LASIX) tablet 20 mg, 20 mg, Oral, Daily, Rishi Gill MD, 20 mg at 05/08/20 0912  •  magnesium sulfate 4 gram infusion - Mg less than or equal to 1mg/dL, 4 g, Intravenous, PRN **OR** magnesium sulfate 3 gram infusion (1gm x 3) - Mg 1.1 - 1.5 mg/dL, 1 g, Intravenous, PRN **OR** Magnesium Sulfate 2 gram infusion- Mg 1.6 - 1.9 mg/dL, 2 g, Intravenous, PRN, Case, Conchita V., DO, Last Rate: 25 mL/hr at 05/01/20 0623, 2 g at 05/01/20 0623  •  pantoprazole (PROTONIX) EC tablet 40 mg, 40 mg, Oral, Q AM, Rishi Gill MD, 40 mg at 05/08/20 1157  •  potassium chloride (MICRO-K) CR capsule 40 mEq, 40 mEq, Oral, PRN, Case, Conchita V., DO, 40 mEq at 04/30/20 1221  •  pravastatin (PRAVACHOL) tablet 40 mg, 40 mg, Oral, Nightly, Case, Conchita V., DO, 40 mg at 05/07/20 2101  •  sodium chloride 0.9 % flush 10 mL, 10 mL, Intravenous, PRN, Case, Conchita V., DO  •  sodium chloride 0.9 % flush 10 mL, 10 mL, Intravenous, Q12H, Case, Conchita V., DO, 10 mL at 05/08/20 0913  •  sodium chloride 0.9 % flush 10 mL, 10 mL, Intravenous, PRN, Case, Conchita V., DO, 10 mL at 05/01/20 0001  •  sodium chloride nasal spray 2 spray, 2 spray, Each Nare, PRN, Case, Conchita V., DO, 2 spray at 05/04/20 2016    Antibiotics:  Anti-Infectives (From admission, onward)    Ordered     Dose/Rate Route Frequency Start Stop    04/29/20 1943  vancomycin  2250 mg/500 mL 0.9% NS IVPB ([facility])     Ordering Provider:  Sondra Worrell RPH    20 mg/kg × 118 kg  over 150 Minutes Intravenous Once 20 0022    20 1943  aztreonam (AZACTAM) 1 g in 50 mL NS IVPB     Ordering Provider:  Sondra Worrell RPH    1 g  over 30 Minutes Intravenous Once 20 0200            Review of Systems:  See HPI      Physical Exam:   Vital Signs  Temp (24hrs), Av.9 °F (36.6 °C), Min:97.1 °F (36.2 °C), Max:98.8 °F (37.1 °C)    Temp  Min: 97.1 °F (36.2 °C)  Max: 98.8 °F (37.1 °C)  BP  Min: 116/70  Max: 136/83  Pulse  Min: 61  Max: 73  Resp  Min: 16  Max: 18  SpO2  Min: 92 %  Max: 94 %  Patient seen through window and talked to on the telephone  GENERAL: Resting quietly in bed is on  oxygen by nasal cannula  HEENT: Normocephalic, atraumatic.  EOMI no external oral lesions    HEART: Sinus rhythm by telemetry  LUNGS: no respiratory distress  ABDOMEN: Bowel sounds were present.  No obvious abdominal distention on exam  Skin abrasions right hand make thumbs up well visualized through the window      NEURO: Oriented to PPT.  Able to have an appropriate conversation  PSYCHIATRIC:  Normal affect    Laboratory Data    Results from last 7 days   Lab Units 20  0424 20  0556 20  0408   WBC 10*3/mm3 6.21 6.24 7.40   HEMOGLOBIN g/dL 12.9 13.9 13.4   HEMATOCRIT % 39.6 41.1 41.0   PLATELETS 10*3/mm3 311 283 317     Results from last 7 days   Lab Units 20  0641   SODIUM mmol/L 140   POTASSIUM mmol/L 4.3   CHLORIDE mmol/L 100   CO2 mmol/L 27.0   BUN mg/dL 15   CREATININE mg/dL 0.65   GLUCOSE mg/dL 116*   CALCIUM mg/dL 8.4*     Results from last 7 days   Lab Units 20  0641   ALK PHOS U/L 43   BILIRUBIN mg/dL 0.2   ALT (SGPT) U/L 16   AST (SGOT) U/L 23         Results from last 7 days   Lab Units 20  0408   CRP mg/dL 0.66*                 Estimated Creatinine Clearance: 104.6 mL/min (by C-G formula based on SCr of 0.65  mg/dL).      Microbiology:  No results found for: ACANTHNAEG, AFBCX, BPERTUSSISCX, BLOODCX  No results found for: BCIDPCR, CXREFLEX, CSFCX, CULTURETIS  No results found for: CULTURES, HSVCX, URCX  No results found for: EYECULTURE, GCCX, LABHSV  No results found for: LEGIONELLA, MRSACX, MUMPSCX, MYCOPLASCX  No results found for: NOCARDIACX, STOOLCX  No results found for: THROATCX, UNSTIMCULT, URINECX, CULTURE, VZVCULTUR  No results found for: VIRALCULTU, WOUNDCX        Radiology:  Imaging Results (Last 72 Hours)     Procedure Component Value Units Date/Time    XR Chest 1 View [[id number]] Collected:  05/08/20 0708     Updated:  05/08/20 0710    Narrative:       CR Chest 1 Vw    INDICATION:   Acute respiratory failure with hypoxia and chest pain. COVID 19     COMPARISON:    5/4/2020    FINDINGS:  Single portable AP view(s) of the chest.    Pacing device overlying the left aspect.    The heart size is mildly enlarged. Mediastinal structures are midline.    Redemonstrated are bilateral patchy infiltrates, right side greater than left. No obvious pleural fluid. No pneumothorax.       Impression:       1.  Mild cardiomegaly.    2.  Patchy bilateral airspace disease, right side greater than left. Slight interval worsening when compared to prior exam.    Signer Name: Jonnathan Fleming MD   Signed: 5/8/2020 7:08 AM   Workstation Name: [id number]    [facility]            Impression:   CO VID 19 pneumonitis  Hypoxia  Acute respiratory failure improving  Probable cytokine storm manifested by elevated ferritin elevated LDH, hypoxia  Penicillin allergy; questionable throat swelling  Doxycycline intolerance     Patient has received Actemra on 4/29  Received 2 units of serum convalescent plasma therapy on 4/30    PLAN/RECOMMENDATIONS:   Thank you for asking us to see Marlene Aguirre, I recommend the following:      Of note COVID-19 can have complications including myocarditis and probably  pericarditis.    Patient is going to be eval by cardiology because of chest pain.    Given the patient's age and obesity patient is probably at risk for some sort of coronary artery disease with angina  Agree with Lovenox for DVT prophylaxis  Continue pravastatin for cardioprotection        Cytokine storm appears to be resolving    IL-6 level is 130 on 4/30/2020 prior to dose of Actemra    5/6/20 ferritin and LDH downtrending        Continue supplemental oxygen    Patient reluctant to go home because her  smokes and has dementia; patient may be a candidate for [facility] accepting patients    As suggested by Dr. Gill will repeat nasopharyngeal PCR for COVID-19; while expect this to stay positive would be interesting for placement and isolation purposes if patient converted to negative PCR test    Braxton Horn MD  5/8/2020  14:46                    Electronically signed by Braxton Horn MD at 05/08/20 1448       Dianelys Sharp, RN   Registered Nurse   Case Management   Progress Notes   Signed   Date of Service:  05/08/20 1505   Creation Time:  05/08/20 1505            Signed             Show:Clear all  []Manual[x]Template[]Copied    Added by:  [x]Dianelys Sharp, RN    []Ben for details  Continued Stay Note  [facility]     Patient Name: Marlene Aguirre                MRN: [id number]  Today's Date: 5/8/2020                       Admit Date: 4/29/2020          Discharge Plan      Row Name 05/08/20 1448           Plan     Plan  Referral to [facility]      Patient/Family in Agreement with Plan  yes     Plan Comments  Spoke with patient over the phone due to covid-19 diagnosis - She states she no longer feels comfortable going home and is interested in rehab. Referral called to Jen at [facility] as they are the only ones taking positive patients at this time in [facility]. Patient is not comfortable going home on oxygen as her  is a smoker and alcoholic. Jen will work up  her referral and notify CM of bed availability - CM following -tonya [phone]     Final Discharge Disposition Code  62 - inpatient rehab facility          Discharge Codes    No documentation.              Expected Discharge Date and Time      Expected Discharge Date Expected Discharge Time     May 11, 2020                  Tonya Sharp RN

## 2020-05-11 NOTE — PLAN OF CARE
Patient resting with no complaints. Vss nsr awaiting approval to [facility]. Marie Guzman RN

## 2020-05-11 NOTE — PLAN OF CARE
Problem: Patient Care Overview  Goal: Plan of Care Review  Flowsheets (Taken 5/11/2020 1021)  Progress: improving  Plan of Care Reviewed With: patient  Outcome Summary: Pt increased ambulation distance to 60 ft with RW and CGA-SBA. Distance limited by fatigue, weakness, mild SOA, and LLE sciatic pain. Improved endurance and stability with gait. Good progress, good rehab candidate. Will continue to progress pt as able per POC.

## 2020-05-11 NOTE — THERAPY TREATMENT NOTE
Acute Care - Occupational Therapy Treatment Note  [facility]     Patient Name: Marlene Aguirre  : 1955  MRN: [id number]  Today's Date: 2020             Admit Date: 2020       ICD-10-CM ICD-9-CM   1. Acute hypoxemic respiratory failure (CMS/HCC) J96.01 518.81   2. COVID-19 virus infection U07.1      Patient Active Problem List   Diagnosis   • Acute hypoxemic respiratory failure (CMS/HCC)   • COVID-19 Pneumonitis   • Fever   • Paroxysmal atrial fibrillation (CMS/HCC)   • Chronic anticoagulation   • History of pacemaker   • Multiple allergies (ct scan iv contrast, pcn, keflex, quinalones, tetracyclines, kenalog)   • Obesity (BMI 30-39.9)   • Asthma     Past Medical History:   Diagnosis Date   • Asthma    • Atrial fibrillation (CMS/HCC)    • Coronary artery disease    • Hypertension      Past Surgical History:   Procedure Laterality Date   • BILATERAL BREAST REDUCTION     • BRAIN SURGERY     • DILATATION AND CURETTAGE     • PACEMAKER IMPLANTATION         Therapy Treatment    Rehabilitation Treatment Summary     Row Name 20 1336             Treatment Time/Intention    Discipline  occupational therapist  -LIA      Document Type  therapy note (daily note)  -LIA      Subjective Information  complains of;pain  -LIA      Mode of Treatment  individual therapy;occupational therapy  -LIA      Therapy Frequency (OT Eval)  daily  -LIA      Patient Effort  good  -LIA      Comment  did well with TE, but declined OOB due to sciatica pain  -LIA      Existing Precautions/Restrictions  fall;oxygen therapy device and L/min  -LIA      Equipment Issued to Patient  -- red adrianne  -LIA      Recorded by [LIA] Natalia Garcia, OT 20 1412      Row Name 20 1336             Vital Signs    Post Systolic BP Rehab  128  -LIA      Post Treatment Diastolic BP  70  -LIA      Posttreatment Heart Rate (beats/min)  69  -LIA      Intra SpO2 (%)  -- 90's throughout  -LIA      Post SpO2 (%)  90  -LIA      O2 Delivery Post Treatment   supplemental O2  -LIA      Pre Patient Position  Supine  -LIA      Intra Patient Position  Supine  -LIA      Post Patient Position  Supine  -LIA      Recorded by [LIA] Natalia Garcia, OT 05/11/20 1412      Row Name 05/11/20 1336             Cognitive Assessment/Intervention- PT/OT    Affect/Mental Status (Cognitive)  WFL  -LIA      Orientation Status (Cognition)  oriented to;person  -LIA      Follows Commands (Cognition)  WFL  -LIA      Recorded by [LIA] Natalia Garcia, OT 05/11/20 1412      Row Name 05/11/20 1336             Safety Issues, Functional Mobility    Comment, Safety Issues/Impairments (Mobility)  pt. declined due to sciatic pain of 9/10  -LIA      Recorded by [LIA] Natalia Garcia, OT 05/11/20 1412      Row Name 05/11/20 1336             ADL Assessment/Intervention    BADL Assessment/Intervention  -- pt. declined to attempt due to sciatic pain  -LIA      Recorded by [LIA] Natalia Garcia, OT 05/11/20 1412      Row Name 05/11/20 1336             Therapeutic Exercise    34567 - OT Therapeutic Exercise Minutes  24  -LIA      Recorded by [LIA] Natalia Garcia, OT 05/11/20 1412      Row Name 05/11/20 1336             Therapeutic Exercise    Upper Extremity Range of Motion (Therapeutic Exercise)  shoulder flexion/extension, bilateral;shoulder horizontal abduction/adduction, bilateral;shoulder internal/external rotation, bilateral;elbow flexion/extension, bilateral 6 tband BUE  -LIA      Lower Extremity (Therapeutic Exercise)  gluteal sets  -LIA      Lower Extremity Range of Motion (Therapeutic Exercise)  hip abduction/adduction, bilateral;hip internal/external rotation, bilateral;ankle dorsiflexion/plantar flexion, bilateral knee press  -LIA      Exercise Type (Therapeutic Exercise)  resistive exercises;AROM (active range of motion)  -LIA      Position (Therapeutic Exercise)  supine  -LIA      Sets/Reps (Therapeutic Exercise)  1/12  -ILA      Expected Outcome (Therapeutic Exercise)  improve functional tolerance, self-care  activity;improve performance, BADLs  -LIA      Comment (Therapeutic Exercise)  no rest periods needed today and 02 sats stayed in 90's, some assist with end range sh flex  -JB2      Recorded by [LIA] Natalia Garcia, OT 05/11/20 1412  [JB2] Natalia Garcia, OT 05/11/20 1413      Row Name 05/11/20 1336             Positioning and Restraints    Pre-Treatment Position  in bed  -LIA      Post Treatment Position  bed  -LIA      In Bed  supine;call light within reach;encouraged to call for assist  -LIA      Recorded by [LIA] Natalia Garcia, OT 05/11/20 1412      Row Name 05/11/20 1336             Pain Scale: Numbers Pre/Post-Treatment    Pain Scale: Numbers, Pretreatment  9/10  -LIA      Pain Scale: Numbers, Post-Treatment  9/10  -LIA      Pain Location - Orientation  lower  -LIA      Pain Location  back sciatic pain  -LIA      Pain Intervention(s)  Ambulation/increased activity nurse to check for pain meds  -LIA      Recorded by [LIA] Natalia Garcia, OT 05/11/20 1412      Row Name 05/11/20 1336             Outcome Summary/Treatment Plan (OT)    Daily Summary of Progress (OT)  progress towards functional goals is fair  -LIA      Barriers to Overall Progress (OT)  limited this date only due to sciatic pain  -LIA      Plan for Continued Treatment (OT)  cont OT POC  -LIA      Anticipated Discharge Disposition (OT)  inpatient rehabilitation facility  -LIA      Recorded by [LIA] Natalia Garcia, OT 05/11/20 1412        User Key  (r) = Recorded By, (t) = Taken By, (c) = Cosigned By    Initials Name Effective Dates Discipline    LIA Natalia Garcia, OT 06/08/18 -  OT           Rehab Goal Summary     Row Name 05/11/20 1336             Transfer Goal 1 (OT)    Progress/Outcome (Transfer Goal 1, OT)  goal ongoing  -LIA         Dressing Goal 1 (OT)    Progress/Outcome (Dressing Goal 1, OT)  goal met  -LIA         Toileting Goal 1 (OT)    Progress/Outcome (Toileting Goal 1, OT)  goal ongoing  -LIA          Activity Tolerance Goal 1 (OT)     Progress/Outcome (Activity Tolerance Goal 1, OT)  continuing progress toward goal met with TE today  -LIA        User Key  (r) = Recorded By, (t) = Taken By, (c) = Cosigned By    Initials Name Provider Type Discipline    Natalia Tyson, OT Occupational Therapist OT        Occupational Therapy Education                 Title: PT OT SLP Therapies (Done)     Topic: Occupational Therapy (Done)     Point: ADL training (Done)     Description:   Instruct learner(s) on proper safety adaptation and remediation techniques during self care or transfers.   Instruct in proper use of assistive devices.              Learning Progress Summary           Patient Acceptance, E,D, VU,NR by LIA at 5/10/2020 1446    Comment:  AB, UE TE, AE use    Acceptance, E, VU by  at 5/10/2020 0031    Acceptance, E, NR by  at 5/8/2020 1020    Acceptance, E, NR by CL at 5/7/2020 0904                   Point: Home exercise program (Done)     Description:   Instruct learner(s) on appropriate technique for monitoring, assisting and/or progressing therapeutic exercises/activities.              Learning Progress Summary           Patient Acceptance, E,D, VU,NR by LIA at 5/11/2020 1336    Comment:  UE TBAND exercises, benefits of activity    Acceptance, E,D, VU,NR by LIA at 5/10/2020 1446    Comment:  AB, UE TE, AE use    Acceptance, E, VU by  at 5/10/2020 0031                   Point: Precautions (Done)     Description:   Instruct learner(s) on prescribed precautions during self-care and functional transfers.              Learning Progress Summary           Patient Acceptance, E,D, VU,NR by LIA at 5/10/2020 1446    Comment:  AB, UE TE, AE use    Acceptance, E, VU by  at 5/10/2020 0031    Acceptance, E, NR by CL at 5/8/2020 1020    Acceptance, E, NR by CL at 5/7/2020 0904                   Point: Body mechanics (Done)     Description:   Instruct learner(s) on proper positioning and spine alignment during self-care, functional mobility activities  and/or exercises.              Learning Progress Summary           Patient Acceptance, E,D, VU,NR by  at 5/10/2020 1446    Comment:  AB, UE TE, AE use    Acceptance, E, VU by  at 5/10/2020 0031    Acceptance, E, NR by  at 5/8/2020 1020    Acceptance, E, NR by  at 5/7/2020 0904                               User Key     Initials Effective Dates Name Provider Type Discipline     06/08/18 -  Natalia Garcia, OT Occupational Therapist OT     02/15/19 -  Meena Hutson RN Registered Nurse Nurse     04/03/18 -  Shadia Gama OT Occupational Therapist OT                OT Recommendation and Plan  Outcome Summary/Treatment Plan (OT)  Daily Summary of Progress (OT): progress towards functional goals is fair  Barriers to Overall Progress (OT): limited this date only due to sciatic pain  Plan for Continued Treatment (OT): cont OT POC  Anticipated Discharge Disposition (OT): inpatient rehabilitation facility  Therapy Frequency (OT Eval): daily  Daily Summary of Progress (OT): progress towards functional goals is fair  Plan of Care Review  Plan of Care Reviewed With: patient  Plan of Care Reviewed With: patient  Outcome Summary: Improving endurance, but limited OOB activity today due to 9/10 sciatic pain. Pt. able to completed 12 reps each UE/LE TE using red tband for Upper body.  Cont OT POC as pt. tolerates.  Outcome Measures     Row Name 05/11/20 1336 05/10/20 1446          How much help from another is currently needed...    Putting on and taking off regular lower body clothing?  3  -LIA  3  -LIA     Bathing (including washing, rinsing, and drying)  2  -LIA  2  -LIA     Toileting (which includes using toilet bed pan or urinal)  2  -LIA  2  -LIA     Putting on and taking off regular upper body clothing  3  -LIA  3  -LIA     Taking care of personal grooming (such as brushing teeth)  4  -LIA  4  -LIA     Eating meals  4  -LIA  4  -LIA     AM-PAC 6 Clicks Score (OT)  18  -LIA  18  -LIA        Functional Assessment    Outcome  Measure Options  AM-PAC 6 Clicks Daily Activity (OT)  -LIA  AM-PAC 6 Clicks Daily Activity (OT)  -LIA       User Key  (r) = Recorded By, (t) = Taken By, (c) = Cosigned By    Initials Name Provider Type    Natalia Tyson OT Occupational Therapist           Time Calculation:   Time Calculation- OT     Row Name 05/11/20 1416 05/11/20 1336 05/11/20 0949       Time Calculation- OT    OT Start Time  1336  -LIA  --  --    OT Received On  05/11/20  -LIA  --  --    OT Goal Re-Cert Due Date  05/17/20  -LIA  --  --       Timed Charges    48398 - OT Therapeutic Exercise Minutes  --  24  -LIA  --    16286 - Gait Training Minutes   --  --  30  -VG      User Key  (r) = Recorded By, (t) = Taken By, (c) = Cosigned By    Initials Name Provider Type    Natalia Tyson, OT Occupational Therapist    Elizabeth Prescott, PT Physical Therapist        Therapy Charges for Today     Code Description Service Date Service Provider Modifiers Qty    14256027542 HC OT THER PROC EA 15 MIN 5/10/2020 Natalia Garcia, OT GO 1    55954366084 HC OT THERAPEUTIC ACT EA 15 MIN 5/10/2020 Natalia Garcia OT GO 1    43866165088 HC OT THER PROC EA 15 MIN 5/11/2020 Natalia Garcia OT GO 2               Natalia Garcia OT  5/11/2020

## 2020-05-11 NOTE — PLAN OF CARE
Problem: Patient Care Overview  Goal: Plan of Care Review  Outcome: Ongoing (interventions implemented as appropriate)  Flowsheets (Taken 5/11/2020 1414)  Progress: improving  Plan of Care Reviewed With: patient  Outcome Summary: Improving endurance, but limited OOB activity today due to 9/10 sciatic pain. Pt. able to completed 12 reps each UE/LE TE using red tband for Upper body.  Cont OT POC as pt. tolerates.

## 2020-05-12 VITALS
DIASTOLIC BLOOD PRESSURE: 81 MMHG | SYSTOLIC BLOOD PRESSURE: 120 MMHG | OXYGEN SATURATION: 91 % | WEIGHT: 244 LBS | RESPIRATION RATE: 18 BRPM | HEIGHT: 63 IN | BODY MASS INDEX: 43.23 KG/M2 | TEMPERATURE: 97.6 F | HEART RATE: 62 BPM

## 2020-05-12 PROBLEM — R50.9 FEVER: Status: RESOLVED | Noted: 2020-04-29 | Resolved: 2020-05-12

## 2020-05-12 LAB
REF LAB TEST METHOD: ABNORMAL
SARS-COV-2 RNA RESP QL NAA+PROBE: DETECTED

## 2020-05-12 PROCEDURE — 25010000002 ENOXAPARIN PER 10 MG: Performed by: INTERNAL MEDICINE

## 2020-05-12 PROCEDURE — 99239 HOSP IP/OBS DSCHRG MGMT >30: CPT | Performed by: HOSPITALIST

## 2020-05-12 RX ORDER — GUAIFENESIN 600 MG/1
600 TABLET, EXTENDED RELEASE ORAL EVERY 12 HOURS SCHEDULED
Start: 2020-05-12

## 2020-05-12 RX ORDER — PRAVASTATIN SODIUM 40 MG
40 TABLET ORAL NIGHTLY
Start: 2020-05-12

## 2020-05-12 RX ORDER — HYDROCODONE BITARTRATE AND ACETAMINOPHEN 5; 325 MG/1; MG/1
1 TABLET ORAL EVERY 8 HOURS PRN
Start: 2020-05-12

## 2020-05-12 RX ORDER — ALBUTEROL SULFATE 90 UG/1
2 AEROSOL, METERED RESPIRATORY (INHALATION) EVERY 4 HOURS PRN
Start: 2020-05-12

## 2020-05-12 RX ORDER — FUROSEMIDE 20 MG/1
20 TABLET ORAL DAILY
Start: 2020-05-13

## 2020-05-12 RX ADMIN — ACETAMINOPHEN 650 MG: 325 TABLET, FILM COATED ORAL at 08:27

## 2020-05-12 RX ADMIN — SODIUM CHLORIDE, PRESERVATIVE FREE 10 ML: 5 INJECTION INTRAVENOUS at 08:27

## 2020-05-12 RX ADMIN — HYDROCODONE BITARTRATE AND ACETAMINOPHEN 1 TABLET: 5; 325 TABLET ORAL at 12:52

## 2020-05-12 RX ADMIN — DILTIAZEM HYDROCHLORIDE 240 MG: 240 CAPSULE, COATED, EXTENDED RELEASE ORAL at 08:27

## 2020-05-12 RX ADMIN — ATENOLOL 25 MG: 25 TABLET ORAL at 08:26

## 2020-05-12 RX ADMIN — PANTOPRAZOLE SODIUM 40 MG: 40 TABLET, DELAYED RELEASE ORAL at 06:51

## 2020-05-12 RX ADMIN — GUAIFENESIN 600 MG: 600 TABLET, EXTENDED RELEASE ORAL at 08:26

## 2020-05-12 RX ADMIN — HYDROCODONE BITARTRATE AND ACETAMINOPHEN 1 TABLET: 5; 325 TABLET ORAL at 03:51

## 2020-05-12 RX ADMIN — FUROSEMIDE 20 MG: 20 TABLET ORAL at 08:27

## 2020-05-12 RX ADMIN — ENOXAPARIN SODIUM 120 MG: 120 INJECTION SUBCUTANEOUS at 08:29

## 2020-05-12 NOTE — PROGRESS NOTES
Case Management Discharge Note      Final Note: Patient has a rehab bed ready today at [facility] on the [facility] [address]. Report to be called to [phone]. She also needs 02 for transport and possibly after rehab- order for 02 has been sent to [facility] per patient request and portable tank delivered to bedside. Patient states her daughter will be able to pick her up upon discharge and will bring her clothes as well for transport/rehab - No other needs identified - tonya [phone]         Destination - Selection Complete      Service Provider Request Status Selected Services Address Phone Number Fax Number    [facility] Selected Inpatient Rehabilitation [address] [address] [phone]      Durable Medical Equipment      No service has been selected for the patient.      Dialysis/Infusion      No service has been selected for the patient.      Home Medical Care      No service has been selected for the patient.      Therapy      No service has been selected for the patient.      Community Resources      No service has been selected for the patient.             Final Discharge Disposition Code: 62 - inpatient rehab facility

## 2020-05-12 NOTE — DISCHARGE PLACEMENT REQUEST
"Please see 02 order   Discharging today around 1 PM to [facility]     Thank you!!     Dianelys Sharp [phone]       Gurmeet Aguirre (64 y.o. Female)     Date of Birth Social Security Number Address Home Phone MRN    1955  [address]   [place] [id number] [phone] [id number]    Congregation Marital Status          Orthodoxy        Admission Date Admission Type Admitting Provider Attending Provider Department, Room/Bed    4/29/20 Emergency Shannan Etienne MD Reddy, Mayuri V, MD [facility] 6A, N609/1    Discharge Date Discharge Disposition Discharge Destination                       Attending Provider:  Shannan Etienne MD    Allergies:  Contrast Dye, Doxycycline, Flexeril [Cyclobenzaprine], Keflex [Cephalexin], Kenalog [Triamcinolone Acetonide], Penicillins, Sulfa Antibiotics, Tetracyclines & Related, Levaquin [Levofloxacin]    Isolation:  Contact Air   Infection:  COVID (confirmed) (04/30/20)   Code Status:  CPR    Ht:  160 cm (63\")   Wt:  111 kg (244 lb)    Admission Cmt:  None   Principal Problem:  COVID-19 Pneumonitis [U07.1,J12.89]                 Active Insurance as of 4/29/2020     Primary Coverage     Payor Plan Insurance Group Employer/Plan Group    [place] [id number]     Payor Plan Address Payor Plan Phone Number Payor Plan Fax Number Effective Dates    [employer] Claims Office - [address] [address] [phone]  1/1/2020 - None Entered    [facility] [id number]       Subscriber Name Subscriber Birth Date Member ID       GURMEET AGUIRRE 1955 [id number]                 Emergency Contacts      (Rel.) Home Phone Work Phone Mobile Phone    TJ AISHWARYA (Son) -- -- [phone]    FEI MADHU (Daughter) -- -- [phone]        [address]  [address] [facility] [address]  Dept. Phone:  [phone]  Dept. Fax:   Date Ordered: May 12, 2020         Patient:  Grumeet Aguirre " "MRN:  [id number]    [address] [place] [address] :  1955  SSN:    Phone: [phone] Sex:  F     Weight: 111 kg (244 lb)         Ht Readings from Last 1 Encounters:   20 160 cm (63\")         Oxygen Therapy         (Order ID: [id number])    Diagnosis:  COVID-19 virus infection (U07.1 [ICD-10-CM])  Acute hypoxemic respiratory failure (CMS/HCC) (J96.01 [ICD-10-CM] 518.81 [ICD-9-CM])   Quantity:  1  Comments: Portable tank needed for transport to [facility] - please follow at [facility] - may need home 02 after dc from [facility] - Thank you!     Delivery Modality: Nasal Cannula  Liters Per Minute: 2  Duration: Continuous  Equipment:  Oxygen Concentrator &  &  Portable Gaseous Oxygen System & Portable Oxygen Contents Gaseous &  Conserving Regulator  The face to face evaluation was performed on: 2020  Which [employer] company is this being sent to? [facility] [[id number]]  Length of Need (99 Months = Lifetime): 99 Months = Lifetime        Authorizing Provider's Phone: [phone]   Authorizing Provider:Shannan Etienne MD  Authorizing Provider's NPI: [id number]  Order Entered By: Dianelys Sharp RN 2020 10:54 AM     Electronically signed by: Shannan Etienne MD 2020 10:54 AM               History & Physical      Rishi Gill MD at 20 [address] [place] [address] Medicine Services  HISTORY AND PHYSICAL    Patient Name: Marlene Aguirre  : 1955  MRN: [id number]  Primary Care Physician: Provider, No Known  Date of admission: 2020      Subjective   Subjective     Chief Complaint:  Hypoxic resp failure, recent covid-+, fever    HPI:  Marlene Aguirre is a 64 y.o. female w/ hx afib/pacemaker (follows w/ Dr. Gaytan at [facility] on xarelto), asthma, htn, obesity who reportsa positive covid-19 test obtained through her pcp last week on 20 (Dr. Leblanc @ [facility] in " [place], [place]). Patient had been exposed to covid as her son (with whom patient lives) tested positive for covid as did patient's granddaughter. Initially had no symptoms despite her + test. But developed diarrhea Sunday 2/26/20. Then developed some dry cough Monday 4/27/20 for which pcp initiated on z-pack. Patient developed worsening cough yesterday and today had fever and worsened symptoms prompting EMS to bring patient to ED for evaluation. Upon evaluation patient was hypoxic and febrile. Patient has iv contrast allergy so had cxr showing patchy infiltrates. Mildly elevated d-dimer level for which had v/q scan deemed low probability for PE.    Review of Systems   Headache last week, none recent. No abd pain. No palpitations, no chest pain  All other systems reviewed and are negative.     Personal History     Past Medical History:   Diagnosis Date   • Asthma    • Atrial fibrillation (CMS/HCC)    • Coronary artery disease    • Hypertension        Past Surgical History:   Procedure Laterality Date   • BILATERAL BREAST REDUCTION     • BRAIN SURGERY     • DILATATION AND CURETTAGE     • PACEMAKER IMPLANTATION         Family History: family history is not on file. Otherwise pertinent FHx was reviewed and unremarkable.     Social History:  reports that she has quit smoking. She has never used smokeless tobacco. She reports that she does not drink alcohol or use drugs.  Social History     Social History Narrative   • Not on file       Medications:  Available home medication information reviewed.  Medications Prior to Admission   Medication Sig Dispense Refill Last Dose   • albuterol (PROVENTIL) 2 MG tablet Take 2 mg by mouth 3 (Three) Times a Day.      • atenolol (TENORMIN) 25 MG tablet Take 25 mg by mouth Daily.      • dilTIAZem CD (CARDIZEM CD) 240 MG 24 hr capsule Take 240 mg by mouth Daily.      • spironolactone (ALDACTONE) 25 MG tablet Take 50 mg by mouth Daily.          Allergies   Allergen Reactions   • Contrast  Dye Swelling     FACE, NOSE AND NECK SWELLING AS WELL AS SANDI    • Doxycycline Provider Review Needed     PT UNSURE OF REACTION    • Flexeril [Cyclobenzaprine] Hives   • Keflex [Cephalexin] Provider Review Needed     PT DOES NOT REMEMBER    • Kenalog [Triamcinolone Acetonide] Itching   • Penicillins Hives   • Sulfa Antibiotics Itching   • Tetracyclines & Related Provider Review Needed     PT UNSURE OF REACTION   • Levaquin [Levofloxacin] Rash       Objective   Objective     Vital Signs:   Temp:  [101 °F (38.3 °C)] 101 °F (38.3 °C)  Heart Rate:  [68-79] 79  Resp:  [22] 22  BP: (114-137)/(69-97) 137/88        Physical Exam     With patient's consent, physical exam was conducted via direct visual telemedicine encounter (through window) while simultaneously talking on telephone w/ patient.This was done due to current isolation requirements in the interest of PPE conservation.      Constitutional:Alert, oriented x 3, nontoxic appearing  Psych:Normal/appropriate affect  HEENT:Ncat, oroph clear, nasal canula in place  Neck: neck supple, full range of motion  Neuro: Face symmetric, speech clear, , moves all extremities equally  Cardiac: sinus rhythm on tele  Resp: symmetric chest rise and fall, normal respiratory effort  GI: abd soft, obese, no tenderness w/ patient self palpation  Skin: No extremity rash  Musculoskeletal/extremities: no cyanosis extremities; no significant ankle edema      Results Reviewed:  I have personally reviewed current lab and radiology data.    Results from last 7 days   Lab Units 04/29/20  1511   WBC 10*3/mm3 4.97   HEMOGLOBIN g/dL 14.9   HEMATOCRIT % 47.7*   PLATELETS 10*3/mm3 200     Results from last 7 days   Lab Units 04/29/20  1511   SODIUM mmol/L 140   POTASSIUM mmol/L 3.8   CHLORIDE mmol/L 101   CO2 mmol/L 25.0   BUN mg/dL 13   CREATININE mg/dL 0.80   GLUCOSE mg/dL 123*   CALCIUM mg/dL 8.8   ALT (SGPT) U/L 7   AST (SGOT) U/L 25   TROPONIN T ng/mL <0.010   PROBNP pg/mL 170.2   LACTATE mmol/L  1.6   PROCALCITONIN ng/mL 0.09*     Estimated Creatinine Clearance: 88.2 mL/min (by C-G formula based on SCr of 0.8 mg/dL).  Brief Urine Lab Results     None        Imaging Results (Last 24 Hours)     Procedure Component Value Units Date/Time    NM Lung Scan Perfusion Particulate [[id number]] Collected:  04/29/20 1825     Updated:  04/29/20 1827    Narrative:       EXAMINATION: NM LUNG SCAN PERFUSION PARTICULATE-     INDICATION: hypoxemia in covid patient with IV dye allergy; J96.01-Acute  respiratory failure with hypoxia; U07.1-COVID-19      TECHNIQUE: Nuclear medicine perfusion scan performed with 5.5 mCi of MAA  administered     COMPARISON: NONE     FINDINGS: Heterogeneity of perfusion correlating with acute parenchymal  disease process on chest x-ray performed same day however no large  defect to suggest perfusion or abnormality.          Impression:       Heterogeneity of chronic fraying findings along with acute  airspace disease as seen on chest x-ray performed same day without  perfusion defect otherwise identified overall low probability for  perfusion abnormality. If symptoms persistent or progressive CT  angiogram chest May BE considered for further evaluation           XR Chest 1 View [[id number]] Collected:  04/29/20 1732     Updated:  04/29/20 1733    Narrative:          EXAMINATION: XR CHEST 1 VW-      INDICATION: covid pneumonia      COMPARISON: NONE     FINDINGS: Cardiomegaly with increased bony vascularity. Scattered right  mid and lower lung opacifications consistent with acute airspace  disease. No pneumothorax or pleural effusion. Degenerative changes of  the spine. Left chest wall pacing device with leads intact.           Impression:       Patchy opacifications throughout the right mid and lower  lung consistent with acute airspace disease such as bronchopneumonia. No  pneumothorax or pleural effusion.                   Assessment/Plan   Assessment & Plan     Active Hospital Problems     Diagnosis POA   • **Pneumonia due to COVID-19 virus [U07.1, J12.89] Unknown   • Acute hypoxemic respiratory failure (CMS/HCC) [J96.01] Yes     Priority: High   • Asthma [J45.909] Unknown     Priority: Medium   • Fever [R50.9] Unknown   • Paroxysmal atrial fibrillation (CMS/HCC) [I48.0] Unknown   • Chronic anticoagulation [Z79.01] Not Applicable   • History of pacemaker [Z95.0] Not Applicable   • Multiple allergies (ct scan iv contrast, pcn, keflex, quinalones, tetracyclines, kenalog) [Z88.9] Unknown     Has tolerated prednisone     • Obesity (BMI 30-39.9) [E66.9] Unknown     Plan:    -repeat covid-19 pcr in house test pending (requesting pcp records regarding reported positive covid pcr last week thursday 4/23/20). If our in house covid pcr is negative I would continue airborne precautions and repeat test +/- ct chest without contrast depending on oxygen requirements at that time    -empiric azactam, vanc, continue z-max (day #3/5 initiated as outpatient). Legionella & strep urinary antigen. Nasal mrsa pcr. I discussed w/ ID on call via phone tonight. To determine +/- plaquenil or other covid related treatments tomorro in multidisciplinary rounds.    -albuterol inhaler prn; monitor oxygen requirements, nursing staff instructed to call if requiring 6 liters supplemental oxygen to maintain sats >90% (in which case would contact intensivist regarding possible icu transfer)    -continue home cardiac meds (atenolol, diltiazem) and change xarelto (for afib) to therapeutic lovenox. Requesting Dr. Gaytan ([facility]) most recent clinic note    DVT prophylaxis:  Therapeutic lovenox (transitioned from xarelto in light of recent covid + test as outpatient)      65 minutes critical care spent on this patient. Has high potential for further clinical deterioration/worsening of respiratory failure    CODE STATUS:    Code Status and Medical Interventions:   Ordered at: 04/29/20 1943     Code Status:    CPR     Medical Interventions  (Level of Support Prior to Arrest):    Full       Admission Status:  I believe this patient meets inpatient status due to respiratory status and expected stay > 2 midnights    Electronically signed by Rishi Gill MD, 04/29/20, 7:45 PM.    Electronically signed by Rishi Gill MD at 04/29/20 2004          Physician Progress Notes (last 24 hours) (Notes from 05/11/20 1056 through 05/12/20 1056)      Braxton Horn MD at 05/11/20 1401          INFECTIOUS DISEASE CONSULT/INITIAL HOSPITAL VISIT    Marlene Aguirre  1955  [id number]    Date of Consult: 5/11/2020    Admission Date: 4/29/2020      Requesting Provider: Rishi Gill MD  Evaluating Physician: Braxton Horn MD    Reason for Consultation: COVID-19    History of present illness:    Patient is a 64 y.o. female who has intermittent asthma who has history of tobacco use and quit 3 years ago has multiple family members living with her.  Apparently patient developed headache, diarrhea, dry cough was given a Z-Jules by primary care physician on April 27,020 patient had progressive cough and fever and hypoxia on April 29.    Apparently a therapist that was coming to the house tested positive for COVID prior to patient's symptoms.    Patient admitted to the floor but because of worsening hypoxia and fevers was transferred to the intensive care unit.  Patient given 1 dose of 800 mg of Actemra last night    Patient is stable hemodynamically and on oxygen by nasal cannula currently her sats on 6 L are 91%    CT scan performed high probability for COVID-19 with bilateral groundglass opacities    COVID PCR positive    5/1/2020 patient received 2 units of convalescent serum plasma yesterday is received IV Lasix as well remains on 6 L with O2 sats between 88% and 90%.  Patient is a good spirits is sitting in chair is able to order food for nutrition per telephone call.  Per nursing staff had liquid bowel movements x3 yesterday is  otherwise afebrile and hemodynamically stable    Review of systems remark for diarrhea no fevers    5/2/2020; patient required 8 L of oxygen by nasal cannula last night is otherwise hemodynamically stable.  Patient is eating small amounts reports that she ate for sherbet yesterday with the food otherwise states like cardboard.    Patient has intermittent cough which is productive.  She denies fevers rash sore throat ;    Patient was talked to by telephone and inspected through the window.    5/3/2020; patient is doing fair he is on high flow nasal cannula now with better O2 sats but is on increasing amounts of oxygen.  Patient is afebrile and hemodynamically stable patient is being diuresed by pulmonary.  Does not report rash or sore throat patient spoke to over the telephone and visualized through the window    5/4/20; doing well; reports nose is stopped up. no events overnight; afebrile, hemodynamically stable; on high flow nasal cannula this is being weaned; denies chest pain, but does have intermittent coughing. Eating better.   Patient inspected from window and talked to by telephone    5/5/2020 patient is on less oxygen has been transitioned from high flow nasal cannula to regular nasal cannula on 6 L currently patient is resting quietly; reports congestion, abdominal cramping, diarrhea.   5/6/2020 patient is on 3 L oxygen is afebrile and hemodynamically stable to be transferred to floor good spirits no complaints today    5/7/20; doing well; no events overnight; no rash, sore throat, diarrhea; transferred to floor; requiring 3 liters o2; glad to be out of ICU; breathing is ok; still has cough, feels a little heaviness in chest, and dyspnea; feels better overall;     5/8/2020 patient is feeling fair did have some chest pain with radiation to the left scapula last night cardiology is being involved cardiac markers been obtained been negative patient denies shortness of breath denies fevers is not well rested  today no other complaints    5/11/2020 patient is doing well patient is requiring 2 L of oxygen denies fevers, rash, sore throat  Has no major complaints today is being assessed for transfer to [facility].  Past Medical History:   Diagnosis Date   • Asthma    • Atrial fibrillation (CMS/HCC)    • Coronary artery disease    • Hypertension        Past Surgical History:   Procedure Laterality Date   • BILATERAL BREAST REDUCTION     • BRAIN SURGERY     • DILATATION AND CURETTAGE     • PACEMAKER IMPLANTATION         History reviewed. No pertinent family history.    Social History     Socioeconomic History   • Marital status:      Spouse name: Not on file   • Number of children: Not on file   • Years of education: Not on file   • Highest education level: Not on file   Tobacco Use   • Smoking status: Former Smoker   • Smokeless tobacco: Never Used   • Tobacco comment: QUIT 3 YEARS AGO   Substance and Sexual Activity   • Alcohol use: Never     Frequency: Never   • Drug use: Never   • Sexual activity: Defer       Allergies   Allergen Reactions   • Contrast Dye Swelling     FACE, NOSE AND NECK SWELLING AS WELL AS SANDI    • Doxycycline Unknown - Low Severity     PT UNSURE OF REACTION    • Flexeril [Cyclobenzaprine] Hives   • Keflex [Cephalexin] Unknown - Low Severity     PT DOES NOT REMEMBER    • Kenalog [Triamcinolone Acetonide] Itching   • Penicillins Hives   • Sulfa Antibiotics Itching   • Tetracyclines & Related Unknown - Low Severity     PT UNSURE OF REACTION   • Levaquin [Levofloxacin] Rash          Medication:    Current Facility-Administered Medications:   •  acetaminophen (TYLENOL) tablet 650 mg, 650 mg, Oral, Q6H PRN, Case, Conchita V., DO, 650 mg at 05/10/20 1544  •  albuterol sulfate HFA (PROVENTIL HFA;VENTOLIN HFA;PROAIR HFA) inhaler 2 puff, 2 puff, Inhalation, Q4H PRN, Case, Conchita V., DO, 2 puff at 04/30/20 1612  •  atenolol (TENORMIN) tablet 25 mg, 25 mg, Oral, Daily, Case, Conchita V., DO, 25 mg at  05/11/20 0758  •  calcium carbonate EX (TUMS EX) chewable tablet 1,500 mg, 1,500 mg, Oral, TID PRN, Rishi Gill MD, 1,500 mg at 05/08/20 1157  •  camphor-menthol (SARNA) 0.5-0.5 % lotion, , Topical, PRN, Shannan Etienne MD  •  dilTIAZem CD (CARDIZEM CD) 24 hr capsule 240 mg, 240 mg, Oral, Daily, Case, Conchita V., DO, 240 mg at 05/11/20 0757  •  diphenhydrAMINE (BENADRYL) capsule 50 mg, 50 mg, Oral, BID PRN, Case, Conchita V., DO, 50 mg at 05/10/20 1323  •  enoxaparin (LOVENOX) syringe 120 mg, 1 mg/kg, Subcutaneous, Q12H, Case, Conchita V., DO, 120 mg at 05/11/20 0758  •  furosemide (LASIX) tablet 20 mg, 20 mg, Oral, Daily, Rishi Gill MD, 20 mg at 05/11/20 0757  •  guaiFENesin (MUCINEX) 12 hr tablet 600 mg, 600 mg, Oral, Q12H, Shannan Etienne MD, 600 mg at 05/11/20 0757  •  HYDROcodone-acetaminophen (NORCO) 5-325 MG per tablet 1 tablet, 1 tablet, Oral, Q8H PRN, Shannan Etienne MD, 1 tablet at 05/11/20 0601  •  magnesium sulfate 4 gram infusion - Mg less than or equal to 1mg/dL, 4 g, Intravenous, PRN **OR** magnesium sulfate 3 gram infusion (1gm x 3) - Mg 1.1 - 1.5 mg/dL, 1 g, Intravenous, PRN **OR** Magnesium Sulfate 2 gram infusion- Mg 1.6 - 1.9 mg/dL, 2 g, Intravenous, PRN, Case, Conchita V., DO, Last Rate: 25 mL/hr at 05/10/20 0950, 2 g at 05/10/20 0950  •  pantoprazole (PROTONIX) EC tablet 40 mg, 40 mg, Oral, Q AM, Rishi Gill MD, 40 mg at 05/11/20 0601  •  potassium chloride (MICRO-K) CR capsule 40 mEq, 40 mEq, Oral, PRN, Case, Conchita V., DO, 40 mEq at 04/30/20 1221  •  pravastatin (PRAVACHOL) tablet 40 mg, 40 mg, Oral, Nightly, Case, Conchita V., DO, 40 mg at 05/10/20 2001  •  sodium chloride 0.9 % flush 10 mL, 10 mL, Intravenous, PRN, Case, Conchita V., DO  •  sodium chloride 0.9 % flush 10 mL, 10 mL, Intravenous, Q12H, Case, Conchita V., DO, 10 mL at 05/11/20 0803  •  sodium chloride 0.9 % flush 10 mL, 10 mL, Intravenous, PRN, Case, Conchita V., DO, 10 mL at 05/01/20 0001  •  sodium  chloride nasal spray 2 spray, 2 spray, Each Nare, PRN, Case, Conchita V., DO, 2 spray at 20 2016    Antibiotics:  Anti-Infectives (From admission, onward)    Ordered     Dose/Rate Route Frequency Start Stop    20  vancomycin 2250 mg/500 mL 0.9% NS IVPB ([facility])     Ordering Provider:  Sondra Worrell RPH    20 mg/kg × 118 kg  over 150 Minutes Intravenous Once 20  aztreonam (AZACTAM) 1 g in 50 mL NS IVPB     Ordering Provider:  Sondra Worrell RPH    1 g  over 30 Minutes Intravenous Once 20 0200            Review of Systems:  See HPI      Physical Exam:   Vital Signs  Temp (24hrs), Av.1 °F (36.7 °C), Min:97.8 °F (36.6 °C), Max:98.8 °F (37.1 °C)    Temp  Min: 97.8 °F (36.6 °C)  Max: 98.8 °F (37.1 °C)  BP  Min: 107/78  Max: 151/66  Pulse  Min: 64  Max: 88  Resp  Min: 18  Max: 18  SpO2  Min: 94 %  Max: 95 %  Patient seen through window and talked to verbally  GENERAL: Resting quietly in bed is on  oxygen by nasal cannula is in no distress  HEENT: Normocephalic, atraumatic.  EOMI no external oral lesions    HEART: Sinus rhythm by telemetry  LUNGS: no respiratory distress  ABDOMEN: Bowel sounds were present.  No obvious abdominal distention on exam        NEURO: Oriented to PPT.  Able to have an appropriate conversation  PSYCHIATRIC:  Normal affect    Laboratory Data    Results from last 7 days   Lab Units 20  0424 20  0556   WBC 10*3/mm3 6.21 6.24   HEMOGLOBIN g/dL 12.9 13.9   HEMATOCRIT % 39.6 41.1   PLATELETS 10*3/mm3 311 283     Results from last 7 days   Lab Units 20  0542   SODIUM mmol/L 139   POTASSIUM mmol/L 4.3   CHLORIDE mmol/L 99   CO2 mmol/L 28.0   BUN mg/dL 15   CREATININE mg/dL 0.63   GLUCOSE mg/dL 101*   CALCIUM mg/dL 8.6     Results from last 7 days   Lab Units 20  0641   ALK PHOS U/L 43   BILIRUBIN mg/dL 0.2   ALT (SGPT) U/L 16   AST (SGOT) U/L 23                         Estimated Creatinine Clearance: 108  mL/min (by C-G formula based on SCr of 0.63 mg/dL).      Microbiology:  No results found for: ACANTHNAEG, AFBCX, BPERTUSSISCX, BLOODCX  No results found for: BCIDPCR, CXREFLEX, CSFCX, CULTURETIS  No results found for: CULTURES, HSVCX, URCX  No results found for: EYECULTURE, GCCX, LABHSV  No results found for: LEGIONELLA, MRSACX, MUMPSCX, MYCOPLASCX  No results found for: NOCARDIACX, STOOLCX  No results found for: THROATCX, UNSTIMCULT, URINECX, CULTURE, VZVCULTUR  No results found for: VIRALCULTU, WOUNDCX        Radiology:  Imaging Results (Last 72 Hours)     ** No results found for the last 72 hours. **            Impression:   CO VID 19 pneumonitis  Hypoxia  Acute respiratory failure improving  Probable cytokine storm manifested by elevated ferritin elevated LDH, hypoxia  Penicillin allergy; questionable throat swelling  Doxycycline intolerance     Patient has received Actemra on   Received 2 units of serum convalescent plasma therapy on     PLAN/RECOMMENDATIONS:   Thank you for asking us to see Marlene Aguirre, I recommend the following:      Of note COVID-19 can have complications including myocarditis and probably pericarditis.    Patient is going to be eval by cardiology because of chest pain.    Given the patient's age and obesity patient is probably at risk for some sort of coronary artery disease with angina  Agree with Lovenox for DVT prophylaxis  Continue pravastatin for cardioprotection        Cytokine storm appears to be resolving    Patient is on oxygen by nasal cannula and improving    Okay from my standpoint to go to [facility]    D/w with hospitalist, [facility] office  Dp/cm 15 minutes  Braxton Horn MD  2020  14:01                    Electronically signed by Braxton Horn MD at 20 1405     Shannan Etienne MD at 20 1145              [facility] Medicine Services  PROGRESS NOTE    Patient Name: Marlene Aguirre  : 1955  MRN:  [id number]    Date of Admission: 4/29/2020  Primary Care Physician: Provider, No Known    Subjective   Subjective     CC:  F/U COVID-19 infection    HPI:  Patient seen this morning. Complains of some sciatic nerve pain, asking for some Biofreeze. Otherwise feels well.    Patient interviewed via telephone with direct visualization of patient through the window.    Review of Systems  Gen-no fevers, no chills  CV-no chest pain, no palpitations  Resp-+mild cough, no dyspnea  GI-no N/V/D, no abd pain    All other systems reviewed and negative except any additional pertinent positives and negatives as discussed in HPI.      Objective   Objective     Vital Signs:   Temp:  [97.8 °F (36.6 °C)-98.8 °F (37.1 °C)] 97.9 °F (36.6 °C)  Heart Rate:  [64-88] 77  Resp:  [18] 18  BP: (107-151)/(66-86) 151/66        Physical Exam:  With patient's consent, physical exam was conducted via visual telemedicine encounter with bedside portion accommodated by nursing staff due to patient's current isolation requirements in the interest of PPE conservation.    Constitutional: No acute distress, awake, alert, nontoxic, normal body habitus, sitting up in chair beside bed  Respiratory: good effort, nonlabored respirations on 2 L  Cardiovascular:  tele with NSR  Musculoskeletal: No edema, normal muscle tone and mass for age  GI: Soft, no grimace, nondistended  Psychiatric: Appropriate affect, good insight and judgement, cooperative  Neurologic: Oriented x 3, movements symmetric BUE and BLE, speech clear and fluent      Results Reviewed:  Results from last 7 days   Lab Units 05/08/20  0424 05/07/20  0556   WBC 10*3/mm3 6.21 6.24   HEMOGLOBIN g/dL 12.9 13.9   HEMATOCRIT % 39.6 41.1   PLATELETS 10*3/mm3 311 283     Results from last 7 days   Lab Units 05/09/20  0542 05/08/20  1043 05/08/20  0641 05/07/20  0556   SODIUM mmol/L 139  --  140 135*   POTASSIUM mmol/L 4.3  --  4.3 4.2   CHLORIDE mmol/L 99  --  100 98   CO2 mmol/L 28.0  --  27.0 26.0   BUN  mg/dL 15  --  15 16   CREATININE mg/dL 0.63  --  0.65 0.62   GLUCOSE mg/dL 101*  --  116* 97   CALCIUM mg/dL 8.6  --  8.4* 8.7   ALT (SGPT) U/L  --   --  16  --    AST (SGOT) U/L  --   --  23  --    TROPONIN T ng/mL  --  <0.010 <0.010  --    PROBNP pg/mL  --   --  84.0  --      Estimated Creatinine Clearance: 108 mL/min (by C-G formula based on SCr of 0.63 mg/dL).    Microbiology Results Abnormal     Procedure Component Value - Date/Time    CORONAVIRUS (COVID-19),RT-PCR,[email or web address], NP SWAB IN [facility] - Swab, Nasopharynx [[id number]]  (Abnormal) Collected:  05/07/20 1425    Lab Status:  Final result Specimen:  Swab from Nasopharynx Updated:  05/08/20 1636     Reference Lab Report --     Comment: See scanned report          COVID19 Detected    Blood Culture - Blood, Arm, Right [[id number]] Collected:  04/29/20 1600    Lab Status:  Final result Specimen:  Blood from Arm, Right Updated:  05/04/20 1615     Blood Culture No growth at 5 days    Blood Culture - Blood, Arm, Right [[id number]] Collected:  04/29/20 1550    Lab Status:  Final result Specimen:  Blood from Arm, Right Updated:  05/04/20 1615     Blood Culture No growth at 5 days    Legionella Antigen, Urine - Urine, Urine, Clean Catch [[id number]]  (Normal) Collected:  04/30/20 0911    Lab Status:  Final result Specimen:  Urine, Clean Catch Updated:  04/30/20 1302     LEGIONELLA ANTIGEN, URINE Negative    S. Pneumo Ag Urine or CSF - Urine, Urine, Clean Catch [[id number]]  (Normal) Collected:  04/30/20 0911    Lab Status:  Final result Specimen:  Urine, Clean Catch Updated:  04/30/20 1302     Strep Pneumo Ag Negative    MRSA Screen, PCR (Inpatient) - Swab, Nares [[id number]]  (Normal) Collected:  04/29/20 2157    Lab Status:  Final result Specimen:  Swab from Nares Updated:  04/30/20 0037     MRSA PCR Negative    Narrative:       MRSA Negative    SARS-CoV-2 PCR ([facility] PERFORMED), NP SWAB IN [facility]SPORT MEDIA - Swab, Nasopharynx [[id number]]   (Abnormal) Collected:  04/29/20 1644    Lab Status:  Final result Specimen:  Swab from Nasopharynx Updated:  04/30/20 0003     COVID19 Detected    Respiratory Panel, PCR - Swab, Nasopharynx [[id number]]  (Normal) Collected:  04/29/20 1644    Lab Status:  Final result Specimen:  Swab from Nasopharynx Updated:  04/29/20 1802     ADENOVIRUS, PCR Not Detected     Coronavirus 229E Not Detected     Coronavirus HKU1 Not Detected     Coronavirus NL63 Not Detected     Coronavirus OC43 Not Detected     Human Metapneumovirus Not Detected     Human Rhinovirus/Enterovirus Not Detected     Influenza B PCR Not Detected     Parainfluenza Virus 1 Not Detected     Parainfluenza Virus 2 Not Detected     Parainfluenza Virus 3 Not Detected     Parainfluenza Virus 4 Not Detected     Bordetella pertussis pcr Not Detected     Influenza A H1 2009 PCR Not Detected     Chlamydophila pneumoniae PCR Not Detected     Mycoplasma pneumo by PCR Not Detected     Influenza A PCR Not Detected     Influenza A H3 Not Detected     Influenza A H1 Not Detected     RSV, PCR Not Detected     Bordetella parapertussis PCR Not Detected    Narrative:       The coronavirus on the RVP is NOT COVID-19 and is NOT indicative of infection with COVID-19.           Imaging Results (Last 24 Hours)     ** No results found for the last 24 hours. **          Results for orders placed during the hospital encounter of 04/29/20   Adult Transthoracic Echo Complete W/ Cont if Necessary Per Protocol    Narrative · Calculated EF = 59.0%.  · Left ventricular wall thickness is consistent with mild concentric   hypertrophy.  · Left ventricular systolic function is normal.  · Left ventricular diastolic dysfunction (grade I) consistent with   impaired relaxation.  · No significant structural or functional valvular disease.          I have reviewed the medications:  Scheduled Meds:    atenolol 25 mg Oral Daily   dilTIAZem  mg Oral Daily   enoxaparin 1 mg/kg Subcutaneous Q12H      furosemide 20 mg Oral Daily   guaiFENesin 600 mg Oral Q12H   pantoprazole 40 mg Oral Q AM   pravastatin 40 mg Oral Nightly   sodium chloride 10 mL Intravenous Q12H     Continuous Infusions:   PRN Meds:.•  acetaminophen  •  albuterol sulfate HFA  •  calcium carbonate EX  •  camphor-menthol  •  diphenhydrAMINE  •  HYDROcodone-acetaminophen  •  magnesium sulfate **OR** magnesium sulfate in D5W 1g/100mL (PREMIX) **OR** magnesium sulfate  •  potassium chloride  •  sodium chloride  •  sodium chloride  •  sodium chloride    Assessment/Plan   Assessment & Plan     Active Hospital Problems    Diagnosis  POA   • **COVID-19 Pneumonitis [U07.1, J12.89]  Yes   • Acute hypoxemic respiratory failure (CMS/HCC) [J96.01]  Yes   • Fever [R50.9]  Yes   • Paroxysmal atrial fibrillation (CMS/HCC) [I48.0]  Yes   • Obesity (BMI 30-39.9) [E66.9]  Yes      Resolved Hospital Problems   No resolved problems to display.        Brief Hospital Course to date:  Marlene Aguirre is a 64 y.o. female with hx of Afib on Xarelto, pacemaker, asthma, and obesity who presents to the ER on 4/29/20 due to progressive cough, dyspnea, and fever despite being placed on Zithromax as an outpatient. She was hypoxic and febrile on arrival. CXR showed patchy airspace disease in the right mid and lower lung, and D-dimer was mildly elevated. V/Q scan was low probability for PE. Subsequent CT chest without contrast showed extensive multifocal regions of moderately dense, patchy ground glass opacities throughout both lungs, typical for COVID-19. Her COVID-19 PCR returned POSITIVE. In the hours following hospital admission, patient had worsening O2 requirements (up to 6 liters) and was transferred to the ICU. She received a dose of Actemra to prevent cytokine storm as well as convalescent plasma per Infectious Disease recommendations. Patient required up to 80% high flow nasal cannula but improved down to 3 liters and was transferred to the floor with hospitalist  service resuming care on 5/7. In the early morning hours on 5/8, she had chest pressure which resolved after nitroglycerin as well as gas pains and belching which resolved with Tums. EKG and troponins were negative and Echo was unremarkable aside from diastolic dysfunction.    Of note, patient's sister is currently hospitalized with COVID-19 as well. Patient reports her son as well as granddaughter also have tested positive. It seems there was a physical therapist who had come to work with patient's son during home PT who had tested positive, so likely the initial source of transmission.    COVID-19 pneumonitis  Acute hypoxic respiratory failure  --s/p Actemra (4/29) and convalescent plasma (4/30).  --ID following. Inflammatory markers trending down.  --Now on 2 liters. Unable to wean off completely so will need oxygen at discharge. Encouraged IS.  --Mobilize with PT/OT.    Transient atypical chest pain 5/8/20  --EKG and troponins unremarkable. Echo 5/8 unremarkable aside from diastolic dysfunction.   --Now chest pain free, likely GI related (improved with Tums). Continue Protonix and Tums.    Hx of PAF  Hx of pacemaker  --Previously on Xarelto, will resume at discharge.  --Continue Diltiazem and Atenolol.    Headache, persistent  --Tylenol not helping much.  --Says she is allergic to Excedrin.  --Improved today    Sciatic nerve pain  --Continue PRN Norco.   --Add Biofreeze.    DVT Prophylaxis:  Lovenox    (My partner Dr. Gill previously spoke with patient's PCP Dr. Ammy Leblanc (cell [phone]) on 5/7/20 with update on patient's condition. She wishes to be called when patient is being discharged home from rehab so she can coordinate close outpatient follow up. Will need to make that clear in discharge instructions/summary.)    Patient declined family phone call today. No new updates. Waiting for [facility] approval.     Disposition: I expect the patient to be discharged to [facility] vs home anytime    CODE STATUS:   Code  Status and Medical Interventions:   Ordered at: 04/29/20 1943     Code Status:    CPR     Medical Interventions (Level of Support Prior to Arrest):    Full         Electronically signed by Shannan Etienne MD, 05/11/20, 11:49.        Electronically signed by Shannan Etienne MD at 05/11/20 1149

## 2020-05-12 NOTE — PLAN OF CARE
Problem: Patient Care Overview  Goal: Plan of Care Review  Outcome: Ongoing (interventions implemented as appropriate)  Flowsheets (Taken 5/12/2020 1130)  Outcome Summary: VSS on 2L NC; pt go oxygen for discharge; anticipating discharge, case management coordinating with daughter about discharge to [facility]; pt c/o headache given tylenol; no other complaints at this time will continue to monitor  Goal: Individualization and Mutuality  Outcome: Ongoing (interventions implemented as appropriate)  Goal: Discharge Needs Assessment  Outcome: Ongoing (interventions implemented as appropriate)  Goal: Interprofessional Rounds/Family Conf  Outcome: Ongoing (interventions implemented as appropriate)     Problem: Fall Risk (Adult)  Goal: Absence of Fall  Outcome: Ongoing (interventions implemented as appropriate)     Problem: Pneumonia (Adult)  Goal: Signs and Symptoms of Listed Potential Problems Will be Absent, Minimized or Managed (Pneumonia)  Outcome: Ongoing (interventions implemented as appropriate)     Problem: ARDS (Acute Resp Distress Syndrome) (Adult)  Goal: Signs and Symptoms of Listed Potential Problems Will be Absent, Minimized or Managed (ARDS)  Outcome: Ongoing (interventions implemented as appropriate)     Problem: Breathing Pattern Ineffective (Adult)  Goal: Effective Oxygenation/Ventilation  Outcome: Ongoing (interventions implemented as appropriate)  Goal: Anxiety/Fear Reduction  Outcome: Ongoing (interventions implemented as appropriate)     Problem: Skin Injury Risk (Adult)  Goal: Skin Health and Integrity  Outcome: Ongoing (interventions implemented as appropriate)

## 2020-05-12 NOTE — DISCHARGE SUMMARY
[facility] Medicine Services  DISCHARGE SUMMARY    Patient Name: Marlene Aguirre  : 1955  MRN: [id number]    Date of Admission: 2020  2:54 PM  Date of Discharge:  20  Primary Care Physician: Provider, No Known    Consults     Date and Time Order Name Status Description    2020 0440 Inpatient Cardiology Consult            Hospital Course     Presenting Problem:   Acute hypoxemic respiratory failure (CMS/HCC) [J96.01]    Active Hospital Problems    Diagnosis  POA   • **COVID-19 Pneumonitis [U07.1, J12.89]  Yes   • Acute hypoxemic respiratory failure (CMS/HCC) [J96.01]  Yes   • Paroxysmal atrial fibrillation (CMS/HCC) [I48.0]  Yes   • Obesity (BMI 30-39.9) [E66.9]  Yes      Resolved Hospital Problems    Diagnosis Date Resolved POA   • Fever [R50.9] 2020 Yes          Hospital Course:  Marlene Aguirre is a 64 y.o. female with hx of Afib on Xarelto, pacemaker, asthma, and obesity who presents to the ER on 20 due to progressive cough, dyspnea, and fever despite being placed on Zithromax as an outpatient. She was hypoxic and febrile on arrival. CXR showed patchy airspace disease in the right mid and lower lung, and D-dimer was mildly elevated. V/Q scan was low probability for PE. Subsequent CT chest without contrast showed extensive multifocal regions of moderately dense, patchy ground glass opacities throughout both lungs, typical for COVID-19. Her COVID-19 PCR returned POSITIVE. In the hours following hospital admission, patient had worsening O2 requirements (up to 6 liters) and was transferred to the ICU. She received a dose of Actemra to prevent cytokine storm as well as convalescent plasma per Infectious Disease recommendations. Patient required up to 80% high flow nasal cannula but improved down to 3 liters and was transferred to the floor with hospitalist service resuming care on . In the early morning hours on , she had chest pressure which  resolved after nitroglycerin as well as gas pains and belching which resolved with Tums. EKG and troponins were negative and Echo was unremarkable aside from diastolic dysfunction.     Of note, patient's sister was also hospitalized here at [facility] with COVID-19 as well. Patient reports her son as well as granddaughter also have tested positive. It seems there was a physical therapist who had come to work with patient's son during home PT who had tested positive, so likely the initial source of transmission.    COVID-19 pneumonitis  Acute hypoxic respiratory failure  --s/p Actemra (4/29) and convalescent plasma (4/30).  --ID has followed the patient. Inflammatory markers have been trending down.  --Stable on 2 liters. Unable to wean off completely so will need oxygen at discharge. Encourage incentive spirometry.  --Mucinex added with improvement, was able to cough up a good amount of mucus this morning.      Transient atypical chest pain 5/8/20  --EKG and troponins unremarkable. Echo 5/8 unremarkable aside from diastolic dysfunction.   --Now chest pain free, likely GI related (improved with Tums). Continue Protonix and PRN Tums.     Hx of PAF  Hx of pacemaker  --Previously on Xarelto, will resume at discharge.  --Continue Diltiazem and Atenolol.     Sciatic nerve pain  --Continue PRN Norco.   --Can use Biofreeze or IcyHot as needed.    **My partner Dr. Gill previously spoke with patient's PCP Dr. Ammy Leblanc (cell phone [phone]) on 5/7/20 with update on patient's condition. She wishes to be called when patient is being discharged home from rehab so she can coordinate close outpatient follow up within 1 week. Please ensure this is done at [facility] prior to discharge home!    Patient will be discharged to [facility] today for rehab. The MD at [facility] requested another COVID-19 test to be sent, this was collected yesterday 5/10/20 and sent to , still pending at time of discharge and [facility] can follow up on this test  result. Will likely still be positive.     Discharge Follow Up Recommendations for outpatient labs/diagnostics:  F/U with PCP in 1 week after discharge from rehab    Day of Discharge     HPI:   Patient seen this morning. Feels much better after coughing up a lot of mucus. Mucinex has helped a lot.    Review of Systems  Gen-no fevers, no chills  CV-no chest pain, no palpitations  Resp-+cough overall improved, no dyspnea  GI-no N/V/D, no abd pain    All other systems reviewed and negative except any additional pertinent positives and negatives as discussed in HPI.      Vital Signs:   Temp:  [97 °F (36.1 °C)-99 °F (37.2 °C)] 97.6 °F (36.4 °C)  Heart Rate:  [59-83] 66  Resp:  [17-18] 18  BP: (103-138)/(71-89) 120/81     Physical Exam:  With patient's consent, physical exam was conducted via visual telemedicine encounter with bedside portion accommodated by nursing staff due to patient's current isolation requirements in the interest of PPE conservation.    Constitutional: No acute distress, awake, alert, nontoxic, normal body habitus, sitting up in bed today  Respiratory: good effort, nonlabored respirations on 2 L  Cardiovascular:  tele with NSR  Musculoskeletal: No edema, normal muscle tone and mass for age  GI: Soft, no grimace, nondistended  Psychiatric: Appropriate affect, good insight and judgement, cooperative  Neurologic: Oriented x 3, movements symmetric BUE and BLE, speech clear and fluent      Pertinent  and/or Most Recent Results     Results from last 7 days   Lab Units 05/09/20  0542 05/08/20  0641 05/08/20  0424 05/07/20  0556   WBC 10*3/mm3  --   --  6.21 6.24   HEMOGLOBIN g/dL  --   --  12.9 13.9   HEMATOCRIT %  --   --  39.6 41.1   PLATELETS 10*3/mm3  --   --  311 283   SODIUM mmol/L 139 140  --  135*   POTASSIUM mmol/L 4.3 4.3  --  4.2   CHLORIDE mmol/L 99 100  --  98   CO2 mmol/L 28.0 27.0  --  26.0   BUN mg/dL 15 15  --  16   CREATININE mg/dL 0.63 0.65  --  0.62   GLUCOSE mg/dL 101* 116*  --  97    CALCIUM mg/dL 8.6 8.4*  --  8.7     Results from last 7 days   Lab Units 05/08/20  0641   BILIRUBIN mg/dL 0.2   ALK PHOS U/L 43   ALT (SGPT) U/L 16   AST (SGOT) U/L 23           Invalid input(s): TG, LDLCALC, LDLREALC  Results from last 7 days   Lab Units 05/08/20  1043 05/08/20  0641   PROBNP pg/mL  --  84.0   TROPONIN T ng/mL <0.010 <0.010       Brief Urine Lab Results     None          Microbiology Results Abnormal     Procedure Component Value - Date/Time    CORONAVIRUS (COVID-19),RT-PCR,[facility], NP SWAB IN [facility] MEDIA - Swab, Nasopharynx [[id number]]  (Abnormal) Collected:  05/07/20 1425    Lab Status:  Final result Specimen:  Swab from Nasopharynx Updated:  05/08/20 1636     Reference Lab Report --     Comment: See scanned report          COVID19 Detected    Blood Culture - Blood, Arm, Right [[id number]] Collected:  04/29/20 1600    Lab Status:  Final result Specimen:  Blood from Arm, Right Updated:  05/04/20 1615     Blood Culture No growth at 5 days    Blood Culture - Blood, Arm, Right [[id number]] Collected:  04/29/20 1550    Lab Status:  Final result Specimen:  Blood from Arm, Right Updated:  05/04/20 1615     Blood Culture No growth at 5 days    Legionella Antigen, Urine - Urine, Urine, Clean Catch [[id number]]  (Normal) Collected:  04/30/20 0911    Lab Status:  Final result Specimen:  Urine, Clean Catch Updated:  04/30/20 1302     LEGIONELLA ANTIGEN, URINE Negative    S. Pneumo Ag Urine or CSF - Urine, Urine, Clean Catch [[id number]]  (Normal) Collected:  04/30/20 0911    Lab Status:  Final result Specimen:  Urine, Clean Catch Updated:  04/30/20 1302     Strep Pneumo Ag Negative    MRSA Screen, PCR (Inpatient) - Swab, Nares [[id number]]  (Normal) Collected:  04/29/20 2157    Lab Status:  Final result Specimen:  Swab from Nares Updated:  04/30/20 0037     MRSA PCR Negative    Narrative:       MRSA Negative    SARS-CoV-2 PCR ([place] IN-HOUSE PERFORMED), NP SWAB IN TRANSPORT MEDIA - Swab,  Nasopharynx [[id number]]  (Abnormal) Collected:  04/29/20 1644    Lab Status:  Final result Specimen:  Swab from Nasopharynx Updated:  04/30/20 0003     COVID19 Detected    Respiratory Panel, PCR - Swab, Nasopharynx [[id number]]  (Normal) Collected:  04/29/20 1644    Lab Status:  Final result Specimen:  Swab from Nasopharynx Updated:  04/29/20 1802     ADENOVIRUS, PCR Not Detected     Coronavirus 229E Not Detected     Coronavirus HKU1 Not Detected     Coronavirus NL63 Not Detected     Coronavirus OC43 Not Detected     Human Metapneumovirus Not Detected     Human Rhinovirus/Enterovirus Not Detected     Influenza B PCR Not Detected     Parainfluenza Virus 1 Not Detected     Parainfluenza Virus 2 Not Detected     Parainfluenza Virus 3 Not Detected     Parainfluenza Virus 4 Not Detected     Bordetella pertussis pcr Not Detected     Influenza A H1 2009 PCR Not Detected     Chlamydophila pneumoniae PCR Not Detected     Mycoplasma pneumo by PCR Not Detected     Influenza A PCR Not Detected     Influenza A H3 Not Detected     Influenza A H1 Not Detected     RSV, PCR Not Detected     Bordetella parapertussis PCR Not Detected    Narrative:       The coronavirus on the RVP is NOT COVID-19 and is NOT indicative of infection with COVID-19.           Imaging Results (All)     Procedure Component Value Units Date/Time    XR Chest 1 View [[id number]] Collected:  05/08/20 0708     Updated:  05/08/20 0710    Narrative:       CR Chest 1 Vw    INDICATION:   Acute respiratory failure with hypoxia and chest pain. COVID 19     COMPARISON:    5/4/2020    FINDINGS:  Single portable AP view(s) of the chest.    Pacing device overlying the left aspect.    The heart size is mildly enlarged. Mediastinal structures are midline.    Redemonstrated are bilateral patchy infiltrates, right side greater than left. No obvious pleural fluid. No pneumothorax.       Impression:       1.  Mild cardiomegaly.    2.  Patchy bilateral airspace disease, right side  greater than left. Slight interval worsening when compared to prior exam.    Signer Name: Jonnathan Fleming MD   Signed: 5/8/2020 7:08 AM   Workstation Name: [id number]    [facility]    XR Chest 1 View [[id number]] Collected:  05/04/20 0818     Updated:  05/04/20 0952    Narrative:       EXAMINATION: XR CHEST 1 VW- 05/04/2020     INDICATION: J96.01-Acute respiratory failure with hypoxia;  U07.1-COVID-19      COMPARISON: 05/01/2020     FINDINGS: Portable chest reveals patchy airspace disease seen within the  right upper lobe which is stable and unchanged. The heart is enlarged.  Increased markings seen within left mid and lower lung field.  Degenerative changes seen within the spine. Cardiac pacer leads in  satisfactory position.           Impression:       Some improvement seen in aeration of the right upper and  left lower lung field. The heart is enlarged. Pacer leads remain in  satisfactory position.     D:  05/04/2020  E:  05/04/2020     This report was finalized on 5/4/2020 9:49 AM by Dr. Suzy Muhammad MD.       XR Chest 1 View [[id number]] Collected:  05/01/20 0842     Updated:  05/01/20 2244    Narrative:       EXAMINATION: XR CHEST 1 VW-     INDICATION: Resp Distress; J96.01-Acute respiratory failure with  hypoxia; U07.1-COVID-19     COMPARISON: 04/29/2020     FINDINGS: Patchy disease in the right upper lung has moderately  increased. There is mildly increased interstitial change in the left  perihilar region and right base. No dense lung consolidation effusion or  pneumothorax is seen. The heart is mildly enlarged. Vasculature is  slightly cephalized.          Impression:       Increased patchy bilateral interstitial disease, greater in  the right upper lung than elsewhere.         This report was finalized on 5/1/2020 10:41 PM by Dr. Jose Martell MD.       NM Lung Scan Perfusion Particulate [[id number]] Collected:  04/29/20 1825     Updated:  04/30/20 1642    Narrative:        EXAMINATION: NM LUNG SCAN, PERFUSION PARTICULATE-04/29/2020:     INDICATION: Hypoxemia in COVID patient with IV dye allergy; J96.01-Acute  respiratory failure with hypoxia; U07.1-COVID-19.      TECHNIQUE: Nuclear medicine perfusion scan performed with 5.5 mCi of MAA  administered.     COMPARISON: NONE.     FINDINGS: Heterogeneity of perfusion correlating with acute parenchymal  disease process on chest x-ray performed same day, however, no large  defect to suggest perfusional abnormality.       Impression:       Heterogeneity of chronic findings along with acute airspace  disease as seen on chest x-ray performed same day without perfusional  defect otherwise identified, overall low probability for perfusional  abnormality. If symptoms persistent and/or progress, CT angiogram of the  chest may be considered for further evaluation.     D:  04/29/2020  E:  04/30/2020            This report was finalized on 4/30/2020 4:38 PM by Dr. Homer Zhu.       XR Chest 1 View [[id number]] Collected:  04/29/20 1732     Updated:  04/30/20 1641    Narrative:       EXAMINATION: XR CHEST 1 VW-04/29/2020:      INDICATION: COVID pneumonia.      COMPARISON: NONE.     FINDINGS: Cardiomegaly with increased pulmonary vascularity. Scattered  right mid and lower lung opacifications consistent with acute airspace  disease. No pneumothorax or pleural effusion. Degenerative changes of  the spine. Left chest wall pacing device with leads intact.           Impression:       Patchy opacifications throughout the right mid and lower  lung consistent with acute airspace disease such as bronchopneumonia. No  pneumothorax or pleural effusion.     D:  04/29/2020  E:  04/30/2020     This report was finalized on 4/30/2020 4:38 PM by Dr. Homer Zhu.       CT Chest Without Contrast [[id number]] Collected:  04/30/20 0242     Updated:  04/30/20 0244    Narrative:       CT CHEST, NONCONTRAST, 4/30/2020    HISTORY:  64-year-old female hospital inpatient with  acute respiratory failure with hypoxia. Positive testing for COVID-19 about one week prior.    TECHNIQUE:  CT imaging of the chest without IV contrast. Radiation dose reduction techniques included automated exposure control. Radiation audit for CT and nuclear cardiology exams in the last 12 months: 0.    FINDINGS:  Extensive multifocal regions of patchy groundglass opacity are scattered throughout the both lungs in association with thickening of the interlobular and intralobular septa in these regions. These are commonly reported imaging features of COVID-19  pneumonia.    There is no dense airspace consolidation or pleural effusion. Trachea and central bronchi are normal in caliber and widely patent. No thoracic adenopathy.    Mild cardiomegaly, but no pericardial effusion. Normal caliber thoracic aorta. Cardiac pacemaker. Limited upper abdominal images showing cholelithiasis.      Impression:       1.  Extensive multifocal regions of moderately dense, patchy groundglass pulmonary opacity throughout both lungs.  2.  COVID-19 reporting category: Typical. Commonly reported imaging features of COVID-19 pneumonia are present. Viral testing reportedly confirms the diagnosis.    Signer Name: Hesham Veliz MD   Signed: 4/30/2020 2:42 AM   Workstation Name: [id number]    [facility]                    Results for orders placed during the hospital encounter of 04/29/20   Adult Transthoracic Echo Complete W/ Cont if Necessary Per Protocol    Narrative · Calculated EF = 59.0%.  · Left ventricular wall thickness is consistent with mild concentric   hypertrophy.  · Left ventricular systolic function is normal.  · Left ventricular diastolic dysfunction (grade I) consistent with   impaired relaxation.  · No significant structural or functional valvular disease.           Order Current Status    CORONAVIRUS (COVID-19),RT-PCR,  HOSPITAL, NP SWAB IN TRANSPORT MEDIA - Swab, Nasopharynx In process         Discharge Details        Discharge Medications      New Medications      Instructions Start Date   furosemide 20 MG tablet  Commonly known as:  LASIX   20 mg, Oral, Daily   Start Date:  May 13, 2020     guaiFENesin 600 MG 12 hr tablet  Commonly known as:  MUCINEX   600 mg, Oral, Every 12 Hours Scheduled      HYDROcodone-acetaminophen 5-325 MG per tablet  Commonly known as:  NORCO   1 tablet, Oral, Every 8 Hours PRN      pravastatin 40 MG tablet  Commonly known as:  PRAVACHOL   40 mg, Oral, Nightly         Changes to Medications      Instructions Start Date   albuterol 2 MG tablet  Commonly known as:  PROVENTIL  What changed:  Another medication with the same name was added. Make sure you understand how and when to take each.   2 mg, Oral, 3 Times Daily      albuterol sulfate  (90 Base) MCG/ACT inhaler  Commonly known as:  PROVENTIL HFA;VENTOLIN HFA;PROAIR HFA  What changed:  You were already taking a medication with the same name, and this prescription was added. Make sure you understand how and when to take each.   2 puffs, Inhalation, Every 4 Hours PRN         Continue These Medications      Instructions Start Date   atenolol 25 MG tablet  Commonly known as:  TENORMIN   25 mg, Oral, Daily      dilTIAZem  MG 24 hr capsule  Commonly known as:  CARDIZEM CD   240 mg, Oral, Daily      rivaroxaban 20 MG tablet  Commonly known as:  XARELTO   20 mg, Oral, Nightly      spironolactone 25 MG tablet  Commonly known as:  ALDACTONE   25 mg, Oral, Daily PRN, Pt takes 1 tablet (25mg) daily. Pt will take an additional (25mg) tablet as needed for edema.             Allergies   Allergen Reactions   • Contrast Dye Swelling     FACE, NOSE AND NECK SWELLING AS WELL AS SANDI    • Doxycycline Unknown - Low Severity     PT UNSURE OF REACTION    • Flexeril [Cyclobenzaprine] Hives   • Keflex [Cephalexin] Unknown - Low Severity     PT DOES NOT REMEMBER    • Kenalog [Triamcinolone Acetonide] Itching   • Penicillins Hives   •  Sulfa Antibiotics Itching   • Tetracyclines & Related Unknown - Low Severity     PT UNSURE OF REACTION   • Levaquin [Levofloxacin] Rash         Discharge Disposition:  Rehab Facility or Unit (DC - External)    Diet:  Hospital:  Diet Order   Procedures   • Diet Regular; Cardiac       Activity:  Activity Instructions     Activity as Tolerated               CODE STATUS:    Code Status and Medical Interventions:   Ordered at: 04/29/20 1943     Code Status:    CPR     Medical Interventions (Level of Support Prior to Arrest):    Full       No future appointments.  ** [place] to call PCP Dr. Leblanc (cell phone [phone]) when patient is being discharged from rehab, so she can arrange for close follow up**       Time Spent on Discharge:  35 minutes    Electronically signed by Shannan Etienne MD, 05/12/20, 11:14

## 2020-05-12 NOTE — PLAN OF CARE
VSS; Patient was pleasant and cooperative during the evening; using purwick- good UOP; paced on the monitor; 2L of supplemental oxygen; around 0400 c/o of back pain- Norco given; patient hopeful to be transferred to [facility] today; continue to monitor.

## 2020-05-14 NOTE — PAYOR COMM NOTE
"Makenna Phan RN Utilization Review [phone]  Fax # [phone]  Ref # [id number]    Please note discharge date.    Gurmeet Aguirre (64 y.o. Female)     Date of Birth Social Security Number Address Home Phone MRN    1955  [address]   [place] [id number] [phone] [id number]    Jew Marital Status          Amish        Admission Date Admission Type Admitting Provider Attending Provider Department, Room/Bed    4/29/20 Emergency Shannan Etienne MD  [facility] 6A, N609/1    Discharge Date Discharge Disposition Discharge Destination        5/12/2020 Rehab Facility or Unit (DC - External)              Attending Provider:  (none)   Allergies:  Contrast Dye, Doxycycline, Flexeril [Cyclobenzaprine], Keflex [Cephalexin], Kenalog [Triamcinolone Acetonide], Penicillins, Sulfa Antibiotics, Tetracyclines & Related, Levaquin [Levofloxacin]    Isolation:  Contact Air   Infection:  COVID (confirmed) (04/30/20)   Code Status:  Prior    Ht:  160 cm (63\")   Wt:  111 kg (244 lb)    Admission Cmt:  None   Principal Problem:  COVID-19 Pneumonitis [U07.1,J12.89]                 Active Insurance as of 4/29/2020     Primary Coverage     Payor Plan Insurance Group Employer/Plan Group    [place] [id number]     Payor Plan Address Payor Plan Phone Number Payor Plan Fax Number Effective Dates    [employer] Claims Office - [address] [address] [phone]  1/1/2020 - None Entered    [facility] [id number]       Subscriber Name Subscriber Birth Date Member ID       GURMEET AGUIRRE 1955 [id number]                 Emergency Contacts      (Rel.) Home Phone Work Phone Mobile Phone    AISHWARYA SPENCER (Son) -- -- [phone]    MADHU ENAMORADO (Daughter) -- -- [phone]               Discharge Summary      Shannan Etienne MD at 05/12/20 1105              [facility] Medicine Services  DISCHARGE SUMMARY    Patient Name: " Marlene Aguirre  : 1955  MRN: [id number]    Date of Admission: 2020  2:54 PM  Date of Discharge:  20  Primary Care Physician: Provider, No Known    Consults     Date and Time Order Name Status Description    2020 0440 Inpatient Cardiology Consult            Hospital Course     Presenting Problem:   Acute hypoxemic respiratory failure (CMS/HCC) [J96.01]    Active Hospital Problems    Diagnosis  POA   • **COVID-19 Pneumonitis [U07.1, J12.89]  Yes   • Acute hypoxemic respiratory failure (CMS/HCC) [J96.01]  Yes   • Paroxysmal atrial fibrillation (CMS/HCC) [I48.0]  Yes   • Obesity (BMI 30-39.9) [E66.9]  Yes      Resolved Hospital Problems    Diagnosis Date Resolved POA   • Fever [R50.9] 2020 Yes          Hospital Course:  Marlene Aguirre is a 64 y.o. female with hx of Afib on Xarelto, pacemaker, asthma, and obesity who presents to the ER on 20 due to progressive cough, dyspnea, and fever despite being placed on Zithromax as an outpatient. She was hypoxic and febrile on arrival. CXR showed patchy airspace disease in the right mid and lower lung, and D-dimer was mildly elevated. V/Q scan was low probability for PE. Subsequent CT chest without contrast showed extensive multifocal regions of moderately dense, patchy ground glass opacities throughout both lungs, typical for COVID-19. Her COVID-19 PCR returned POSITIVE. In the hours following hospital admission, patient had worsening O2 requirements (up to 6 liters) and was transferred to the ICU. She received a dose of Actemra to prevent cytokine storm as well as convalescent plasma per Infectious Disease recommendations. Patient required up to 80% high flow nasal cannula but improved down to 3 liters and was transferred to the floor with hospitalist service resuming care on . In the early morning hours on , she had chest pressure which resolved after nitroglycerin as well as gas pains and belching which resolved with Tums. EKG and  troponins were negative and Echo was unremarkable aside from diastolic dysfunction.     Of note, patient's sister was also hospitalized here at [facility] with COVID-19 as well. Patient reports her son as well as granddaughter also have tested positive. It seems there was a physical therapist who had come to work with patient's son during home PT who had tested positive, so likely the initial source of transmission.    COVID-19 pneumonitis  Acute hypoxic respiratory failure  --s/p Actemra (4/29) and convalescent plasma (4/30).  --ID has followed the patient. Inflammatory markers have been trending down.  --Stable on 2 liters. Unable to wean off completely so will need oxygen at discharge. Encourage  incentive spirometry.  --Mucinex added with improvement, was able to cough up a good amount of mucus this morning.      Transient atypical chest pain 5/8/20  --EKG and troponins unremarkable. Echo 5/8 unremarkable aside from diastolic dysfunction.   --Now chest pain free, likely GI related (improved with Tums). Continue Protonix and PRN Tums.     Hx of PAF  Hx of pacemaker  --Previously on Xarelto, will resume at discharge.  --Continue Diltiazem and Atenolol.     Sciatic nerve pain  --Continue PRN Norco.   --Can use Biofreeze or IcyHot as needed.    **My partner Dr. Gill previously spoke with patient's PCP Dr. Ammy Leblanc (cell phone [phone]) on 5/7/20 with update on patient's condition. She wishes to be called when patient is being discharged home from rehab so she can coordinate close outpatient follow up within 1 week. Please ensure this is done at [facility] prior to discharge home!    Patient will be discharged to [facility] today for rehab. The MD at [facility] requested another COVID-19 test to be sent, this was collected yesterday 5/10/20 and sent to , still pending at time of discharge and [facility] can follow up on this test result. Will likely still be positive.     Discharge Follow Up Recommendations for outpatient  labs/diagnostics:  F/U with PCP in 1 week after discharge from rehab    Day of Discharge     HPI:   Patient seen this morning. Feels much better after coughing up a lot of mucus. Mucinex has helped a lot.    Review of Systems  Gen-no fevers, no chills  CV-no chest pain, no palpitations  Resp-+cough overall improved, no dyspnea  GI-no N/V/D, no abd pain    All other systems reviewed and negative except any additional pertinent positives and negatives as discussed in HPI.      Vital Signs:   Temp:  [97 °F (36.1 °C)-99 °F (37.2 °C)] 97.6 °F (36.4 °C)  Heart Rate:  [59-83] 66  Resp:  [17-18] 18  BP: (103-138)/(71-89) 120/81     Physical Exam:  With patient's consent, physical exam was conducted via visual telemedicine encounter with bedside portion accommodated by nursing staff due to patient's current isolation requirements in the interest of PPE conservation.    Constitutional: No acute distress, awake, alert, nontoxic, normal body habitus, sitting up in  bed today  Respiratory: good effort, nonlabored respirations on 2 L  Cardiovascular:  tele with NSR  Musculoskeletal: No edema, normal muscle tone and mass for age  GI: Soft, no grimace, nondistended  Psychiatric: Appropriate affect, good insight and judgement, cooperative  Neurologic: Oriented x 3, movements symmetric BUE and BLE, speech clear and fluent      Pertinent  and/or Most Recent Results     Results from last 7 days   Lab Units 05/09/20  0542 05/08/20  0641 05/08/20  0424 05/07/20  0556   WBC 10*3/mm3  --   --  6.21 6.24   HEMOGLOBIN g/dL  --   --  12.9 13.9   HEMATOCRIT %  --   --  39.6 41.1   PLATELETS 10*3/mm3  --   --  311 283   SODIUM mmol/L 139 140  --  135*   POTASSIUM mmol/L 4.3 4.3  --  4.2   CHLORIDE mmol/L 99 100  --  98   CO2 mmol/L 28.0 27.0  --  26.0   BUN mg/dL 15 15  --  16   CREATININE mg/dL 0.63 0.65  --  0.62   GLUCOSE mg/dL 101* 116*  --  97   CALCIUM mg/dL 8.6 8.4*  --  8.7     Results from last 7 days   Lab Units 05/08/20  0641      BILIRUBIN mg/dL 0.2   ALK PHOS U/L 43   ALT (SGPT) U/L 16   AST (SGOT) U/L 23           Invalid input(s): TG, LDLCALC, LDLREALC  Results from last 7 days   Lab Units 05/08/20  1043 05/08/20  0641   PROBNP pg/mL  --  84.0   TROPONIN T ng/mL <0.010 <0.010       Brief Urine Lab Results     None          Microbiology Results Abnormal     Procedure Component Value - Date/Time    CORONAVIRUS (COVID-19),RT-PCR,[facility], NP SWAB IN LEXAR SALINE MEDIA - Swab, Nasopharynx [[id number]]  (Abnormal) Collected:  05/07/20 1425    Lab Status:  Final result Specimen:  Swab from Nasopharynx Updated:  05/08/20 1636     Reference Lab Report --     Comment: See scanned report          COVID19 Detected    Blood Culture - Blood, Arm, Right [[id number]] Collected:  04/29/20 1600    Lab Status:  Final result Specimen:  Blood from Arm, Right Updated:  05/04/20 1615     Blood Culture No growth at 5 days    Blood Culture - Blood, Arm, Right [[id number]] Collected:  04/29/20 1550    Lab Status:  Final result Specimen:  Blood from Arm, Right Updated:  05/04/20 1615     Blood Culture No growth at 5 days    Legionella Antigen, Urine - Urine, Urine, Clean Catch [[id number]]  (Normal) Collected:  04/30/20 0911    Lab Status:  Final result Specimen:  Urine, Clean Catch Updated:  04/30/20 1302     LEGIONELLA ANTIGEN, URINE Negative    S. Pneumo Ag Urine or CSF - Urine, Urine, Clean Catch [[id number]]  (Normal) Collected:  04/30/20 0911    Lab Status:  Final result Specimen:  Urine, Clean Catch Updated:  04/30/20 1302     Strep Pneumo Ag Negative    MRSA Screen, PCR (Inpatient) - Swab, Nares [[id number]]  (Normal) Collected:  04/29/20 2157    Lab Status:  Final result Specimen:  Swab from Nares Updated:  04/30/20 0037     MRSA PCR Negative    Narrative:       MRSA Negative    SARS-CoV-2 PCR ([place] IN-HOUSE PERFORMED), NP SWAB IN TRANSPORT MEDIA - Swab, Nasopharynx [[id number]]  (Abnormal) Collected:  04/29/20 1644    Lab Status:  Final result  Specimen:  Swab from Nasopharynx Updated:  04/30/20 0003     COVID19 Detected    Respiratory Panel, PCR - Swab, Nasopharynx [[id number]]  (Normal) Collected:  04/29/20 1644    Lab Status:  Final result Specimen:  Swab from Nasopharynx Updated:  04/29/20 1802     ADENOVIRUS, PCR Not Detected     Coronavirus 229E Not Detected     Coronavirus HKU1 Not Detected     Coronavirus NL63 Not Detected     Coronavirus OC43 Not Detected     Human Metapneumovirus Not Detected     Human Rhinovirus/Enterovirus Not Detected     Influenza B PCR Not Detected     Parainfluenza Virus 1 Not Detected     Parainfluenza Virus 2 Not Detected     Parainfluenza Virus 3 Not Detected     Parainfluenza Virus 4 Not Detected     Bordetella pertussis pcr Not Detected     Influenza A H1 2009 PCR Not Detected     Chlamydophila pneumoniae PCR Not Detected     Mycoplasma pneumo by PCR Not Detected     Influenza A PCR Not Detected     Influenza A H3 Not Detected     Influenza A H1 Not Detected     RSV, PCR Not Detected     Bordetella parapertussis PCR Not Detected    Narrative:       The coronavirus on the RVP is NOT COVID-19 and is NOT indicative of infection with COVID-19.           Imaging Results (All)     Procedure Component Value Units Date/Time    XR Chest 1 View [[id number]] Collected:  05/08/20 0708     Updated:  05/08/20 0710    Narrative:       CR Chest 1 Vw    INDICATION:   Acute respiratory failure with hypoxia and chest pain. COVID 19     COMPARISON:    5/4/2020    FINDINGS:  Single portable AP view(s) of the chest.    Pacing device overlying the left aspect.    The heart size is mildly enlarged. Mediastinal structures are midline.    Redemonstrated are bilateral patchy infiltrates, right side greater than left. No obvious pleural fluid. No pneumothorax.       Impression:       1.  Mild cardiomegaly.    2.  Patchy bilateral airspace disease, right side greater than left. Slight interval worsening when compared to prior exam.    Signer Name:  Jonnathan Fleming MD   Signed: 5/8/2020 7:08 AM   Workstation Name: [id number]    [facility]    XR Chest 1 View [[id number]] Collected:  05/04/20 0818     Updated:  05/04/20 0952    Narrative:       EXAMINATION: XR CHEST 1 VW- 05/04/2020     INDICATION: J96.01-Acute respiratory failure with hypoxia;  U07.1-COVID-19      COMPARISON: 05/01/2020     FINDINGS: Portable chest reveals patchy airspace disease seen within the  right upper lobe which is stable and unchanged. The heart is enlarged.  Increased markings seen within left mid and lower lung field.  Degenerative changes seen within the spine. Cardiac pacer leads in  satisfactory position.           Impression:       Some improvement seen in aeration of the right upper and  left lower lung field. The heart is enlarged. Pacer leads remain in  satisfactory position.     D:  05/04/2020  E:  05/04/2020     This report was finalized on 5/4/2020 9:49 AM by Dr. Suzy Muhammad MD.       XR Chest 1 View [[id number]] Collected:  05/01/20 0842     Updated:  05/01/20 2244    Narrative:       EXAMINATION: XR CHEST 1 VW-     INDICATION: Resp Distress; J96.01-Acute respiratory failure with  hypoxia; U07.1-COVID-19     COMPARISON: 04/29/2020     FINDINGS: Patchy disease in the right upper lung has moderately  increased. There is mildly increased interstitial change in the left  perihilar region and right base. No dense lung consolidation effusion or  pneumothorax is seen. The heart is mildly enlarged. Vasculature is  slightly cephalized.          Impression:       Increased patchy bilateral interstitial disease, greater in  the right upper lung than elsewhere.         This report was finalized on 5/1/2020 10:41 PM by Dr. Jose Martell MD.       NM Lung Scan Perfusion Particulate [[id number]] Collected:  04/29/20 1825     Updated:  04/30/20 1642    Narrative:       EXAMINATION: NM LUNG SCAN, PERFUSION PARTICULATE-04/29/2020:     INDICATION: Hypoxemia in COVID  patient with IV dye allergy; J96.01-Acute  respiratory failure with hypoxia; U07.1-COVID-19.      TECHNIQUE: Nuclear medicine perfusion scan performed with 5.5 mCi of MAA  administered.     COMPARISON: NONE.     FINDINGS: Heterogeneity of perfusion correlating with acute parenchymal  disease process on chest x-ray performed same day, however, no large  defect to suggest perfusional abnormality.       Impression:       Heterogeneity of chronic findings along with acute airspace  disease as seen on chest x-ray performed same day without perfusional  defect otherwise identified, overall low probability for perfusional  abnormality. If symptoms persistent and/or progress, CT angiogram of the  chest may be considered for further evaluation.     D:  04/29/2020  E:  04/30/2020            This report was finalized on 4/30/2020 4:38 PM by Dr. Homer Zhu.       XR Chest 1 View [[id number]] Collected:  04/29/20 1732     Updated:  04/30/20 1641    Narrative:       EXAMINATION: XR CHEST 1 VW-04/29/2020:      INDICATION: COVID pneumonia.      COMPARISON: NONE.     FINDINGS: Cardiomegaly with increased pulmonary vascularity. Scattered  right mid and lower lung opacifications consistent with acute airspace  disease. No pneumothorax or pleural effusion. Degenerative changes of  the spine. Left chest wall pacing device with leads intact.           Impression:       Patchy opacifications throughout the right mid and lower  lung consistent with acute airspace disease such as bronchopneumonia. No  pneumothorax or pleural effusion.     D:  04/29/2020  E:  04/30/2020     This report was finalized on 4/30/2020 4:38 PM by Dr. Homer Zhu.       CT Chest Without Contrast [[id number]] Collected:  04/30/20 0242     Updated:  04/30/20 0244    Narrative:       CT CHEST, NONCONTRAST, 4/30/2020    HISTORY:  64-year-old female hospital inpatient with acute respiratory failure with hypoxia. Positive testing for COVID-19 about one week  prior.    TECHNIQUE:  CT imaging of the chest without IV contrast. Radiation dose reduction techniques included automated exposure control. Radiation audit for CT and nuclear cardiology exams in the last 12 months: 0.    FINDINGS:  Extensive multifocal regions of patchy groundglass opacity are scattered throughout the both lungs in association with thickening of the interlobular and intralobular septa in these regions. These are commonly reported imaging features of COVID-19  pneumonia.    There is no dense airspace consolidation or pleural effusion. Trachea and central bronchi are normal in caliber and widely patent. No thoracic adenopathy.    Mild cardiomegaly, but no pericardial effusion. Normal caliber thoracic aorta. Cardiac pacemaker. Limited upper abdominal images showing cholelithiasis.      Impression:       1.  Extensive multifocal regions of moderately dense, patchy groundglass pulmonary opacity throughout both lungs.  2.  COVID-19 reporting category: Typical. Commonly reported imaging features of COVID-19 pneumonia are present. Viral testing reportedly confirms the diagnosis.    Signer Name: Hesham Veliz MD   Signed: 4/30/2020 2:42 AM   Workstation Name: [email or web address]    [facility]                    Results for orders placed during the hospital encounter of 04/29/20   Adult Transthoracic Echo Complete W/ Cont if Necessary Per Protocol    Narrative · Calculated EF = 59.0%.  · Left ventricular wall thickness is consistent with mild concentric   hypertrophy.  · Left ventricular systolic function is normal.  · Left ventricular diastolic dysfunction (grade I) consistent with   impaired relaxation.  · No significant structural or functional valvular disease.           Order Current Status    CORONAVIRUS (COVID-19),RT-PCR,  HOSPITAL, NP SWAB IN TRANSPORT MEDIA - Swab, Nasopharynx In process        Discharge Details        Discharge Medications      New Medications       Instructions Start Date   furosemide 20 MG tablet  Commonly known as:  LASIX   20 mg, Oral, Daily   Start Date:  May 13, 2020     guaiFENesin 600 MG 12 hr tablet  Commonly known as:  MUCINEX   600 mg, Oral, Every 12 Hours Scheduled      HYDROcodone-acetaminophen 5-325 MG per tablet  Commonly known as:  NORCO   1 tablet, Oral, Every 8 Hours PRN      pravastatin 40 MG tablet  Commonly known as:  PRAVACHOL   40 mg, Oral, Nightly         Changes to Medications      Instructions Start Date   albuterol 2 MG tablet  Commonly known as:  PROVENTIL  What changed:  Another medication with the same name was added. Make sure you understand how and when to take each.   2 mg, Oral, 3 Times Daily      albuterol sulfate  (90 Base) MCG/ACT inhaler  Commonly known as:  PROVENTIL HFA;VENTOLIN HFA;PROAIR HFA  What changed:  You were already taking a medication with the same name, and this prescription was added. Make sure you understand how and when to take each.   2 puffs, Inhalation, Every 4 Hours PRN         Continue These Medications      Instructions Start Date   atenolol 25 MG tablet  Commonly known as:  TENORMIN   25 mg, Oral, Daily      dilTIAZem  MG 24 hr capsule  Commonly known as:  CARDIZEM CD   240 mg, Oral, Daily      rivaroxaban 20 MG tablet  Commonly known as:  XARELTO   20 mg, Oral, Nightly      spironolactone 25 MG tablet  Commonly known as:  ALDACTONE   25 mg, Oral, Daily PRN, Pt takes 1 tablet (25mg) daily. Pt will take an additional (25mg) tablet as needed for edema.             Allergies   Allergen Reactions   • Contrast Dye Swelling     FACE, NOSE AND NECK SWELLING AS WELL AS SANDI    • Doxycycline Unknown - Low Severity     PT UNSURE OF REACTION    • Flexeril [Cyclobenzaprine] Hives   • Keflex [Cephalexin] Unknown - Low Severity     PT DOES NOT REMEMBER    • Kenalog [Triamcinolone Acetonide] Itching   • Penicillins Hives   • Sulfa Antibiotics Itching   • Tetracyclines & Related Unknown - Low Severity      PT UNSURE OF REACTION   • Levaquin [Levofloxacin] Rash         Discharge Disposition:  Rehab Facility or Unit (DC - External)    Diet:  Hospital:  Diet Order   Procedures   • Diet Regular; Cardiac       Activity:  Activity Instructions     Activity as Tolerated               CODE STATUS:    Code Status and Medical Interventions:   Ordered at: 04/29/20 1943     Code Status:    CPR     Medical Interventions (Level of Support Prior to Arrest):    Full       No future appointments.  **[facility] to call PCP Dr. Leblanc (cell phone [phone]) when patient is being discharged from rehab, so she can arrange for close follow up**       Time Spent on Discharge:  35 minutes    Electronically signed by Shannan Loera MD, 05/12/20, 11:14          Electronically signed by Shannan Loera MD at 05/12/20 1114       Discharge Order (From admission, onward)     Start     Ordered    05/12/20 1103  Discharge patient  Once     Expected Discharge Date:  05/12/20    Discharge Disposition:  Rehab Facility or Unit (DC - External)    Physician of Record for Attribution - Please select from Treatment Team:  SHANNAN LOERA [[id number]]    Review needed by CMO to determine Physician of Record:  No       Question Answer Comment   Physician of Record for Attribution - Please select from Treatment Team SHANNAN LOERA    Review needed by CMO to determine Physician of Record No        05/12/20 3719

## 2020-05-21 ENCOUNTER — READMISSION MANAGEMENT (OUTPATIENT)
Dept: CALL CENTER | Facility: HOSPITAL | Age: 65
End: 2020-05-21

## 2020-05-21 NOTE — OUTREACH NOTE
Prep Survey      Responses   [facility] facility patient discharged from?  [place]   Is LACE score < 7 ?  No   Eligibility  Readm Mgmt   Discharge diagnosis  COVID-19 Pneumonitis    COVID-19 Test Status  Confirmed   Does the patient have one of the following disease processes/diagnoses(primary or secondary)?  COPD/Pneumonia   Does the patient have Home health ordered?  No   Is there a DME ordered?  No   General alerts for this patient  DC from rehab on 5/18   Prep survey completed?  Yes          Valeria Rivera RN

## 2020-05-22 ENCOUNTER — READMISSION MANAGEMENT (OUTPATIENT)
Dept: CALL CENTER | Facility: HOSPITAL | Age: 65
End: 2020-05-22

## 2020-05-22 NOTE — OUTREACH NOTE
COPD/PN Week 2 Survey      Responses   [facility] patient discharged from?  [place]   COVID-19 Test Status  Confirmed   Does the patient have one of the following disease processes/diagnoses(primary or secondary)?  COPD/Pneumonia   Week 2 attempt successful?  Yes   Call start time  1055   Call end time  1100   General alerts for this patient  DC from rehab on 5/18   Discharge diagnosis  COVID-19 Pneumonitis    Meds reviewed with patient/caregiver?  Yes   Is the patient having any side effects they believe may be caused by any medication additions or changes?  No   Does the patient have all medications ordered at discharge?  Yes   Is the patient taking all medications as directed (includes completed medication regime)?  Yes   Medication comments  PATIENT STATES SHE HAS ALL THE MEDICATIONS SHE NEEDS.    Does the patient have a primary care provider?   Yes   Comments regarding PCP  PATIENT STATES SHE HAS SPOKEN TO HER PCP BY PHONE   Has the patient kept scheduled appointments due by today?  Yes   Has home health visited the patient within 72 hours of discharge?  N/A   Pulse Ox monitoring  None   Did the patient receive a copy of their discharge instructions?  Yes   Nursing interventions  Reviewed instructions with patient   What is the patient's perception of their health status since discharge?  Improving   Nursing Interventions  Nurse provided patient education   Is the patient/caregiver able to teach back the hierarchy of who to call/visit for symptoms/problems? PCP, Specialist, Home health nurse, Urgent Care, ED, 911  Yes   Additional teach back comments  PATIENT STATES THAT SHE IS DOING WELL, WITH NO SOA OR COUGH. SHE IS NOT USING O2. SHE IS STAYING WITH HER FAMILY.    Is the patient/caregiver able to teach back signs and symptoms of worsening condition:  Fever/chills, Shortness of breath, Chest pain   Is the patient/caregiver able to teach back importance of completing antibiotic course of treatment?  Yes    Week 2 call completed?  Yes          Laura Hauser LPN

## 2020-05-25 ENCOUNTER — READMISSION MANAGEMENT (OUTPATIENT)
Dept: CALL CENTER | Facility: HOSPITAL | Age: 65
End: 2020-05-25

## 2020-05-25 NOTE — OUTREACH NOTE
COPD/PN Week 2 Survey      Responses   [facility] patient discharged from?  [place]   COVID-19 Test Status  Confirmed   Does the patient have one of the following disease processes/diagnoses(primary or secondary)?  COPD/Pneumonia   Week 2 attempt successful?  Yes   Call start time  1157   Call end time  1159   General alerts for this patient  DC from rehab on 5/18   Discharge diagnosis  COVID-19 Pneumonitis    Is patient permission given to speak with other caregiver?  Yes   Person spoke with today (if not patient) and relationship  Pt and her daughter, Misty.   Meds reviewed with patient/caregiver?  Yes   Is the patient having any side effects they believe may be caused by any medication additions or changes?  No   Does the patient have all medications ordered at discharge?  Yes   Is the patient taking all medications as directed (includes completed medication regime)?  Yes   Medication comments  Reports she is no longer on Lasix, and rehab provider changed her back to Spirinolactone.    Does the patient have a primary care provider?   Yes   Does the patient have an appointment with their PCP or pulmonologist within 7 days of discharge?  No   What is preventing the patient from scheduling follow up appointments within 7 days of discharge?  Waiting on return call   Nursing Interventions  Educated patient on importance of making appointment, Advised patient to make appointment   Has the patient kept scheduled appointments due by today?  N/A   Has home health visited the patient within 72 hours of discharge?  N/A   Pulse Ox monitoring  None   Psychosocial issues?  No   Is the patient/caregiver able to teach back signs and symptoms of worsening condition:  Fever/chills, Shortness of breath, Chest pain   Is the patient/caregiver able to teach back importance of completing antibiotic course of treatment?  Yes   Week 2 call completed?  Yes          Aurelio Garrett RN

## 2020-06-01 ENCOUNTER — READMISSION MANAGEMENT (OUTPATIENT)
Dept: CALL CENTER | Facility: HOSPITAL | Age: 65
End: 2020-06-01

## 2020-06-01 NOTE — OUTREACH NOTE
COPD/PN Week 3 Survey      Responses   [facility] [place] patient discharged from?  [place]   COVID-19 Test Status  Confirmed   Does the patient have one of the following disease processes/diagnoses(primary or secondary)?  COPD/Pneumonia   Was the primary reason for admission:  Pneumonia   Week 3 attempt successful?  Yes   Call start time  0916   Call end time  0928   Meds reviewed with patient/caregiver?  Yes   Is the patient having any side effects they believe may be caused by any medication additions or changes?  No   Does the patient have all medications ordered at discharge?  Yes   Is the patient taking all medications as directed (includes completed medication regime)?  Yes   Comments regarding appointments  Cardiology appt was cancelled-will have another appt with PCP on 06/05/20-has had telehealth visit with cardiologist.   Does the patient have a primary care provider?   Yes   Does the patient have an appointment with their PCP or pulmonologist within 7 days of discharge?  -- [na-does not have a pulmonologist]   Has the patient kept scheduled appointments due by today?  Yes   Has home health visited the patient within 72 hours of discharge?  N/A   Pulse Ox monitoring  None [STates may borrow her son's pulse ox-reviewed normal sats, and to call if O2 drops below 92%.]   Psychosocial issues?  No   Did the patient receive a copy of their discharge instructions?  Yes   Nursing interventions  Reviewed instructions with patient   What is the patient's perception of their health status since discharge?  Improving   Nursing Interventions  Nurse provided patient education   Are the patient's immunizations up to date?   No   Nursing interventions  Educated on importance of maintaining up to date immunizations as advised by provider   Is the patient/caregiver able to teach back the hierarchy of who to call/visit for symptoms/problems? PCP, Specialist, Home health nurse, Urgent Care, ED, 911  Yes   Is the  "patient/caregiver able to teach back signs and symptoms of worsening condition:  Fever/chills, Shortness of breath, Chest pain   Is the patient/caregiver able to teach back importance of completing antibiotic course of treatment?  Yes   Week 3 call completed?  Yes   Wrap up additional comments  STates is improving-denies any fever/chills or chest pain. States some SOA on exertion, fatigue continues. STates overall better. Having some \"allergy\" s/s today. Denies any other problems.          Sarah John RN  "

## 2020-06-08 ENCOUNTER — READMISSION MANAGEMENT (OUTPATIENT)
Dept: CALL CENTER | Facility: HOSPITAL | Age: 65
End: 2020-06-08

## 2020-06-08 NOTE — OUTREACH NOTE
COPD/PN Week 4 Survey      Responses   [facility] patient discharged from?  [place]   COVID-19 Test Status  Confirmed   Does the patient have one of the following disease processes/diagnoses(primary or secondary)?  COPD/Pneumonia   Was the primary reason for admission:  Pneumonia   Week 4 attempt successful?  Yes   Call start time  0911   Call end time  0916   Meds reviewed with patient/caregiver?  Yes   Is the patient having any side effects they believe may be caused by any medication additions or changes?  No   Is the patient taking all medications as directed (includes completed medication regime)?  Yes   Medication comments  Magnesium was discontinued by PCP-added Welbutrin for anxiety.   Has the patient kept scheduled appointments due by today?  Yes   Is the patient still receiving Home Health Services?  N/A   Pulse Ox monitoring  None   Psychosocial issues?  No   What is the patient's perception of their health status since discharge?  Improving   Nursing Interventions  Nurse provided patient education   Is the patient/caregiver able to teach back the hierarchy of who to call/visit for symptoms/problems? PCP, Specialist, Home health nurse, Urgent Care, ED, 911  Yes   Is the patient/caregiver able to teach back signs and symptoms of worsening condition:  Fever/chills, Shortness of breath, Chest pain   Is the patient/caregiver able to teach back importance of completing antibiotic course of treatment?  Yes   Week 4 call completed?  Yes   Would the patient like one additional call?  Yes   Wrap up additional comments  States is improving-will be started on medication for anxiety. States still some SOA, fatigue on exertion, but continues to improve. Denies any complaints today.          Sarah John RN

## 2020-06-15 ENCOUNTER — READMISSION MANAGEMENT (OUTPATIENT)
Dept: CALL CENTER | Facility: HOSPITAL | Age: 65
End: 2020-06-15

## 2020-06-15 NOTE — OUTREACH NOTE
COPD/PN Week 5 Survey      Responses   [facility] patient discharged from?  [place]   COVID-19 Test Status  Confirmed   Does the patient have one of the following disease processes/diagnoses(primary or secondary)?  COPD/Pneumonia   Was the primary reason for admission:  Pneumonia   Week 5 attempt successful?  Yes   Call start time  0915   Call end time  0917   Meds reviewed with patient/caregiver?  Yes   Is the patient having any side effects they believe may be caused by any medication additions or changes?  No   Is the patient taking all medications as directed (includes completed medication regime)?  Yes   Has the patient kept scheduled appointments due by today?  Yes   Is the patient still receiving Home Health Services?  N/A   Pulse Ox monitoring  None   Psychosocial issues?  No   What is the patient's perception of their health status since discharge?  Improving   Is the patient/caregiver able to teach back signs and symptoms of worsening condition:  Fever/chills, Shortness of breath, Chest pain   Is the patient/caregiver able to teach back importance of completing antibiotic course of treatment?  Yes   Graduated  Yes   Wrap up additional comments  States is doing well-denies any complaints today.          Sarah John RN

## 2020-07-11 ENCOUNTER — PATIENT OUTREACH (OUTPATIENT)
Dept: CALL CENTER | Facility: HOSPITAL | Age: 65
End: 2020-07-11

## 2020-07-11 NOTE — OUTREACH NOTE
COVID-19 Plasma Donation Recruitment Note      Responses   Plasma Donation Outreach Call Successful?  Call was Successful   Do you currently have any of the following?  Weakness   Symptoms Cease Date  06/02/20   Have you ever been told you couldn't give blood?  No   Disposition:  Referred to [facility]        She is going to consider donating, will contact [facility] at that time.     Shayna Gaspar RN    7/11/2020, 11:16

## 2020-07-13 ENCOUNTER — TRANSCRIBE ORDERS (OUTPATIENT)
Dept: ADMINISTRATIVE | Facility: HOSPITAL | Age: 65
End: 2020-07-13

## 2020-07-13 DIAGNOSIS — Z12.31 VISIT FOR SCREENING MAMMOGRAM: Primary | ICD-10-CM

## 2021-03-17 ENCOUNTER — TRANSCRIBE ORDERS (OUTPATIENT)
Dept: ADMINISTRATIVE | Facility: HOSPITAL | Age: 66
End: 2021-03-17

## 2021-03-17 DIAGNOSIS — Z12.31 VISIT FOR SCREENING MAMMOGRAM: Primary | ICD-10-CM

## 2021-06-08 ENCOUNTER — APPOINTMENT (OUTPATIENT)
Dept: GENERAL RADIOLOGY | Facility: HOSPITAL | Age: 66
End: 2021-06-08

## 2021-06-08 ENCOUNTER — HOSPITAL ENCOUNTER (EMERGENCY)
Facility: HOSPITAL | Age: 66
Discharge: HOME | End: 2021-06-08
Attending: EMERGENCY MEDICINE | Admitting: EMERGENCY MEDICINE

## 2021-06-08 VITALS
BODY MASS INDEX: 51.53 KG/M2 | WEIGHT: 280 LBS | TEMPERATURE: 97.9 F | RESPIRATION RATE: 17 BRPM | OXYGEN SATURATION: 96 % | HEIGHT: 62 IN | HEART RATE: 62 BPM | DIASTOLIC BLOOD PRESSURE: 75 MMHG | SYSTOLIC BLOOD PRESSURE: 153 MMHG

## 2021-06-08 DIAGNOSIS — S80.01XA CONTUSION OF RIGHT KNEE, INITIAL ENCOUNTER: ICD-10-CM

## 2021-06-08 DIAGNOSIS — S80.02XA CONTUSION OF LEFT KNEE, INITIAL ENCOUNTER: Primary | ICD-10-CM
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
## 2021-06-08 PROCEDURE — 72170 X-RAY EXAM OF PELVIS: CPT

## 2021-06-08 PROCEDURE — 73560 X-RAY EXAM OF KNEE 1 OR 2: CPT

## 2021-06-08 PROCEDURE — 63710000001 DIPHENHYDRAMINE PER 50 MG: Performed by: PHYSICIAN ASSISTANT

## 2021-06-08 PROCEDURE — 99283 EMERGENCY DEPT VISIT LOW MDM: CPT

## 2021-06-08 RX ORDER — HYDROCODONE BITARTRATE AND ACETAMINOPHEN 5; 325 MG/1; MG/1
1 TABLET ORAL ONCE
Status: COMPLETED | OUTPATIENT
Start: 2021-06-08 | End: 2021-06-08

## 2021-06-08 RX ORDER — DIPHENHYDRAMINE HCL 25 MG
25 CAPSULE ORAL ONCE
Status: COMPLETED | OUTPATIENT
Start: 2021-06-08 | End: 2021-06-08

## 2021-06-08 RX ADMIN — DIPHENHYDRAMINE HYDROCHLORIDE 25 MG: 25 CAPSULE ORAL at 18:16

## 2021-06-08 RX ADMIN — HYDROCODONE BITARTRATE AND ACETAMINOPHEN 1 TABLET: 5; 325 TABLET ORAL at 18:16

## 2021-06-08 NOTE — ED PROVIDER NOTES
EMERGENCY DEPARTMENT ENCOUNTER    Pt Name: Marlene Aguirre  MRN: [id number]  Pt :   1955  Room Number:    Date of encounter:  2021  PCP: Provider, No Known  ED Provider: Dmitriy Lundberg PA-C    Historian: Patient      HPI:  Chief Complaint: Left knee pain        Context: Marlene Aguirre is a 65 y.o. female who presents to the ED via EMS after sustaining a fall at her home.  She states she tripped over a cord, falling onto her left knee.  She complains of throbbing aching pain anteriorly, and inability to bear weight.  Nonradiating.  Worsened by weightbearing, improved with rest.  She denies hip pelvis pain, no hand wrist or arm pain.  She denies other symptoms or injuries at this time.      PAST MEDICAL HISTORY  Past Medical History:   Diagnosis Date   • Asthma    • Atrial fibrillation (CMS/HCC)    • Coronary artery disease    • COVID-19    • Hypertension          PAST SURGICAL HISTORY  Past Surgical History:   Procedure Laterality Date   • BILATERAL BREAST REDUCTION     • BRAIN SURGERY     • DILATATION AND CURETTAGE     • PACEMAKER IMPLANTATION           FAMILY HISTORY  History reviewed. No pertinent family history.      SOCIAL HISTORY  Social History     Socioeconomic History   • Marital status:      Spouse name: Not on file   • Number of children: Not on file   • Years of education: Not on file   • Highest education level: Not on file   Tobacco Use   • Smoking status: Former Smoker   • Smokeless tobacco: Never Used   • Tobacco comment: QUIT 3 YEARS AGO   Substance and Sexual Activity   • Alcohol use: Never   • Drug use: Never   • Sexual activity: Defer         ALLERGIES  Contrast dye, Ciprofloxacin, Doxycycline, Flexeril [cyclobenzaprine], Keflex [cephalexin], Kenalog [triamcinolone acetonide], Penicillins, Sulfa antibiotics, Tetracyclines & related, and Levaquin [levofloxacin]        REVIEW OF SYSTEMS  Review of Systems   Constitutional: Positive for activity change. Negative for  chills and fatigue.   HENT: Negative.    Respiratory: Negative.    Cardiovascular: Negative.    Gastrointestinal: Negative.    Genitourinary: Negative.    Musculoskeletal: Positive for arthralgias (Left knee pain). Negative for back pain, joint swelling, myalgias and neck pain.   All other systems reviewed and are negative.         All systems reviewed and negative except for those discussed in HPI.       PHYSICAL EXAM    I have reviewed the triage vital signs and nursing notes.    ED Triage Vitals   Temp Heart Rate Resp BP SpO2   06/08/21 1732 06/08/21 1733 06/08/21 1733 06/08/21 1733 06/08/21 1733   97.9 °F (36.6 °C) 69 18 147/76 98 %      Temp src Heart Rate Source Patient Position BP Location FiO2 (%)   06/08/21 1732 06/08/21 1733 06/08/21 1733 06/08/21 1733 --   Oral Monitor Lying Left arm        Physical Exam  GENERAL:   Not toxic appearing hemodynamically stable.  Afebrile.  HENT: Nares patent.  EYES: No scleral icterus.  CV: Regular rhythm, regular rate.  RESPIRATORY: Normal effort.  No audible wheezes, rales or rhonchi.  ABDOMEN: Soft, nontender  MUSCULOSKELETAL: No deformities.  Tenderness to light palpation to the left patellar area, no external sign of injury.  No obvious crepitus or step-off noted.  Range of motion not assessed due to patient's pain level and guarding.  Neurovascular status is intact distally.  NEURO: Alert, moves all extremities, follows commands.  SKIN: Warm, dry, no rash visualized.        LAB RESULTS  No results found for this or any previous visit (from the past 24 hour(s)).    If labs were ordered, I independently reviewed the results.        RADIOLOGY  XR Knee 1 or 2 View Left, XR Pelvis 1 or 2 View, XR Knee 1 or 2 View Right    Result Date: 6/9/2021  EXAMINATION: XR PELVIS 1 OR 2 VW-, XR KNEE 1 OR 2 VW LEFT-, XR KNEE 1 OR 2 VW RIGHT-  INDICATION: Fall at home, pain both knees, left lower leg.  COMPARISON: None.  FINDINGS: Single AP view of the pelvis along with AP and lateral  views of the bilateral knees reveal moderate tricompartmental DJD of the bilateral knees greatest in the medial compartments right knee greater than left without acute fracture or effusion.         No acute osseous findings of the pelvis single view or bilateral knees with moderate DJD bilateral knees right greater than left of medial femorotibial compartment joint space narrowing without acute fracture or large effusion.  D:  06/08/2021 E:  06/09/2021              PROCEDURES    Procedures    No orders to display       MEDICATIONS GIVEN IN ER    Medications   diphenhydrAMINE (BENADRYL) capsule 25 mg (25 mg Oral Given 6/8/21 1816)   HYDROcodone-acetaminophen (NORCO) 5-325 MG per tablet 1 tablet (1 tablet Oral Given 6/8/21 1816)         PROGRESS, DATA ANALYSIS, CONSULTS, AND MEDICAL DECISION MAKING    All labs have been independently reviewed by me.  All radiology studies have been reviewed by me and the radiologist dictating the report.   EKG's have been independently viewed and interpreted by me.                       AS OF 15:01 EDT VITALS:    BP - 153/75  HR - 62  TEMP - 97.9 °F (36.6 °C) (Oral)  O2 SATS - 96%        DIAGNOSIS  Final diagnoses:   Contusion of left knee, initial encounter   Contusion of right knee, initial encounter   Fall, initial encounter         DISPOSITION  DISCHARGE    Patient discharged in stable condition.    Reviewed implications of results, diagnosis, meds, responsibility to follow up, warning signs and symptoms of possible worsening, potential complications and reasons to return to ER.    Patient/Family voiced understanding of above instructions.    Discussed plan for discharge, as there is no emergent indication for admission.  Pt/family is agreeable and understands need for follow up and possible repeat testing.  Pt/family is aware that discharge does not mean that nothing is wrong but that it indicates no emergency is currently present that requires admission and they must continue  care with follow-up as given below or with a physician of their choice.     FOLLOW-UP  PATIENT CONNECTION - [place] [address]  [phone]  Call   To arrange appointment with primary care provider         Medication List      Changed    furosemide 20 MG tablet  Commonly known as: LASIX  Take 1 tablet by mouth Daily.  What changed:   · when to take this  · reasons to take this                     Dmitriy Lundberg PA-C  06/09/21 3592

## 2021-06-08 NOTE — DISCHARGE INSTRUCTIONS
Cool compresses to sore areas tonight and tomorrow every few hours for 20 to 30 minutes.  Use caution when arising from a lying or seated position and use caution when ambulating.  Follow-up with your primary care provider in 3 days for recheck.  If no primary care provider, call the number listed above to schedule an appointment with a [facility] primary care provider.  Return to the emergency department immediately if any change or worsening of symptoms.

## 2021-08-02 ENCOUNTER — HOSPITAL ENCOUNTER (OUTPATIENT)
Dept: MAMMOGRAPHY | Facility: HOSPITAL | Age: 66
End: 2021-08-02

## 2023-07-12 ENCOUNTER — PREP FOR SURGERY (OUTPATIENT)
Dept: GASTROENTEROLOGY | Facility: CLINIC | Age: 68
End: 2023-07-12

## 2023-07-12 DIAGNOSIS — Z12.11 SCREENING FOR COLON CANCER: Primary | ICD-10-CM

## 2023-08-01 ENCOUNTER — TELEMEDICINE (OUTPATIENT)
Dept: INTERNAL MEDICINE | Facility: CLINIC | Age: 68
End: 2023-08-01
Payer: MEDICAID

## 2023-08-01 DIAGNOSIS — Z12.11 SCREEN FOR COLON CANCER: ICD-10-CM

## 2023-08-01 DIAGNOSIS — F41.1 GAD (GENERALIZED ANXIETY DISORDER): Primary | ICD-10-CM

## 2023-08-01 PROCEDURE — 1159F MED LIST DOCD IN RCRD: CPT | Performed by: PHYSICIAN ASSISTANT

## 2023-08-01 PROCEDURE — 1160F RVW MEDS BY RX/DR IN RCRD: CPT | Performed by: PHYSICIAN ASSISTANT

## 2023-08-01 PROCEDURE — 99213 OFFICE O/P EST LOW 20 MIN: CPT | Performed by: PHYSICIAN ASSISTANT

## 2023-08-01 RX ORDER — ESCITALOPRAM OXALATE 5 MG/1
5 TABLET ORAL DAILY
Qty: 30 TABLET | Refills: 5 | Status: SHIPPED | OUTPATIENT
Start: 2023-08-01

## 2023-08-16 ENCOUNTER — TELEMEDICINE (OUTPATIENT)
Dept: INTERNAL MEDICINE | Facility: CLINIC | Age: 68
End: 2023-08-16
Payer: MEDICAID

## 2023-08-16 DIAGNOSIS — F41.1 GAD (GENERALIZED ANXIETY DISORDER): Primary | ICD-10-CM

## 2023-08-16 DIAGNOSIS — I10 PRIMARY HYPERTENSION: ICD-10-CM

## 2023-08-16 PROCEDURE — 1160F RVW MEDS BY RX/DR IN RCRD: CPT | Performed by: PHYSICIAN ASSISTANT

## 2023-08-16 PROCEDURE — 99213 OFFICE O/P EST LOW 20 MIN: CPT | Performed by: PHYSICIAN ASSISTANT

## 2023-08-16 PROCEDURE — 1159F MED LIST DOCD IN RCRD: CPT | Performed by: PHYSICIAN ASSISTANT

## 2023-08-16 RX ORDER — DILTIAZEM HYDROCHLORIDE 240 MG/1
240 CAPSULE, COATED, EXTENDED RELEASE ORAL DAILY
Qty: 90 CAPSULE | Refills: 1 | Status: SHIPPED | OUTPATIENT
Start: 2023-08-16

## 2023-08-16 NOTE — PROGRESS NOTES
[facility] PRIMARY [facility]  [address]  [place] [facility] [address]  Dept: [phone]  Dept Fax: [phone]  Loc: [phone]  Loc Fax: [phone]    Marlene Aguirre  1955    Televisit Note    You have chosen to receive care through a telehealth visit. Do you consent to use a telehealth visit for your medical care today? Yes. Pt at home and provider in office during visit.    History of Present Illness:  Marlene Aguirre is a 68 y.o. female who presents via video-conference for COLLETTE.. pt picked up Lexapro just 4 days ago and has only been on this for this amount of time. Does not notice any effects for relief of anxiety sx.    The following portions of the patient's history were reviewed and updated as appropriate: allergies, current medications, past family history, past medical history, past social history, past surgical history, and problem list.    This visit was scheduled as a phone visit to comply with patient safety concerns in accordance with CDC recommendations.  Time spent in discussion with the patient was 15 minutes.     Medications    Current Outpatient Medications:     dilTIAZem CD (CARDIZEM CD) 240 MG 24 hr capsule, Take 1 capsule by mouth Daily., Disp: 90 capsule, Rfl: 1    albuterol sulfate  (90 Base) MCG/ACT inhaler, Inhale 2 puffs Every 4 (Four) Hours As Needed for Wheezing., Disp: , Rfl:     aspirin 81 MG EC tablet, Take 1 tablet by mouth Daily., Disp: 90 tablet, Rfl: 1    atenolol (TENORMIN) 25 MG tablet, Take 1 tablet by mouth Daily., Disp: , Rfl:     Cholecalciferol 25 MCG (1000 UT) capsule, VITAMIN D-3 25 MCG (1000 UT) CAPS, Disp: , Rfl:     escitalopram (Lexapro) 5 MG tablet, Take 1 tablet by mouth Daily., Disp: 30 tablet, Rfl: 5    loratadine (CLARITIN) 10 MG tablet, Take 1 tablet by mouth Daily., Disp: 90 tablet, Rfl: 1    rosuvastatin (CRESTOR) 20 MG tablet, , Disp: , Rfl:     spironolactone (ALDACTONE) 25 MG tablet, Take 1 tablet  by mouth Daily As Needed. Pt takes 1 tablet (25mg) daily. Pt will take an additional (25mg) tablet as needed for edema., Disp: , Rfl:     Subjective  Allergies   Allergen Reactions    Contrast Dye (Echo Or Unknown Ct/Mr) Swelling     FACE, NOSE AND NECK SWELLING AS WELL AS SANDI     Ciprofloxacin Itching    Doxycycline Unknown - Low Severity     PT UNSURE OF REACTION     Flexeril [Cyclobenzaprine] Hives    Keflex [Cephalexin] Unknown - Low Severity     PT DOES NOT REMEMBER     Kenalog [Triamcinolone Acetonide] Itching    Penicillins Hives    Sulfa Antibiotics Itching    Tetracyclines & Related Unknown - Low Severity     PT UNSURE OF REACTION    Levaquin [Levofloxacin] Rash        Past Medical History:   Diagnosis Date    Acid reflux     Allergic     Arthritis     Asthma     Atrial fibrillation     Coronary artery disease     COVID-19     High cholesterol     History of medical problems     DNC . Leap procedures  and     Hypertension     Low back pain     Migraine headache     Obesity     Pacemaker     Visual impairment        Past Surgical History:   Procedure Laterality Date    BILATERAL BREAST REDUCTION      BRAIN SURGERY      BREAST SURGERY      DILATATION AND CURETTAGE      PACEMAKER IMPLANTATION         Family History   Problem Relation Age of Onset    Heart disease Mother     Hyperlipidemia Mother     Hypertension Mother     Vision loss Mother     Osteoporosis Mother     Hypertension Father     Asthma Sister     Depression Sister     Mental illness Brother     Heart attack Brother     Stroke Brother     Mental illness Daughter     Diabetes Daughter     Liver disease Son         Social History     Socioeconomic History    Marital status:    Tobacco Use    Smoking status: Former     Packs/day: 1.50     Years: 15.00     Pack years: 22.50     Types: Cigarettes     Quit date: 10/1/2017     Years since quittin.8    Smokeless tobacco: Never    Tobacco comments:     QUIT 3 YEARS AGO   Vaping  Use    Vaping Use: Never used   Substance and Sexual Activity    Alcohol use: Not Currently    Drug use: Never    Sexual activity: Not Currently     Partners: Male         Objective  There were no vitals filed for this visit.  There is no height or weight on file to calculate BMI.    Assessment  Diagnoses and all orders for this visit:    1. COLLETTE (generalized anxiety disorder) (Primary)    2. Primary hypertension    Other orders  -     dilTIAZem CD (CARDIZEM CD) 240 MG 24 hr capsule; Take 1 capsule by mouth Daily.  Dispense: 90 capsule; Refill: 1        Plan    Diagnoses and all orders for this visit:    1. COLLETTE (generalized anxiety disorder) (Primary)- on lexapro 5 mg qd only for few days now.  Take for 2 weeks and follow-up.  For any side effects call back immediately.    2. Primary hypertension- well-controlled on diltiazem.  Refilled med.  Continue to monitor.      Return in about 2 weeks (around 8/30/2023) for Recheck.    Willi Gutierrez PA-C  08/16/2023

## 2023-08-28 RX ORDER — ATENOLOL 25 MG/1
25 TABLET ORAL DAILY
Qty: 90 TABLET | Refills: 0 | Status: SHIPPED | OUTPATIENT
Start: 2023-08-28

## 2023-09-07 ENCOUNTER — TELEMEDICINE (OUTPATIENT)
Dept: INTERNAL MEDICINE | Facility: CLINIC | Age: 68
End: 2023-09-07
Payer: MEDICARE

## 2023-09-07 DIAGNOSIS — Z12.11 SCREEN FOR COLON CANCER: ICD-10-CM

## 2023-09-07 DIAGNOSIS — F41.1 GAD (GENERALIZED ANXIETY DISORDER): Primary | ICD-10-CM

## 2023-09-07 RX ORDER — ESCITALOPRAM OXALATE 10 MG/1
10 TABLET ORAL DAILY
Qty: 90 TABLET | Refills: 1 | Status: SHIPPED | OUTPATIENT
Start: 2023-09-07

## 2023-09-07 NOTE — PROGRESS NOTES
[facility] PRIMARY CARE  [address]  [facility] [address]  Dept: [phone]  Dept Fax: [phone]  Loc: [phone]  Loc Fax: [phone]    Marlene Agurire  1955    Televisit Note    You have chosen to receive care through a telephone visit. Do you consent to use a telephone visit for your medical care today? Yes. Pt ta home and provider in office during visit today.    History of Present Illness:  Marlene Aguirre is a 68 y.o. female who presents via video-conference for COLLETTE. Pt currently taking Lexapro 5 mg daily.  Has seen a little bit of improvement but still having breakthrough symptoms.  Denies any SI or HI.    The following portions of the patient's history were reviewed and updated as appropriate: allergies, current medications, past family history, past medical history, past social history, past surgical history, and problem list.    This visit was scheduled as a phone visit to comply with patient safety concerns in accordance with CDC recommendations.  Time spent in discussion with the patient was 15 minutes.     Medications    Current Outpatient Medications:     escitalopram (Lexapro) 10 MG tablet, Take 1 tablet by mouth Daily., Disp: 90 tablet, Rfl: 1    albuterol sulfate  (90 Base) MCG/ACT inhaler, Inhale 2 puffs Every 4 (Four) Hours As Needed for Wheezing., Disp: , Rfl:     aspirin 81 MG EC tablet, Take 1 tablet by mouth Daily., Disp: 90 tablet, Rfl: 1    atenolol (TENORMIN) 25 MG tablet, Take 1 tablet by mouth Daily., Disp: 90 tablet, Rfl: 0    Cholecalciferol 25 MCG (1000 UT) capsule, VITAMIN D-3 25 MCG (1000 UT) CAPS, Disp: , Rfl:     dilTIAZem CD (CARDIZEM CD) 240 MG 24 hr capsule, Take 1 capsule by mouth Daily., Disp: 90 capsule, Rfl: 1    loratadine (CLARITIN) 10 MG tablet, Take 1 tablet by mouth Daily., Disp: 90 tablet, Rfl: 1    rosuvastatin (CRESTOR) 20 MG tablet, , Disp: , Rfl:     spironolactone (ALDACTONE) 25 MG tablet,  Take 1 tablet by mouth Daily As Needed. Pt takes 1 tablet (25mg) daily. Pt will take an additional (25mg) tablet as needed for edema., Disp: , Rfl:     Subjective  Allergies   Allergen Reactions    Contrast Dye (Echo Or Unknown Ct/Mr) Swelling     FACE, NOSE AND NECK SWELLING AS WELL AS SANDI     Ciprofloxacin Itching    Doxycycline Unknown - Low Severity     PT UNSURE OF REACTION     Flexeril [Cyclobenzaprine] Hives    Keflex [Cephalexin] Unknown - Low Severity     PT DOES NOT REMEMBER     Kenalog [Triamcinolone Acetonide] Itching    Penicillins Hives    Sulfa Antibiotics Itching    Tetracyclines & Related Unknown - Low Severity     PT UNSURE OF REACTION    Levaquin [Levofloxacin] Rash        Past Medical History:   Diagnosis Date    Acid reflux     Allergic     Arthritis     Asthma     Atrial fibrillation     Coronary artery disease     COVID-19     High cholesterol     History of medical problems     DNC . Leap procedures  and     Hypertension     Low back pain     Migraine headache     Obesity     Pacemaker     Visual impairment        Past Surgical History:   Procedure Laterality Date    BILATERAL BREAST REDUCTION      BRAIN SURGERY      BREAST SURGERY      DILATATION AND CURETTAGE      PACEMAKER IMPLANTATION         Family History   Problem Relation Age of Onset    Heart disease Mother     Hyperlipidemia Mother     Hypertension Mother     Vision loss Mother     Osteoporosis Mother     Hypertension Father     Asthma Sister     Depression Sister     Mental illness Brother     Heart attack Brother     Stroke Brother     Mental illness Daughter     Diabetes Daughter     Liver disease Son         Social History     Socioeconomic History    Marital status:    Tobacco Use    Smoking status: Former     Packs/day: 1.50     Years: 15.00     Pack years: 22.50     Types: Cigarettes     Quit date: 10/1/2017     Years since quittin.9    Smokeless tobacco: Never    Tobacco comments:     QUIT 3 YEARS  AGO   Vaping Use    Vaping Use: Never used   Substance and Sexual Activity    Alcohol use: Not Currently    Drug use: Never    Sexual activity: Not Currently     Partners: Male         Objective  There were no vitals filed for this visit.  There is no height or weight on file to calculate BMI.    Assessment  Diagnoses and all orders for this visit:    1. COLLETTE (generalized anxiety disorder) (Primary)    2. Screen for colon cancer  -     Cologuard - Stool, Per Rectum; Future    Other orders  -     escitalopram (Lexapro) 10 MG tablet; Take 1 tablet by mouth Daily.  Dispense: 90 tablet; Refill: 1        Plan    1. COLLETTE (generalized anxiety disorder) (Primary)- better on Lexapro 5 mg daily but still uncontrolled.  Okay to increase to 10 mg when patient is comfortable.  Advised for any side effects to call back immediately.    2. Screen for colon cancer- ordered Cologuard.      Return in about 1 month (around 10/7/2023) for Recheck, Medicare Wellness.    Willi Gutierrez PA-C  09/07/2023

## 2023-10-09 RX ORDER — BUSPIRONE HYDROCHLORIDE 5 MG/1
5 TABLET ORAL 3 TIMES DAILY PRN
Qty: 90 TABLET | Refills: 2 | OUTPATIENT
Start: 2023-10-09

## 2023-10-30 ENCOUNTER — TELEPHONE (OUTPATIENT)
Dept: INTERNAL MEDICINE | Facility: CLINIC | Age: 68
End: 2023-10-30
Payer: MEDICARE

## 2023-10-30 NOTE — TELEPHONE ENCOUNTER
Spoke with patient about getting her Mammogram patient states she does not want to go out to [facility] or [facility]. Patient will call central scheduling. Phone number provided.

## 2023-11-24 RX ORDER — ATENOLOL 25 MG/1
25 TABLET ORAL DAILY
Qty: 90 TABLET | Refills: 0 | Status: SHIPPED | OUTPATIENT
Start: 2023-11-24

## 2023-12-07 RX ORDER — SPIRONOLACTONE 25 MG/1
25 TABLET ORAL DAILY PRN
OUTPATIENT
Start: 2023-12-07

## 2023-12-14 ENCOUNTER — TELEMEDICINE (OUTPATIENT)
Dept: INTERNAL MEDICINE | Facility: CLINIC | Age: 68
End: 2023-12-14
Payer: MEDICARE

## 2023-12-14 DIAGNOSIS — I10 PRIMARY HYPERTENSION: Primary | ICD-10-CM

## 2023-12-14 DIAGNOSIS — Z00.00 HEALTH CARE MAINTENANCE: ICD-10-CM

## 2023-12-14 RX ORDER — SPIRONOLACTONE 25 MG/1
25 TABLET ORAL DAILY
Qty: 90 TABLET | Refills: 1 | Status: SHIPPED | OUTPATIENT
Start: 2023-12-14

## 2023-12-14 RX ORDER — LORATADINE 10 MG/1
10 TABLET ORAL DAILY
Qty: 90 TABLET | Refills: 1 | Status: SHIPPED | OUTPATIENT
Start: 2023-12-14

## 2023-12-14 NOTE — PROGRESS NOTES
[facility] PRIMARY [facility]ARE  [address]  [place] [facility] [address]  Dept: [phone]  Dept Fax: [phone]  Loc: [phone]  Loc Fax: [phone]    Marlene Aguirre  1955    Televisit Note    You have chosen to receive care through a telephone visit. Do you consent to use a telephone visit for your medical care today? Yes.  Patient at home and provider in office during visit today.    History of Present Illness:  Marlene Aguirre is a 68 y.o. female who presents via video-conference for high blood pressure.  Patient on spironolactone 25 mg daily, Cardizem 240 mg extended release daily, and atenolol 25 mg daily.  Taking as directed with any problems or side effects.  Blood pressure running normal at home around 120/80 on average.  Requesting spironolactone refill today.  Overall doing well and feeling well.    The following portions of the patient's history were reviewed and updated as appropriate: allergies, current medications, past family history, past medical history, past social history, past surgical history, and problem list.    This visit was scheduled as a phone visit to comply with patient safety concerns in accordance with CDC recommendations.  Time spent in discussion with the patient was 15 minutes.     Medications    Current Outpatient Medications:     loratadine (CLARITIN) 10 MG tablet, Take 1 tablet by mouth Daily., Disp: 90 tablet, Rfl: 1    spironolactone (ALDACTONE) 25 MG tablet, Take 1 tablet by mouth Daily. Pt takes 1 tablet (25mg) daily. Pt will take an additional (25mg) tablet as needed for edema., Disp: 90 tablet, Rfl: 1    albuterol sulfate  (90 Base) MCG/ACT inhaler, Inhale 2 puffs Every 4 (Four) Hours As Needed for Wheezing., Disp: , Rfl:     aspirin 81 MG EC tablet, Take 1 tablet by mouth Daily., Disp: 90 tablet, Rfl: 1    atenolol (TENORMIN) 25 MG tablet, Take 1 tablet by mouth Daily., Disp: 90 tablet, Rfl: 0     Cholecalciferol 25 MCG (1000 UT) capsule, VITAMIN D-3 25 MCG (1000 UT) CAPS, Disp: , Rfl:     dilTIAZem CD (CARDIZEM CD) 240 MG 24 hr capsule, Take 1 capsule by mouth Daily., Disp: 90 capsule, Rfl: 1    escitalopram (Lexapro) 10 MG tablet, Take 1 tablet by mouth Daily., Disp: 90 tablet, Rfl: 1    rosuvastatin (CRESTOR) 20 MG tablet, , Disp: , Rfl:     Subjective  Allergies   Allergen Reactions    Contrast Dye (Echo Or Unknown Ct/Mr) Swelling     FACE, NOSE AND NECK SWELLING AS WELL AS SANDI     Ciprofloxacin Itching    Doxycycline Unknown - Low Severity     PT UNSURE OF REACTION     Flexeril [Cyclobenzaprine] Hives    Keflex [Cephalexin] Unknown - Low Severity     PT DOES NOT REMEMBER     Kenalog [Triamcinolone Acetonide] Itching    Penicillins Hives    Sulfa Antibiotics Itching    Tetracyclines & Related Unknown - Low Severity     PT UNSURE OF REACTION    Levaquin [Levofloxacin] Rash        Past Medical History:   Diagnosis Date    Acid reflux     Allergic     Arthritis     Asthma     Atrial fibrillation     Coronary artery disease     COVID-19     High cholesterol     History of medical problems     DNC 1995. Leap procedures 2001 and 2006    Hypertension     Low back pain     Migraine headache     Obesity     Pacemaker     Visual impairment        Past Surgical History:   Procedure Laterality Date    BILATERAL BREAST REDUCTION      BRAIN SURGERY      BREAST SURGERY  1991    DILATATION AND CURETTAGE      PACEMAKER IMPLANTATION         Family History   Problem Relation Age of Onset    Heart disease Mother     Hyperlipidemia Mother     Hypertension Mother     Vision loss Mother     Osteoporosis Mother     Hypertension Father     Asthma Sister     Depression Sister     Mental illness Brother     Heart attack Brother     Stroke Brother     Mental illness Daughter     Diabetes Daughter     Liver disease Son         Social History     Socioeconomic History    Marital status:    Tobacco Use    Smoking status: Former      Packs/day: 1.50     Years: 15.00     Additional pack years: 0.00     Total pack years: 22.50     Types: Cigarettes     Quit date: 10/1/2017     Years since quittin.2    Smokeless tobacco: Never    Tobacco comments:     QUIT 3 YEARS AGO   Vaping Use    Vaping Use: Never used   Substance and Sexual Activity    Alcohol use: Not Currently    Drug use: Never    Sexual activity: Not Currently     Partners: Male         Objective  There were no vitals filed for this visit.  There is no height or weight on file to calculate BMI.    Assessment  Diagnoses and all orders for this visit:    1. Primary hypertension (Primary)    2. Health care maintenance  -     CBC (No Diff)  -     Comprehensive Metabolic Panel  -     TSH Rfx On Abnormal To Free T4  -     Hemoglobin A1c; Future  -     Lipid Panel    Other orders  -     spironolactone (ALDACTONE) 25 MG tablet; Take 1 tablet by mouth Daily. Pt takes 1 tablet (25mg) daily. Pt will take an additional (25mg) tablet as needed for edema.  Dispense: 90 tablet; Refill: 1  -     loratadine (CLARITIN) 10 MG tablet; Take 1 tablet by mouth Daily.  Dispense: 90 tablet; Refill: 1        Plan      1. Primary hypertension (Primary)- well-controlled on spironolactone, atenolol, and Cardizem.  Refilled spironolactone 25 mg daily.  Continue to monitor.  Keep same.    2. Health care maintenance- ordered fasting labs.      Return in about 3 months (around 3/14/2024) for Recheck.    Willi Gutierrez PA-C  2023

## 2024-02-23 RX ORDER — ATENOLOL 25 MG/1
25 TABLET ORAL DAILY
Qty: 90 TABLET | Refills: 0 | Status: SHIPPED | OUTPATIENT
Start: 2024-02-23

## 2024-02-26 RX ORDER — DILTIAZEM HYDROCHLORIDE 240 MG/1
240 CAPSULE, COATED, EXTENDED RELEASE ORAL DAILY
Qty: 90 CAPSULE | Refills: 1 | Status: SHIPPED | OUTPATIENT
Start: 2024-02-26

## 2024-03-05 RX ORDER — ESCITALOPRAM OXALATE 10 MG/1
10 TABLET ORAL DAILY
Qty: 90 TABLET | Refills: 1 | Status: SHIPPED | OUTPATIENT
Start: 2024-03-05

## 2024-03-25 RX ORDER — SPIRONOLACTONE 25 MG/1
TABLET ORAL
Qty: 90 TABLET | Refills: 1 | Status: SHIPPED | OUTPATIENT
Start: 2024-03-25

## 2024-05-24 RX ORDER — ATENOLOL 25 MG/1
25 TABLET ORAL DAILY
Qty: 90 TABLET | Refills: 0 | Status: SHIPPED | OUTPATIENT
Start: 2024-05-24

## 2024-06-27 RX ORDER — SPIRONOLACTONE 25 MG/1
TABLET ORAL
Qty: 90 TABLET | Refills: 1 | Status: SHIPPED | OUTPATIENT
Start: 2024-06-27

## 2024-09-03 RX ORDER — ATENOLOL 25 MG/1
25 TABLET ORAL DAILY
Qty: 90 TABLET | Refills: 0 | Status: SHIPPED | OUTPATIENT
Start: 2024-09-03

## 2024-09-03 RX ORDER — DILTIAZEM HYDROCHLORIDE 240 MG/1
240 CAPSULE, COATED, EXTENDED RELEASE ORAL DAILY
Qty: 90 CAPSULE | Refills: 0 | Status: SHIPPED | OUTPATIENT
Start: 2024-09-03

## 2024-10-04 ENCOUNTER — TELEPHONE (OUTPATIENT)
Dept: INTERNAL MEDICINE | Facility: CLINIC | Age: 69
End: 2024-10-04
Payer: MEDICARE

## 2024-10-04 NOTE — TELEPHONE ENCOUNTER
HUB CAN RELAY MESSAGE TO PATIENT      ATTEMPTED TO CONTACT THE PT TO INFORM HER SHE IS DUE FOR A MEDICARE WELLNESS EXAM AND WE CAN WORK HER IN TODAY OR SOMETIME NEXT WEEK IF SHE WOULD LIKE TO COMPLETE IT.

## 2024-12-11 RX ORDER — DILTIAZEM HYDROCHLORIDE 240 MG/1
240 CAPSULE, COATED, EXTENDED RELEASE ORAL DAILY
Qty: 90 CAPSULE | Refills: 0 | OUTPATIENT
Start: 2024-12-11

## 2024-12-11 RX ORDER — ATENOLOL 25 MG/1
25 TABLET ORAL DAILY
Qty: 90 TABLET | Refills: 0 | OUTPATIENT
Start: 2024-12-11

## 2025-02-14 ENCOUNTER — TELEPHONE (OUTPATIENT)
Dept: INTERNAL MEDICINE | Facility: CLINIC | Age: 70
End: 2025-02-14

## 2025-02-14 RX ORDER — ATENOLOL 25 MG/1
25 TABLET ORAL DAILY
Qty: 90 TABLET | Refills: 0 | Status: SHIPPED | OUTPATIENT
Start: 2025-02-14

## 2025-02-14 RX ORDER — DILTIAZEM HYDROCHLORIDE 240 MG/1
240 CAPSULE, COATED, EXTENDED RELEASE ORAL DAILY
Qty: 90 CAPSULE | Refills: 0 | Status: SHIPPED | OUTPATIENT
Start: 2025-02-14

## 2025-02-14 RX ORDER — DILTIAZEM HYDROCHLORIDE 240 MG/1
240 CAPSULE, COATED, EXTENDED RELEASE ORAL DAILY
Qty: 90 CAPSULE | Refills: 0 | OUTPATIENT
Start: 2025-02-14

## 2025-02-14 RX ORDER — ATENOLOL 25 MG/1
25 TABLET ORAL DAILY
Qty: 90 TABLET | Refills: 0 | OUTPATIENT
Start: 2025-02-14

## 2025-02-14 NOTE — TELEPHONE ENCOUNTER
Caller: Gurmeet Aguirre    Relationship: Self    Best call back number: [phone]     What is the best time to reach you: TODAY    Who are you requesting to speak with (clinical staff, provider,  specific staff member): CHACORTA GARCIA    Do you know the name of the person who called: GURMEET    What was the call regarding: PATIENT ONLY STATED SHE WANTED A CALLBACK WHEN ASKED IF SHE WANTED ME TO PUT A REASON    Is it okay if the provider responds through MyChart: CALLBACK

## 2025-05-27 RX ORDER — ATENOLOL 25 MG/1
25 TABLET ORAL DAILY
Qty: 90 TABLET | Refills: 0 | OUTPATIENT
Start: 2025-05-27

## 2025-05-27 RX ORDER — DILTIAZEM HYDROCHLORIDE 240 MG/1
240 CAPSULE, COATED, EXTENDED RELEASE ORAL DAILY
Qty: 90 CAPSULE | Refills: 0 | OUTPATIENT
Start: 2025-05-27

## 2025-06-09 ENCOUNTER — READMISSION MANAGEMENT (OUTPATIENT)
Dept: CALL CENTER | Facility: HOSPITAL | Age: 70
End: 2025-06-09
Payer: MEDICARE

## 2025-06-09 NOTE — OUTREACH NOTE
Prep Survey      Flowsheet Row Responses   [facility]? [facility]   Is LACE score < 7 ? Non-BH Discharge   Eligibility [facility]   Date of Admission 06/06/25   Date of Discharge 06/09/25   Discharge diagnosis Near syncope   Does the patient have one of the following disease processes/diagnoses(primary or secondary)? Other   Prep survey completed? Yes            Albina SANTILLAN - Registered Nurse

## 2025-06-10 ENCOUNTER — TRANSITIONAL CARE MANAGEMENT TELEPHONE ENCOUNTER (OUTPATIENT)
Dept: CALL CENTER | Facility: HOSPITAL | Age: 70
End: 2025-06-10
Payer: MEDICARE

## 2025-06-10 NOTE — OUTREACH NOTE
Call Center TCM Note      Flowsheet Row Responses   [facility] patient discharged from? Non-  [[facility]]   Does the patient have one of the following disease processes/diagnoses(primary or secondary)? Other   TCM attempt successful? No   Unsuccessful attempts Attempt 1   Revoked Reason Other  [Pt will be seeing a different PCP and is following up with [facility].]            RED SMITH - Registered Nurse    6/10/2025, 15:50 EDT